# Patient Record
Sex: FEMALE | Race: BLACK OR AFRICAN AMERICAN | NOT HISPANIC OR LATINO | Employment: FULL TIME | URBAN - METROPOLITAN AREA
[De-identification: names, ages, dates, MRNs, and addresses within clinical notes are randomized per-mention and may not be internally consistent; named-entity substitution may affect disease eponyms.]

---

## 2017-04-15 ENCOUNTER — HOSPITAL ENCOUNTER (EMERGENCY)
Facility: HOSPITAL | Age: 57
Discharge: HOME | End: 2017-04-15
Attending: EMERGENCY MEDICINE
Payer: MEDICAID

## 2017-04-15 VITALS
SYSTOLIC BLOOD PRESSURE: 156 MMHG | HEART RATE: 87 BPM | DIASTOLIC BLOOD PRESSURE: 78 MMHG | OXYGEN SATURATION: 99 % | HEIGHT: 64 IN | RESPIRATION RATE: 18 BRPM | TEMPERATURE: 98 F | WEIGHT: 140 LBS | BODY MASS INDEX: 23.9 KG/M2

## 2017-04-15 DIAGNOSIS — G44.209 ACUTE NON INTRACTABLE TENSION-TYPE HEADACHE: Primary | ICD-10-CM

## 2017-04-15 PROCEDURE — 99283 EMERGENCY DEPT VISIT LOW MDM: CPT | Mod: 25

## 2017-04-15 PROCEDURE — 63600175 PHARM REV CODE 636 W HCPCS: Performed by: PHYSICIAN ASSISTANT

## 2017-04-15 PROCEDURE — 25000003 PHARM REV CODE 250: Performed by: PHYSICIAN ASSISTANT

## 2017-04-15 PROCEDURE — 96372 THER/PROPH/DIAG INJ SC/IM: CPT

## 2017-04-15 RX ORDER — LISINOPRIL AND HYDROCHLOROTHIAZIDE 10; 12.5 MG/1; MG/1
1 TABLET ORAL DAILY
COMMUNITY

## 2017-04-15 RX ORDER — METHOCARBAMOL 500 MG/1
500 TABLET, FILM COATED ORAL 2 TIMES DAILY PRN
COMMUNITY
End: 2017-04-15 | Stop reason: ALTCHOICE

## 2017-04-15 RX ORDER — PROPRANOLOL HYDROCHLORIDE 10 MG/1
10 TABLET ORAL 2 TIMES DAILY
COMMUNITY

## 2017-04-15 RX ORDER — SUMATRIPTAN SUCCINATE 25 MG/1
50 TABLET ORAL 3 TIMES DAILY PRN
COMMUNITY

## 2017-04-15 RX ORDER — KETOROLAC TROMETHAMINE 10 MG/1
10 TABLET, FILM COATED ORAL
Status: COMPLETED | OUTPATIENT
Start: 2017-04-15 | End: 2017-04-15

## 2017-04-15 RX ORDER — LEVOTHYROXINE SODIUM 88 UG/1
88 TABLET ORAL DAILY
COMMUNITY

## 2017-04-15 RX ORDER — DIAZEPAM 10 MG/2ML
5 INJECTION INTRAMUSCULAR
Status: COMPLETED | OUTPATIENT
Start: 2017-04-15 | End: 2017-04-15

## 2017-04-15 RX ORDER — CYCLOBENZAPRINE HCL 5 MG
5 TABLET ORAL 3 TIMES DAILY PRN
Qty: 15 TABLET | Refills: 0 | Status: SHIPPED | OUTPATIENT
Start: 2017-04-15 | End: 2017-04-25

## 2017-04-15 RX ORDER — BUTALBITAL, ACETAMINOPHEN AND CAFFEINE 50; 325; 40 MG/1; MG/1; MG/1
1 TABLET ORAL 2 TIMES DAILY PRN
COMMUNITY

## 2017-04-15 RX ADMIN — KETOROLAC TROMETHAMINE 10 MG: 10 TABLET, FILM COATED ORAL at 02:04

## 2017-04-15 RX ADMIN — DIAZEPAM 5 MG: 5 INJECTION, SOLUTION INTRAMUSCULAR; INTRAVENOUS at 02:04

## 2017-04-15 NOTE — DISCHARGE INSTRUCTIONS
Tension Headache    A muscle tension headache is a very common cause of head pain. Its also called a stress headache. When some people are under stress, they tense the muscles of their shoulder, neck, and scalp without knowing it. If this tension lasts long enough, a headache can occur. A tension headache can be quite painful. It can last for hours or even days.  Home care  Follow these tips when caring for yourself at home:  · Dont drive yourself home if you were given pain medicine for your headache. Instead, have someone else drive you home. Try to sleep when you get home. You should feel much better when you wake up.  · Put heat on the back of your neck to help ease neck spasm.  · Drink only clear liquids or eat a light diet until your symptoms get better. This will help you avoid nausea or vomiting.  How to prevent headaches  · Figure out what is causing stress in your life. Learn new ways to handle your stress. Ideas include regular exercise, biofeedback, self-hypnosis, yoga, and meditation. Talk with your healthcare provider to find out more information about managing stress. Many books and digital media are also available on this subject.  · Take time out at the first sign of a tension headache, if possible. Take yourself out of the stressful situation. Find a quiet, comfortable place to sit or lie down and let yourself relax. Heat and deep massage of the tight areas in the neck and shoulders may help ease muscle spasm. You may also get relief from a medicine like ibuprofen or a prescribed muscle relaxant.  Follow-up care  Follow up with your healthcare provider, or as advised. Talk with your provider if you have frequent headaches. He or she can figure out a treatment plan. Ask if you can have medicine to take at home the next time you get a bad headache. This may keep you from having to visit the emergency department in the future. You may need to see a headache specialist (neurologist) if you continue  to have headaches.  When to seek medical advice  Call your healthcare provider right away if any of these occur:  · Your head pain gets worse during sexual intercourse or strenuous activity  · Your head pain doesnt get better within 24 hours  · You arent able to keep liquids down (repeated vomiting)  · Fever of 100.4ºF (38ºC) or higher, or as directed by your healthcare provider  · Stiff neck  · Extreme drowsiness, confusion, or fainting  · Dizziness or dizziness with spinning sensation (vertigo)  · Weakness in an arm or leg or one side of your face  · You have difficulty speaking  · Your vision changes  Date Last Reviewed: 8/1/2016  © 1383-6875 [employer]. [address] [place] [address], [place] [address]. All rights reserved. This information is not intended as a substitute for professional medical care. Always follow your healthcare professional's instructions.

## 2017-04-15 NOTE — ED TRIAGE NOTES
Pt reports posterior headache for the past week, states saw PCP and placed on Robaxin and Fioricet with no relief.

## 2017-04-15 NOTE — ED PROVIDER NOTES
Encounter Date: 4/15/2017       History     Chief Complaint   Patient presents with    Headache     posterior headache that radiates to neck x1 week. Saw pcp for complaint, but denies improvement with prescribed medications. Denies nausea or vision changes     Review of patient's allergies indicates:  No Known Allergies  HPI Comments: Nora Torres 56 y.o. female with PMH of HTN and radiation induced hypothyroidism presented to the ED with C/O headache for the past one week. She describes gradual onset of posterior pain that radiates to bilateral neck. She describes pain exacerbated by certain movements.  She denies any fever, chills, vision changes, sudden onset, weakness, numbness, tingling, nausea, vomiting, or recent sickness. She saw PCP for this pain and was placed on Fioricet, robaxin and Imitrex with no relief. She did not try any medications today for the pain. ROS positive for headache.  Physical exam reveals patient well appearing in no obvious distress wit    The history is provided by the patient.     Past Medical History:   Diagnosis Date    Hypertension     Thyroid disease     hypothyroid     Past Surgical History:   Procedure Laterality Date     SECTION      x2     History reviewed. No pertinent family history.  Social History   Substance Use Topics    Smoking status: Current Every Day Smoker     Types: Cigarettes    Smokeless tobacco: None    Alcohol use No     Review of Systems   Constitutional: Negative for activity change, appetite change, chills and fever.   HENT: Negative for congestion and sore throat.    Eyes: Negative for visual disturbance.   Respiratory: Negative for chest tightness and shortness of breath.    Cardiovascular: Negative for chest pain.   Gastrointestinal: Negative for abdominal pain, nausea and vomiting.   Musculoskeletal: Positive for neck pain. Negative for back pain, gait problem and joint swelling.        Bilateral neck pain   Skin: Negative for rash.    Neurological: Positive for headaches. Negative for dizziness, weakness and light-headedness.   Hematological: Does not bruise/bleed easily.   Psychiatric/Behavioral: Negative for confusion.       Physical Exam   Initial Vitals   BP Pulse Resp Temp SpO2   04/15/17 1318 04/15/17 1318 04/15/17 1318 04/15/17 1318 04/15/17 1318   156/78 87 18 98.3 °F (36.8 °C) 99 %     Physical Exam    Nursing note and vitals reviewed.  Constitutional: Vital signs are normal. She appears well-developed and well-nourished. She is cooperative.  Non-toxic appearance. She does not appear ill. No distress.   HENT:   Head: Normocephalic and atraumatic.   Eyes: Conjunctivae and lids are normal.   Neck: Neck supple. Muscular tenderness present. No spinous process tenderness present. Normal range of motion present.       Cardiovascular: Normal rate and regular rhythm.   Pulmonary/Chest: Breath sounds normal. No respiratory distress. She has no wheezes. She has no rhonchi.   Abdominal: Soft. Normal appearance and bowel sounds are normal. There is no tenderness. There is no rigidity and no guarding.   Neurological: She is alert and oriented to person, place, and time. She has normal strength. No cranial nerve deficit or sensory deficit. Gait normal. GCS eye subscore is 4. GCS verbal subscore is 5. GCS motor subscore is 6.   Skin: Skin is warm, dry and intact. No rash noted.   Psychiatric: She has a normal mood and affect. Her speech is normal and behavior is normal. Thought content normal.         ED Course   Procedures  Labs Reviewed - No data to display     Nora Torres 56 y.o. female with PMH of HTN and radiation induced hypothyroidism presented to the ED with C/O headache for the past one week. She describes gradual onset of posterior pain that radiates to bilateral neck. She describes pain exacerbated by certain movements.  She denies any fever, chills, vision changes, sudden onset, weakness, numbness, tingling, nausea, vomiting, or  recent sickness. She saw PCP for this pain and was placed on Fioricet, robaxin and Imitrex with no relief. She did not try any medications today for the pain. ROS positive for headache.  Physical exam reveals patient well appearing in no obvious distress with smooth steady gait to room. Patient appears to be guarding neck due to some pain however exhibits FROM; TTP of the bilateral capitus and trapezius muscles with spasm noted. No midline tenderness or deformity. PERRL, EOMs intact with no sinus pressure or TM abnormality. Cranial nerves 2-12 with no deficit at this time.    DDX: tension headache, muscle strain, acute intracranial abnormality    ED management:Patient has no neck meningismus, fever, confusion, vision loss, temporal artery tenderness, rash, vomiting, photophobia or thunderclap onset to suggest pseudotumor cerebri, meningitis, tumor, ICH, temporal arteritis, or encephalitis.      Impression/Plan: Patient informed of diagnosis There were no encounter diagnoses.  Discharged with flexeril. Patient will follow up with Primary.  Patient cautioned on when to return to ED.  Pt. Understands and agrees with current treatment plan                         ED Course     Clinical Impression:   The encounter diagnosis was Acute non intractable tension-type headache.          DIANNA Lopez  04/15/17 2902

## 2017-04-15 NOTE — ED AVS SNAPSHOT
[facility] [address]  [place] [address]               Nora Torres   4/15/2017  1:44 PM   ED    Description:  Female : 1960   Department:  [facility]           Your Care was Coordinated By:     Provider Role From To    Dallas Savage Jr., MD Attending Provider 04/15/17 5493 --    DIANNA Lopez Physician Assistant 04/15/17 4190 --      Reason for Visit     Headache           Diagnoses this Visit        Comments    Acute non intractable tension-type headache    -  Primary       ED Disposition     None           To Do List           Follow-up Information     Follow up with Bobbi Booker MD. Go in 1 week.    Specialty:  Family Medicine    Contact information:    [facility] [facility]  [place] [id number] [phone]         These Medications        Disp Refills Start End    cyclobenzaprine (FLEXERIL) 5 MG tablet 15 tablet 0 4/15/2017 2017    Take 1 tablet (5 mg total) by mouth 3 (three) times daily as needed for Muscle spasms. - Oral    Pharmacy: [id number] - [place] - [address] AT [facility] Ph #: [phone]         [facility]     [facility] Nurse Care Line - 24/ Assistance  Unless otherwise directed by your provider, please contact [facility], our nurse care line that is available for 24/ assistance.     Registered nurses in the [facility] provide: appointment scheduling, clinical advisement, health education, and other advisory services.  Call: [phone] (toll free)               Medications           Message regarding Medications     Verify the changes and/or additions to your medication regime listed below are the same as discussed with your clinician today.  If any of these changes or additions are incorrect, please notify your healthcare provider.        START taking these NEW medications        Refills     "cyclobenzaprine (FLEXERIL) 5 MG tablet 0    Sig: Take 1 tablet (5 mg total) by mouth 3 (three) times daily as needed for Muscle spasms.    Class: Print    Route: Oral      These medications were administered today        Dose Freq    diazePAM injection 5 mg 5 mg ED 1 Time    Sig: Inject 1 mL (5 mg total) into the muscle ED 1 Time.    Class: Normal    Route: Intramuscular    Cosign for Ordering: Required by Dallas Savage Jr., MD    ketorolac tablet 10 mg 10 mg ED 1 Time    Sig: Take 1 tablet (10 mg total) by mouth ED 1 Time.    Class: Normal    Route: Oral    Cosign for Ordering: Required by Dallas Savage Jr., MD      STOP taking these medications     methocarbamol (ROBAXIN) 500 MG Tab Take 500 mg by mouth 2 (two) times daily as needed.           Verify that the below list of medications is an accurate representation of the medications you are currently taking.  If none reported, the list may be blank. If incorrect, please contact your healthcare provider. Carry this list with you in case of emergency.           Current Medications     butalbital-acetaminophen-caffeine -40 mg (FIORICET, ESGIC) -40 mg per tablet Take 1 tablet by mouth 2 (two) times daily as needed for Pain.    levothyroxine (SYNTHROID) 88 MCG tablet Take 88 mcg by mouth once daily.    lisinopril-hydrochlorothiazide (PRINZIDE,ZESTORETIC) 10-12.5 mg per tablet Take 1 tablet by mouth once daily.    propranolol (INDERAL) 10 MG tablet Take 10 mg by mouth 2 (two) times daily.    sumatriptan (IMITREX) 25 MG Tab Take 50 mg by mouth 3 (three) times daily as needed.    cyclobenzaprine (FLEXERIL) 5 MG tablet Take 1 tablet (5 mg total) by mouth 3 (three) times daily as needed for Muscle spasms.           Clinical Reference Information           Your Vitals Were     BP Pulse Temp Resp Height Weight    156/78 87 98.3 °F (36.8 °C) (Oral) 18 5' 4" (1.626 m) 63.5 kg (140 lb)    SpO2 BMI             99% 24.03 kg/m2         Allergies as of 4/15/2017     " No Known Allergies      Immunizations Administered on Date of Encounter - 4/15/2017     None      ED Micro, Lab, POCT     None      ED Imaging Orders     None        Discharge Instructions         Tension Headache    A muscle tension headache is a very common cause of head pain. Its also called a stress headache. When some people are under stress, they tense the muscles of their shoulder, neck, and scalp without knowing it. If this tension lasts long enough, a headache can occur. A tension headache can be quite painful. It can last for hours or even days.  Home care  Follow these tips when caring for yourself at home:  · Dont drive yourself home if you were given pain medicine for your headache. Instead, have someone else drive you home. Try to sleep when you get home. You should feel much better when you wake up.  · Put heat on the back of your neck to help ease neck spasm.  · Drink only clear liquids or eat a light diet until your symptoms get better. This will help you avoid nausea or vomiting.  How to prevent headaches  · Figure out what is causing stress in your life. Learn new ways to handle your stress. Ideas include regular exercise, biofeedback, self-hypnosis, yoga, and meditation. Talk with your healthcare provider to find out more information about managing stress. Many books and digital media are also available on this subject.  · Take time out at the first sign of a tension headache, if possible. Take yourself out of the stressful situation. Find a quiet, comfortable place to sit or lie down and let yourself relax. Heat and deep massage of the tight areas in the neck and shoulders may help ease muscle spasm. You may also get relief from a medicine like ibuprofen or a prescribed muscle relaxant.  Follow-up care  Follow up with your healthcare provider, or as advised. Talk with your provider if you have frequent headaches. He or she can figure out a treatment plan. Ask if you can have medicine to take  at home the next time you get a bad headache. This may keep you from having to visit the emergency department in the future. You may need to see a headache specialist (neurologist) if you continue to have headaches.  When to seek medical advice  Call your healthcare provider right away if any of these occur:  · Your head pain gets worse during sexual intercourse or strenuous activity  · Your head pain doesnt get better within 24 hours  · You arent able to keep liquids down (repeated vomiting)  · Fever of 100.4ºF (38ºC) or higher, or as directed by your healthcare provider  · Stiff neck  · Extreme drowsiness, confusion, or fainting  · Dizziness or dizziness with spinning sensation (vertigo)  · Weakness in an arm or leg or one side of your face  · You have difficulty speaking  · Your vision changes  Date Last Reviewed: 8/1/2016  © 5580-2209 [employer]. [address] [place] [address]. All rights reserved. This information is not intended as a substitute for professional medical care. Always follow your healthcare professional's instructions.          [facility] Sign-Up     Activating your [facility] account is as easy as 1-2-3!     1) Visit [email or web address], select Sign Up Now, enter this activation code and your date of birth, then select Next.  [email or web address]  Expires: 5/30/2017  2:45 PM      2) Create a username and password to use when you visit [facility] in the future and select a security question in case you lose your password and select Next.    3) Enter your e-mail address and click Sign Up!    Additional Information  If you have questions, please e-mail [email or web address] or call [phone] to talk to our [facility] staff. Remember, [facility] is NOT to be used for urgent needs. For medical emergencies, dial 911.         Smoking Cessation     If you would like to quit smoking:   You may be eligible for free services if you are a [place] resident and started smoking cigarettes  before September 1, 1988.  Call the Smoking Cessation Trust (SCT) toll free at (325) 593-2653 or (930) 082-5374.   Call 1-800-QUIT-NOW if you do not meet the above criteria.   Contact us via email: [email or web address]   View our website for more information: [email or web address]         [facility] complies with applicable Federal civil rights laws and does not discriminate on the basis of race, color, national origin, age, disability, or sex.        Language Assistance Services     ATTENTION: Language assistance services are available, free of charge. Please call [phone].      ATENCIÓN: Si habla español, tiene a murrell disposición servicios gratuitos de asistencia lingüística. Llame al [phone].     CHÚ Ý: N?u b?n nói Ti?ng Vi?t, có các d?ch v? h? tr? ngôn ng? mi?n phí dành cho b?n. G?i s? [phone].

## 2017-10-10 ENCOUNTER — CLINICAL SUPPORT (OUTPATIENT)
Dept: URGENT CARE | Facility: CLINIC | Age: 57
End: 2017-10-10

## 2017-10-10 DIAGNOSIS — Z23 ENCOUNTER FOR IMMUNIZATION: Primary | ICD-10-CM

## 2017-10-10 PROCEDURE — 86580 TB INTRADERMAL TEST: CPT | Mod: S$GLB,,,

## 2024-09-16 ENCOUNTER — HOSPITAL ENCOUNTER (EMERGENCY)
Facility: HOSPITAL | Age: 64
Discharge: HOME | End: 2024-09-16
Attending: FAMILY MEDICINE
Payer: MEDICAID

## 2024-09-16 VITALS
SYSTOLIC BLOOD PRESSURE: 155 MMHG | OXYGEN SATURATION: 100 % | HEART RATE: 79 BPM | BODY MASS INDEX: 23.9 KG/M2 | HEIGHT: 64 IN | DIASTOLIC BLOOD PRESSURE: 81 MMHG | WEIGHT: 140 LBS | TEMPERATURE: 98 F | RESPIRATION RATE: 20 BRPM

## 2024-09-16 DIAGNOSIS — S39.012A LUMBAR STRAIN, INITIAL ENCOUNTER: Primary | ICD-10-CM

## 2024-09-16 DIAGNOSIS — R61 DIAPHORESIS: ICD-10-CM

## 2024-09-16 DIAGNOSIS — M47.816 SPONDYLOSIS OF LUMBAR SPINE: ICD-10-CM

## 2024-09-16 LAB
ALBUMIN SERPL BCP-MCNC: 4 G/DL (ref 3.5–5.2)
ALP SERPL-CCNC: 106 U/L (ref 38–126)
ALT SERPL W/O P-5'-P-CCNC: 16 U/L (ref 10–44)
AMPHET+METHAMPHET UR QL: NEGATIVE
ANION GAP SERPL CALC-SCNC: 7 MMOL/L (ref 8–16)
AST SERPL-CCNC: 34 U/L (ref 15–46)
BACTERIA #/AREA URNS AUTO: ABNORMAL /HPF
BARBITURATES UR QL SCN>200 NG/ML: NEGATIVE
BASOPHILS # BLD AUTO: 0.06 K/UL (ref 0–0.2)
BASOPHILS NFR BLD: 0.6 % (ref 0–1.9)
BENZODIAZ UR QL SCN>200 NG/ML: NEGATIVE
BILIRUB SERPL-MCNC: 0.4 MG/DL (ref 0.1–1)
BILIRUB UR QL STRIP: NEGATIVE
BZE UR QL SCN: NEGATIVE
CALCIUM SERPL-MCNC: 9.2 MG/DL (ref 8.7–10.5)
CANNABINOIDS UR QL SCN: NEGATIVE
CHLORIDE SERPL-SCNC: 105 MMOL/L (ref 95–110)
CLARITY UR REFRACT.AUTO: CLEAR
CO2 SERPL-SCNC: 26 MMOL/L (ref 23–29)
COLOR UR AUTO: YELLOW
CREAT SERPL-MCNC: 0.88 MG/DL (ref 0.5–1.4)
CREAT UR-MCNC: 81.2 MG/DL (ref 15–325)
DIFFERENTIAL METHOD BLD: ABNORMAL
EOSINOPHIL # BLD AUTO: 0 K/UL (ref 0–0.5)
EOSINOPHIL NFR BLD: 0.4 % (ref 0–8)
ERYTHROCYTE [DISTWIDTH] IN BLOOD BY AUTOMATED COUNT: 15.1 % (ref 11.5–14.5)
EST. GFR  (NO RACE VARIABLE): >60 ML/MIN/1.73 M^2
GLUCOSE SERPL-MCNC: 148 MG/DL (ref 70–110)
GLUCOSE UR QL STRIP: NEGATIVE
HCT VFR BLD AUTO: 39.6 % (ref 37–48.5)
HGB BLD-MCNC: 13.4 G/DL (ref 12–16)
HGB UR QL STRIP: ABNORMAL
IMM GRANULOCYTES # BLD AUTO: 0.06 K/UL (ref 0–0.04)
IMM GRANULOCYTES NFR BLD AUTO: 0.6 % (ref 0–0.5)
KETONES UR QL STRIP: NEGATIVE
LEUKOCYTE ESTERASE UR QL STRIP: NEGATIVE
LYMPHOCYTES # BLD AUTO: 1.7 K/UL (ref 1–4.8)
LYMPHOCYTES NFR BLD: 16.6 % (ref 18–48)
MCH RBC QN AUTO: 29.3 PG (ref 27–31)
MCHC RBC AUTO-ENTMCNC: 33.8 G/DL (ref 32–36)
MCV RBC AUTO: 87 FL (ref 82–98)
METHADONE UR QL SCN>300 NG/ML: NEGATIVE
MICROSCOPIC COMMENT: ABNORMAL
MONOCYTES # BLD AUTO: 0.6 K/UL (ref 0.3–1)
MONOCYTES NFR BLD: 5.5 % (ref 4–15)
NEUTROPHILS # BLD AUTO: 8 K/UL (ref 1.8–7.7)
NEUTROPHILS NFR BLD: 76.3 % (ref 38–73)
NITRITE UR QL STRIP: POSITIVE
NRBC BLD-RTO: 0 /100 WBC
NT-PROBNP SERPL-MCNC: <20 PG/ML (ref 5–900)
OPIATES UR QL SCN: ABNORMAL
PCP UR QL SCN>25 NG/ML: NEGATIVE
PH UR STRIP: 6 [PH] (ref 5–8)
PLATELET # BLD AUTO: 327 K/UL (ref 150–450)
PMV BLD AUTO: 9.2 FL (ref 9.2–12.9)
POCT GLUCOSE: 138 MG/DL (ref 70–110)
POTASSIUM SERPL-SCNC: 3.5 MMOL/L (ref 3.5–5.1)
PROT SERPL-MCNC: 7.4 G/DL (ref 6–8.4)
PROT UR QL STRIP: NEGATIVE
RBC # BLD AUTO: 4.57 M/UL (ref 4–5.4)
RBC #/AREA URNS AUTO: 2 /HPF (ref 0–4)
SODIUM SERPL-SCNC: 138 MMOL/L (ref 136–145)
SP GR UR STRIP: 1.02 (ref 1–1.03)
TOXICOLOGY INFORMATION: ABNORMAL
TROPONIN I SERPL-MCNC: <0.012 NG/ML (ref 0.01–0.03)
URN SPEC COLLECT METH UR: ABNORMAL
UROBILINOGEN UR STRIP-ACNC: NEGATIVE EU/DL
UUN UR-MCNC: 14 MG/DL (ref 7–17)
WBC # BLD AUTO: 10.45 K/UL (ref 3.9–12.7)
WBC #/AREA URNS AUTO: 2 /HPF (ref 0–5)

## 2024-09-16 PROCEDURE — 83880 ASSAY OF NATRIURETIC PEPTIDE: CPT | Mod: ER | Performed by: FAMILY MEDICINE

## 2024-09-16 PROCEDURE — 63600175 PHARM REV CODE 636 W HCPCS: Mod: ER | Performed by: FAMILY MEDICINE

## 2024-09-16 PROCEDURE — 93005 ELECTROCARDIOGRAM TRACING: CPT | Mod: ER

## 2024-09-16 PROCEDURE — 99900035 HC TECH TIME PER 15 MIN (STAT): Mod: ER

## 2024-09-16 PROCEDURE — 80053 COMPREHEN METABOLIC PANEL: CPT | Mod: ER | Performed by: FAMILY MEDICINE

## 2024-09-16 PROCEDURE — 84484 ASSAY OF TROPONIN QUANT: CPT | Mod: ER | Performed by: FAMILY MEDICINE

## 2024-09-16 PROCEDURE — 82962 GLUCOSE BLOOD TEST: CPT | Mod: ER

## 2024-09-16 PROCEDURE — 93010 ELECTROCARDIOGRAM REPORT: CPT | Mod: ,,, | Performed by: STUDENT IN AN ORGANIZED HEALTH CARE EDUCATION/TRAINING PROGRAM

## 2024-09-16 PROCEDURE — 85025 COMPLETE CBC W/AUTO DIFF WBC: CPT | Mod: ER | Performed by: FAMILY MEDICINE

## 2024-09-16 PROCEDURE — 81000 URINALYSIS NONAUTO W/SCOPE: CPT | Mod: ER,59 | Performed by: FAMILY MEDICINE

## 2024-09-16 PROCEDURE — 80307 DRUG TEST PRSMV CHEM ANLYZR: CPT | Mod: ER | Performed by: FAMILY MEDICINE

## 2024-09-16 PROCEDURE — 25000003 PHARM REV CODE 250: Mod: ER | Performed by: FAMILY MEDICINE

## 2024-09-16 PROCEDURE — 99285 EMERGENCY DEPT VISIT HI MDM: CPT | Mod: 25,ER

## 2024-09-16 PROCEDURE — 96372 THER/PROPH/DIAG INJ SC/IM: CPT | Performed by: FAMILY MEDICINE

## 2024-09-16 RX ORDER — DEXAMETHASONE SODIUM PHOSPHATE 4 MG/ML
4 INJECTION, SOLUTION INTRA-ARTICULAR; INTRALESIONAL; INTRAMUSCULAR; INTRAVENOUS; SOFT TISSUE
Status: COMPLETED | OUTPATIENT
Start: 2024-09-16 | End: 2024-09-16

## 2024-09-16 RX ORDER — NAPROXEN 500 MG/1
500 TABLET ORAL 2 TIMES DAILY
Qty: 20 TABLET | Refills: 0 | Status: SHIPPED | OUTPATIENT
Start: 2024-09-16

## 2024-09-16 RX ORDER — ONDANSETRON 4 MG/1
4 TABLET, ORALLY DISINTEGRATING ORAL
Status: COMPLETED | OUTPATIENT
Start: 2024-09-16 | End: 2024-09-16

## 2024-09-16 RX ORDER — KETOROLAC TROMETHAMINE 30 MG/ML
60 INJECTION, SOLUTION INTRAMUSCULAR; INTRAVENOUS
Status: COMPLETED | OUTPATIENT
Start: 2024-09-16 | End: 2024-09-16

## 2024-09-16 RX ORDER — AMLODIPINE BESYLATE 10 MG/1
10 TABLET ORAL DAILY
COMMUNITY

## 2024-09-16 RX ORDER — METHOCARBAMOL 500 MG/1
500 TABLET, FILM COATED ORAL 3 TIMES DAILY
Qty: 30 TABLET | Refills: 0 | Status: SHIPPED | OUTPATIENT
Start: 2024-09-16 | End: 2024-09-26

## 2024-09-16 RX ADMIN — KETOROLAC TROMETHAMINE 60 MG: 30 INJECTION, SOLUTION INTRAMUSCULAR at 01:09

## 2024-09-16 RX ADMIN — DEXAMETHASONE SODIUM PHOSPHATE 4 MG: 4 INJECTION, SOLUTION INTRA-ARTICULAR; INTRALESIONAL; INTRAMUSCULAR; INTRAVENOUS; SOFT TISSUE at 01:09

## 2024-09-16 RX ADMIN — ONDANSETRON 4 MG: 4 TABLET, ORALLY DISINTEGRATING ORAL at 02:09

## 2024-09-16 NOTE — ED PROVIDER NOTES
"Encounter Date: 2024       History     Chief Complaint   Patient presents with    Back Pain     Lower back x 2 months. Pt reports she "tried to  some water in [employer]"     63-year-old female complains of low back pain for last 2 months after lifting case a water bottle.  Bilateral lumbar pain sometimes radiates to right lower.  No tingling numbness and weakness.  Patient ambulatory.  No saddle anesthesia.  No bowel or bladder disturbances.  Patient seen by her PCP and was prescribed pain medication.  She is now out of her pain medication.    The history is provided by the patient.     Review of patient's allergies indicates:  No Known Allergies  Past Medical History:   Diagnosis Date    Hypertension     Thyroid disease     hypothyroid     Past Surgical History:   Procedure Laterality Date     SECTION      x2     No family history on file.  Social History     Tobacco Use    Smoking status: Every Day     Types: Cigarettes   Substance Use Topics    Alcohol use: No     Review of Systems   Musculoskeletal:  Positive for back pain. Negative for gait problem.   All other systems reviewed and are negative.      Physical Exam     Initial Vitals [24 1319]   BP Pulse Resp Temp SpO2   (!) 159/94 94 20 98.1 °F (36.7 °C) 96 %      MAP       --         Physical Exam    Nursing note and vitals reviewed.  Constitutional: Vital signs are normal. She appears well-developed and well-nourished. She is active. No distress.   HENT:   Head: Normocephalic.   Nose: Nose normal.   Mouth/Throat: Oropharynx is clear and moist and mucous membranes are normal.   Eyes: Conjunctivae, EOM and lids are normal.   Neck: Neck supple.   Normal range of motion.  Cardiovascular:  Normal rate, regular rhythm, S1 normal, S2 normal and normal heart sounds.           Pulmonary/Chest: Breath sounds normal. No respiratory distress. She has no wheezes. She has no rhonchi. She has no rales.   Abdominal: Abdomen is soft. Bowel sounds are " normal. She exhibits no distension. There is no abdominal tenderness. There is no rebound.   Musculoskeletal:         General: Normal range of motion.      Right upper arm: Normal.      Left upper arm: Normal.      Cervical back: Normal range of motion and neck supple.        Back:       Right lower leg: Normal.      Left lower leg: Normal.     Neurological: She is alert and oriented to person, place, and time. She has normal strength. She displays normal reflexes. No cranial nerve deficit or sensory deficit. GCS score is 15. GCS eye subscore is 4. GCS verbal subscore is 5. GCS motor subscore is 6.   Skin: Skin is warm. Capillary refill takes less than 2 seconds.   Psychiatric: She has a normal mood and affect. Her speech is normal and behavior is normal. Thought content normal. Cognition and memory are normal.         ED Course   Procedures  Labs Reviewed   URINALYSIS, REFLEX TO URINE CULTURE - Abnormal       Result Value    Specimen UA Urine, Clean Catch      Color, UA Yellow      Appearance, UA Clear      pH, UA 6.0      Specific Gravity, UA 1.020      Protein, UA Negative      Glucose, UA Negative      Ketones, UA Negative      Bilirubin (UA) Negative      Occult Blood UA Trace (*)     Nitrite, UA Positive (*)     Urobilinogen, UA Negative      Leukocytes, UA Negative      Narrative:     Preferred Collection Type->Urine, Clean Catch  Specimen Source->Urine   URINALYSIS MICROSCOPIC - Abnormal    RBC, UA 2      WBC, UA 2      Bacteria Many (*)     Microscopic Comment SEE COMMENT      Narrative:     Preferred Collection Type->Urine, Clean Catch  Specimen Source->Urine   CBC W/ AUTO DIFFERENTIAL - Abnormal    WBC 10.45      RBC 4.57      Hemoglobin 13.4      Hematocrit 39.6      MCV 87      MCH 29.3      MCHC 33.8      RDW 15.1 (*)     Platelets 327      MPV 9.2      Immature Granulocytes 0.6 (*)     Gran # (ANC) 8.0 (*)     Immature Grans (Abs) 0.06 (*)     Lymph # 1.7      Mono # 0.6      Eos # 0.0      Baso #  0.06      nRBC 0      Gran % 76.3 (*)     Lymph % 16.6 (*)     Mono % 5.5      Eosinophil % 0.4      Basophil % 0.6      Differential Method Automated     COMPREHENSIVE METABOLIC PANEL - Abnormal    Sodium 138      Potassium 3.5      Chloride 105      CO2 26      Glucose 148 (*)     BUN 14      Creatinine 0.88      Calcium 9.2      Total Protein 7.4      Albumin 4.0      Total Bilirubin 0.4      Alkaline Phosphatase 106      AST 34      ALT 16      Anion Gap 7 (*)     eGFR >60.0     DRUG SCREEN PANEL, URINE EMERGENCY - Abnormal    Benzodiazepines Negative      Methadone metabolites Negative      Cocaine (Metab.) Negative      Opiate Scrn, Ur Presumptive Positive (*)     Barbiturate Screen, Ur Negative      Amphetamine Screen, Ur Negative      THC Negative      Phencyclidine Negative      Creatinine, Urine 81.2      Toxicology Information SEE COMMENT      Narrative:     Preferred Collection Type->Urine, Clean Catch  Specimen Source->Urine   POCT GLUCOSE - Abnormal    POCT Glucose 138 (*)    NT-PRO NATRIURETIC PEPTIDE    NT-proBNP <20     TROPONIN I    Troponin I <0.012     DRUG SCREEN PANEL, URINE EMERGENCY     EKG Readings: (Independently Interpreted)   Initial Reading: No STEMI. Rhythm: Normal Sinus Rhythm. Heart Rate: 70. Ectopy: No Ectopy. Conduction: Normal. ST Segments: Normal ST Segments. T Waves: Normal. Clinical Impression: Normal Sinus Rhythm   Qtc 477     ECG Results              EKG 12-lead (Final result)        Collection Time Result Time QRS Duration OHS QTC Calculation    09/16/24 15:36:42 09/17/24 16:49:14 104 477                     Final result by Interface, Lab In [facility] (09/17/24 16:49:20)                   Narrative:    Test Reason : R61,    Vent. Rate : 070 BPM     Atrial Rate : 070 BPM     P-R Int : 146 ms          QRS Dur : 104 ms      QT Int : 442 ms       P-R-T Axes : 072 077 052 degrees     QTc Int : 477 ms    Normal sinus rhythm  Normal ECG  When compared with ECG of 20-MAR-2007  11:15,  QT has lengthened  Confirmed by Margi Maria MD, Nasir ([id number]) on 9/17/2024 4:49:12 PM    Referred By: [email or web address]   SELF           Confirmed By:Nasir Maria,                                  Imaging Results              X-Ray Lumbar Spine Ap And Lateral (Final result)  Result time 09/16/24 14:11:52      Final result by David Robertson MD (09/16/24 14:11:52)                   Impression:      No acute abnormality.      Electronically signed by: David Robertson MD  Date:    09/16/2024  Time:    14:11               Narrative:    EXAMINATION:  XR LUMBAR SPINE AP AND LATERAL    CLINICAL HISTORY:  ,low back pain;    TECHNIQUE:  Standard lumbar spine x-ray.    COMPARISON:  None    FINDINGS:  Minor rotatory levoscoliosis is present    Mild multilevel spurring or spondylosis is noted.    Mild-to-moderate multilevel facet arthrosis is noted.    No lytic or sclerotic bone lesions.                                       Medications   ketorolac injection 60 mg (60 mg Intramuscular Given 9/16/24 1348)   dexAMETHasone injection 4 mg (4 mg Intramuscular Given 9/16/24 1348)   ondansetron disintegrating tablet 4 mg (4 mg Oral Given 9/16/24 1432)     Medical Decision Making  Differential diagnosis include not limited to lumbar strain, spondylosis/arthritis, lumbar radiculopathy, disc herniation, spinal stenosis  Negative neuro deficits.    Will get x-rays of lumbar spine and urinalysis.  X-ray mild DJD.  Patient is given Decadron and Toradol in injection in ED.  Prescription for naproxen and Robaxin.   PRIOR TO DISCHARGE PATIENT BECAME NAUSEATED AND DIZZY.  DISCHARGE HELD.  EKG AND LABS ORDERED.  EKG , labs normal, UDS positive for OPiate-   Pt observed in ED, on cardiac monitor - normal , Vitals normal , She made to walk and Re-examine with no Nuero deficits.   Unknown if opiate caused symptoms -   Pt discharge home in stable condition     Follow up PCP/ED with any worsening symptoms.    Amount and/or Complexity  of Data Reviewed  Labs: ordered. Decision-making details documented in ED Course.     Details: Normal urinalysis  Radiology: ordered and independent interpretation performed.     Details: Mild DJD of lumbar spine    Risk  Prescription drug management.                                      Clinical Impression:  Final diagnoses:  [S39.012A] Lumbar strain, initial encounter (Primary)  [M47.816] Spondylosis of lumbar spine  [R61] Diaphoresis          ED Disposition Condition    Discharge Stable            ED Prescriptions       Medication Sig Dispense Start Date End Date Auth. Provider    naproxen (NAPROSYN) 500 MG tablet Take 1 tablet (500 mg total) by mouth 2 (two) times daily. 20 tablet 9/16/2024 -- Yosvany Vazquez MD    methocarbamoL (ROBAXIN) 500 MG Tab Take 1 tablet (500 mg total) by mouth 3 (three) times daily. for 10 days 30 tablet 9/16/2024 9/26/2024 Yosvany Vazquez MD          Follow-up Information       Follow up With Specialties Details Why Contact Info    Bobbi Booker MD ([facility]   [address] [facility] [id number] [phone]                 Yosvany Vazquez MD  09/16/24 142       Yosvany Vazquez MD  09/18/24 0879       Yosvany Vazquez MD  09/18/24 0889

## 2024-09-17 LAB
OHS QRS DURATION: 104 MS
OHS QTC CALCULATION: 477 MS

## 2025-01-01 ENCOUNTER — TELEPHONE (OUTPATIENT)
Dept: INTERVENTIONAL RADIOLOGY/VASCULAR | Facility: HOSPITAL | Age: 65
End: 2025-01-01
Payer: MEDICAID

## 2025-01-01 ENCOUNTER — TELEPHONE (OUTPATIENT)
Dept: HEMATOLOGY/ONCOLOGY | Facility: CLINIC | Age: 65
End: 2025-01-01
Payer: MEDICAID

## 2025-01-01 ENCOUNTER — HOSPITAL ENCOUNTER (OUTPATIENT)
Dept: RADIOLOGY | Facility: HOSPITAL | Age: 65
Discharge: HOME | End: 2025-02-25
Attending: FAMILY MEDICINE
Payer: MEDICAID

## 2025-01-01 ENCOUNTER — HOSPITAL ENCOUNTER (INPATIENT)
Facility: HOSPITAL | Age: 65
LOS: 7 days | Discharge: NURSING, REHAB OR LONG-TERM CARE | DRG: 843 | End: 2025-02-12
Attending: EMERGENCY MEDICINE | Admitting: INTERNAL MEDICINE
Payer: MEDICAID

## 2025-01-01 ENCOUNTER — CLINICAL SUPPORT (OUTPATIENT)
Dept: SMOKING CESSATION | Facility: CLINIC | Age: 65
End: 2025-01-01
Payer: COMMERCIAL

## 2025-01-01 ENCOUNTER — CLINICAL SUPPORT (OUTPATIENT)
Dept: SMOKING CESSATION | Facility: CLINIC | Age: 65
End: 2025-01-01

## 2025-01-01 ENCOUNTER — HOSPITAL ENCOUNTER (INPATIENT)
Facility: HOSPITAL | Age: 65
LOS: 1 days | DRG: 871 | End: 2025-02-27
Attending: EMERGENCY MEDICINE | Admitting: HOSPITALIST
Payer: MEDICAID

## 2025-01-01 VITALS
WEIGHT: 99.19 LBS | RESPIRATION RATE: 17 BRPM | HEIGHT: 64 IN | OXYGEN SATURATION: 85 % | BODY MASS INDEX: 16.93 KG/M2 | DIASTOLIC BLOOD PRESSURE: 36 MMHG | HEART RATE: 137 BPM | SYSTOLIC BLOOD PRESSURE: 91 MMHG | TEMPERATURE: 98 F

## 2025-01-01 VITALS
HEART RATE: 88 BPM | RESPIRATION RATE: 18 BRPM | DIASTOLIC BLOOD PRESSURE: 91 MMHG | WEIGHT: 113.75 LBS | SYSTOLIC BLOOD PRESSURE: 156 MMHG | OXYGEN SATURATION: 95 % | TEMPERATURE: 98 F | BODY MASS INDEX: 19.42 KG/M2 | HEIGHT: 64 IN

## 2025-01-01 VITALS
SYSTOLIC BLOOD PRESSURE: 126 MMHG | RESPIRATION RATE: 17 BRPM | WEIGHT: 113 LBS | TEMPERATURE: 98 F | BODY MASS INDEX: 19.29 KG/M2 | HEIGHT: 64 IN | OXYGEN SATURATION: 96 % | DIASTOLIC BLOOD PRESSURE: 74 MMHG | HEART RATE: 100 BPM

## 2025-01-01 DIAGNOSIS — J69.0 ASPIRATION PNEUMONIA OF RIGHT LOWER LOBE, UNSPECIFIED ASPIRATION PNEUMONIA TYPE: ICD-10-CM

## 2025-01-01 DIAGNOSIS — E83.52 HYPERCALCEMIA OF MALIGNANCY: ICD-10-CM

## 2025-01-01 DIAGNOSIS — C50.919 PRIMARY MALIGNANT NEOPLASM OF BREAST, UNSPECIFIED LATERALITY: ICD-10-CM

## 2025-01-01 DIAGNOSIS — R07.9 CHEST PAIN: ICD-10-CM

## 2025-01-01 DIAGNOSIS — C80.1 METASTASIS TO BONE OF UNKNOWN PRIMARY: ICD-10-CM

## 2025-01-01 DIAGNOSIS — M89.9 BONE LESION: ICD-10-CM

## 2025-01-01 DIAGNOSIS — R07.9 CHEST PAIN, UNSPECIFIED TYPE: ICD-10-CM

## 2025-01-01 DIAGNOSIS — M54.41 ACUTE MIDLINE LOW BACK PAIN WITH RIGHT-SIDED SCIATICA: Primary | ICD-10-CM

## 2025-01-01 DIAGNOSIS — C79.9 METASTATIC CANCER: ICD-10-CM

## 2025-01-01 DIAGNOSIS — J96.01 ACUTE HYPOXEMIC RESPIRATORY FAILURE: ICD-10-CM

## 2025-01-01 DIAGNOSIS — Z13.6 SCREENING FOR CARDIOVASCULAR CONDITION: ICD-10-CM

## 2025-01-01 DIAGNOSIS — A41.9 SEPTIC SHOCK: Primary | ICD-10-CM

## 2025-01-01 DIAGNOSIS — C79.51 METASTASIS TO BONE OF UNKNOWN PRIMARY: ICD-10-CM

## 2025-01-01 DIAGNOSIS — R65.21 SEPTIC SHOCK: Primary | ICD-10-CM

## 2025-01-01 DIAGNOSIS — J90 PLEURAL EFFUSION, RIGHT: ICD-10-CM

## 2025-01-01 DIAGNOSIS — S32.058A OTHER CLOSED FRACTURE OF FIFTH LUMBAR VERTEBRA, INITIAL ENCOUNTER: ICD-10-CM

## 2025-01-01 DIAGNOSIS — F17.210 CIGARETTE SMOKER: Primary | ICD-10-CM

## 2025-01-01 DIAGNOSIS — S32.048A OTHER CLOSED FRACTURE OF FOURTH LUMBAR VERTEBRA, INITIAL ENCOUNTER: ICD-10-CM

## 2025-01-01 DIAGNOSIS — C79.9 METASTATIC MALIGNANT NEOPLASM, UNSPECIFIED SITE: Primary | ICD-10-CM

## 2025-01-01 DIAGNOSIS — C79.9 METASTATIC MALIGNANT NEOPLASM, UNSPECIFIED SITE: ICD-10-CM

## 2025-01-01 LAB
ALBUMIN SERPL BCP-MCNC: 2.5 G/DL (ref 3.5–5.2)
ALBUMIN SERPL BCP-MCNC: 2.9 G/DL (ref 3.5–5.2)
ALBUMIN SERPL BCP-MCNC: 3 G/DL (ref 3.5–5.2)
ALBUMIN SERPL BCP-MCNC: 3.1 G/DL (ref 3.5–5.2)
ALBUMIN SERPL BCP-MCNC: 3.2 G/DL (ref 3.5–5.2)
ALBUMIN SERPL BCP-MCNC: 3.3 G/DL (ref 3.5–5.2)
ALBUMIN SERPL ELPH-MCNC: 3.17 G/DL (ref 3.35–5.55)
ALLENS TEST: YES
ALP SERPL-CCNC: 121 U/L (ref 40–150)
ALP SERPL-CCNC: 130 U/L (ref 40–150)
ALP SERPL-CCNC: 130 U/L (ref 40–150)
ALP SERPL-CCNC: 144 U/L (ref 40–150)
ALP SERPL-CCNC: 147 U/L (ref 40–150)
ALP SERPL-CCNC: 225 U/L (ref 40–150)
ALPHA1 GLOB SERPL ELPH-MCNC: 0.4 G/DL (ref 0.17–0.41)
ALPHA2 GLOB SERPL ELPH-MCNC: 0.91 G/DL (ref 0.43–0.99)
ALT SERPL W/O P-5'-P-CCNC: 48 U/L (ref 10–44)
ALT SERPL W/O P-5'-P-CCNC: 6 U/L (ref 10–44)
ALT SERPL W/O P-5'-P-CCNC: 6 U/L (ref 10–44)
ALT SERPL W/O P-5'-P-CCNC: 8 U/L (ref 10–44)
ALT SERPL W/O P-5'-P-CCNC: 8 U/L (ref 10–44)
ALT SERPL W/O P-5'-P-CCNC: 9 U/L (ref 10–44)
ANION GAP SERPL CALC-SCNC: 13 MMOL/L (ref 8–16)
ANION GAP SERPL CALC-SCNC: 14 MMOL/L (ref 8–16)
ANION GAP SERPL CALC-SCNC: 18 MMOL/L (ref 8–16)
ANION GAP SERPL CALC-SCNC: 9 MMOL/L (ref 8–16)
ANION GAP SERPL CALC-SCNC: 9 MMOL/L (ref 8–16)
AST SERPL-CCNC: 40 U/L (ref 10–40)
AST SERPL-CCNC: 40 U/L (ref 10–40)
AST SERPL-CCNC: 51 U/L (ref 10–40)
AST SERPL-CCNC: 52 U/L (ref 10–40)
AST SERPL-CCNC: 55 U/L (ref 10–40)
AST SERPL-CCNC: 71 U/L (ref 10–40)
B-GLOBULIN SERPL ELPH-MCNC: 0.95 G/DL (ref 0.5–1.1)
B2 MICROGLOB SERPL-MCNC: 2.2 UG/ML (ref 0–2.5)
BACTERIA #/AREA URNS HPF: ABNORMAL /HPF
BACTERIA #/AREA URNS HPF: ABNORMAL /HPF
BACTERIA BLD CULT: NORMAL
BACTERIA BLD CULT: NORMAL
BASOPHILS # BLD AUTO: 0.03 K/UL (ref 0–0.2)
BASOPHILS # BLD AUTO: 0.04 K/UL (ref 0–0.2)
BASOPHILS # BLD AUTO: ABNORMAL K/UL (ref 0–0.2)
BASOPHILS NFR BLD: 0 % (ref 0–1.9)
BASOPHILS NFR BLD: 0.2 % (ref 0–1.9)
BASOPHILS NFR BLD: 0.4 % (ref 0–1.9)
BILIRUB SERPL-MCNC: 0.3 MG/DL (ref 0.1–1)
BILIRUB SERPL-MCNC: 0.3 MG/DL (ref 0.1–1)
BILIRUB SERPL-MCNC: 0.6 MG/DL (ref 0.1–1)
BILIRUB SERPL-MCNC: 1.8 MG/DL (ref 0.1–1)
BILIRUB UR QL STRIP: NEGATIVE
BILIRUB UR QL STRIP: NEGATIVE
BNP SERPL-MCNC: 136 PG/ML (ref 0–99)
BUN SERPL-MCNC: 10 MG/DL (ref 8–23)
BUN SERPL-MCNC: 11 MG/DL (ref 8–23)
BUN SERPL-MCNC: 14 MG/DL (ref 8–23)
BUN SERPL-MCNC: 16 MG/DL (ref 8–23)
BUN SERPL-MCNC: 50 MG/DL (ref 8–23)
BUN SERPL-MCNC: 61 MG/DL (ref 8–23)
CA-I BLDV-SCNC: 1.78 MMOL/L (ref 1.06–1.42)
CALCIUM SERPL-MCNC: 10.4 MG/DL (ref 8.7–10.5)
CALCIUM SERPL-MCNC: 10.9 MG/DL (ref 8.7–10.5)
CALCIUM SERPL-MCNC: 10.9 MG/DL (ref 8.7–10.5)
CALCIUM SERPL-MCNC: 11.3 MG/DL (ref 8.7–10.5)
CALCIUM SERPL-MCNC: 11.7 MG/DL (ref 8.7–10.5)
CALCIUM SERPL-MCNC: 12.9 MG/DL (ref 8.7–10.5)
CALCIUM SERPL-MCNC: 13.2 MG/DL (ref 8.7–10.5)
CALCIUM SERPL-MCNC: 15.5 MG/DL (ref 8.7–10.5)
CANCER AG15-3 SERPL IA-ACNC: 1252 U/ML
CANCER AG27-29 SERPL-ACNC: 2121 U/ML
CHLORIDE SERPL-SCNC: 100 MMOL/L (ref 95–110)
CHLORIDE SERPL-SCNC: 104 MMOL/L (ref 95–110)
CHLORIDE SERPL-SCNC: 104 MMOL/L (ref 95–110)
CHLORIDE SERPL-SCNC: 107 MMOL/L (ref 95–110)
CHLORIDE SERPL-SCNC: 110 MMOL/L (ref 95–110)
CHLORIDE SERPL-SCNC: 113 MMOL/L (ref 95–110)
CHLORIDE SERPL-SCNC: 114 MMOL/L (ref 95–110)
CHLORIDE SERPL-SCNC: 115 MMOL/L (ref 95–110)
CLARITY UR: ABNORMAL
CLARITY UR: ABNORMAL
CO2 SERPL-SCNC: 16 MMOL/L (ref 23–29)
CO2 SERPL-SCNC: 17 MMOL/L (ref 23–29)
CO2 SERPL-SCNC: 18 MMOL/L (ref 23–29)
CO2 SERPL-SCNC: 18 MMOL/L (ref 23–29)
CO2 SERPL-SCNC: 19 MMOL/L (ref 23–29)
CO2 SERPL-SCNC: 21 MMOL/L (ref 23–29)
CO2 SERPL-SCNC: 22 MMOL/L (ref 23–29)
CO2 SERPL-SCNC: 27 MMOL/L (ref 23–29)
COLOR UR: YELLOW
COLOR UR: YELLOW
CREAT SERPL-MCNC: 0.6 MG/DL (ref 0.5–1.4)
CREAT SERPL-MCNC: 0.7 MG/DL (ref 0.5–1.4)
CREAT SERPL-MCNC: 0.7 MG/DL (ref 0.5–1.4)
CREAT SERPL-MCNC: 0.8 MG/DL (ref 0.5–1.4)
CREAT SERPL-MCNC: 1.2 MG/DL (ref 0.5–1.4)
CREAT SERPL-MCNC: 1.5 MG/DL (ref 0.5–1.4)
CTP QC/QA: YES
CTP QC/QA: YES
DIFFERENTIAL METHOD BLD: ABNORMAL
EOSINOPHIL # BLD AUTO: 0 K/UL (ref 0–0.5)
EOSINOPHIL # BLD AUTO: 0 K/UL (ref 0–0.5)
EOSINOPHIL # BLD AUTO: ABNORMAL K/UL (ref 0–0.5)
EOSINOPHIL NFR BLD: 0 % (ref 0–8)
ERYTHROCYTE [DISTWIDTH] IN BLOOD BY AUTOMATED COUNT: 17.3 % (ref 11.5–14.5)
ERYTHROCYTE [DISTWIDTH] IN BLOOD BY AUTOMATED COUNT: 17.5 % (ref 11.5–14.5)
ERYTHROCYTE [DISTWIDTH] IN BLOOD BY AUTOMATED COUNT: 17.9 % (ref 11.5–14.5)
ERYTHROCYTE [DISTWIDTH] IN BLOOD BY AUTOMATED COUNT: 17.9 % (ref 11.5–14.5)
ERYTHROCYTE [DISTWIDTH] IN BLOOD BY AUTOMATED COUNT: 18.3 % (ref 11.5–14.5)
ERYTHROCYTE [DISTWIDTH] IN BLOOD BY AUTOMATED COUNT: 18.5 % (ref 11.5–14.5)
EST. GFR  (NO RACE VARIABLE): 39 ML/MIN/1.73 M^2
EST. GFR  (NO RACE VARIABLE): 51 ML/MIN/1.73 M^2
EST. GFR  (NO RACE VARIABLE): >60 ML/MIN/1.73 M^2
FIO2: 21 %
FOLATE SERPL-MCNC: >40 NG/ML (ref 4–24)
GAMMA GLOB SERPL ELPH-MCNC: 0.97 G/DL (ref 0.67–1.58)
GLUCOSE SERPL-MCNC: 106 MG/DL (ref 70–110)
GLUCOSE SERPL-MCNC: 115 MG/DL (ref 70–110)
GLUCOSE SERPL-MCNC: 117 MG/DL (ref 70–110)
GLUCOSE SERPL-MCNC: 118 MG/DL (ref 70–110)
GLUCOSE SERPL-MCNC: 128 MG/DL (ref 70–110)
GLUCOSE SERPL-MCNC: 142 MG/DL (ref 70–110)
GLUCOSE SERPL-MCNC: 175 MG/DL (ref 70–110)
GLUCOSE SERPL-MCNC: 89 MG/DL (ref 70–110)
GLUCOSE UR QL STRIP: NEGATIVE
GLUCOSE UR QL STRIP: NEGATIVE
HCT VFR BLD AUTO: 38.6 % (ref 37–48.5)
HCT VFR BLD AUTO: 39.1 % (ref 37–48.5)
HCT VFR BLD AUTO: 39.2 % (ref 37–48.5)
HCT VFR BLD AUTO: 39.4 % (ref 37–48.5)
HCT VFR BLD AUTO: 40.4 % (ref 37–48.5)
HCT VFR BLD AUTO: 42.5 % (ref 37–48.5)
HCT VFR BLD CALC: 40.4 % (ref 36–54)
HGB BLD-MCNC: 12.5 G/DL (ref 12–16)
HGB BLD-MCNC: 12.6 G/DL (ref 12–16)
HGB BLD-MCNC: 12.8 G/DL (ref 12–16)
HGB BLD-MCNC: 12.9 G/DL (ref 12–16)
HGB BLD-MCNC: 13.2 G/DL (ref 9–18)
HGB BLD-MCNC: 13.3 G/DL (ref 12–16)
HGB BLD-MCNC: 14.3 G/DL (ref 12–16)
HGB UR QL STRIP: ABNORMAL
HGB UR QL STRIP: ABNORMAL
HYALINE CASTS #/AREA URNS LPF: 0 /LPF
IGA SERPL-MCNC: 461 MG/DL (ref 40–350)
IGG SERPL-MCNC: 1004 MG/DL (ref 650–1600)
IGM SERPL-MCNC: 25 MG/DL (ref 50–300)
IMM GRANULOCYTES # BLD AUTO: 0.06 K/UL (ref 0–0.04)
IMM GRANULOCYTES # BLD AUTO: 0.14 K/UL (ref 0–0.04)
IMM GRANULOCYTES # BLD AUTO: ABNORMAL K/UL (ref 0–0.04)
IMM GRANULOCYTES NFR BLD AUTO: 0.6 % (ref 0–0.5)
IMM GRANULOCYTES NFR BLD AUTO: 0.9 % (ref 0–0.5)
IMM GRANULOCYTES NFR BLD AUTO: ABNORMAL % (ref 0–0.5)
INTERPRETATION SERPL IFE-IMP: NORMAL
INTERPRETATION SERPL IFE-IMP: NORMAL
INTERPRETATION UR IFE-IMP: NORMAL
KAPPA LC SER QL IA: 3.79 MG/DL (ref 0.33–1.94)
KAPPA LC/LAMBDA SER IA: 1.39 (ref 0.26–1.65)
KETONES UR QL STRIP: ABNORMAL
KETONES UR QL STRIP: ABNORMAL
LACTATE SERPL-SCNC: 2.5 MMOL/L (ref 0.5–2.2)
LACTATE SERPL-SCNC: 2.6 MMOL/L (ref 0.5–2.2)
LACTATE SERPL-SCNC: 3.1 MMOL/L (ref 0.5–2.2)
LACTATE SERPL-SCNC: 3.2 MMOL/L (ref 0.5–2.2)
LAMBDA LC SER QL IA: 2.73 MG/DL (ref 0.57–2.63)
LDH SERPL L TO P-CCNC: 3.7 MMOL/L (ref 0.4–1.3)
LDH SERPL L TO P-CCNC: 431 U/L (ref 110–260)
LEUKOCYTE ESTERASE UR QL STRIP: ABNORMAL
LEUKOCYTE ESTERASE UR QL STRIP: ABNORMAL
LIPASE SERPL-CCNC: 10 U/L (ref 4–60)
LYMPHOCYTES # BLD AUTO: 1.2 K/UL (ref 1–4.8)
LYMPHOCYTES # BLD AUTO: 3.7 K/UL (ref 1–4.8)
LYMPHOCYTES # BLD AUTO: ABNORMAL K/UL (ref 1–4.8)
LYMPHOCYTES NFR BLD: 10 % (ref 18–48)
LYMPHOCYTES NFR BLD: 12 % (ref 18–48)
LYMPHOCYTES NFR BLD: 22.2 % (ref 18–48)
MAGNESIUM SERPL-MCNC: 2 MG/DL (ref 1.6–2.6)
MAGNESIUM SERPL-MCNC: 2.1 MG/DL (ref 1.6–2.6)
MAGNESIUM SERPL-MCNC: 2.2 MG/DL (ref 1.6–2.6)
MAGNESIUM SERPL-MCNC: 2.2 MG/DL (ref 1.6–2.6)
MCH RBC QN AUTO: 27.8 PG (ref 27–31)
MCH RBC QN AUTO: 27.9 PG (ref 27–31)
MCH RBC QN AUTO: 28 PG (ref 27–31)
MCH RBC QN AUTO: 28.1 PG (ref 27–31)
MCH RBC QN AUTO: 28.2 PG (ref 27–31)
MCH RBC QN AUTO: 28.3 PG (ref 27–31)
MCHC RBC AUTO-ENTMCNC: 32 G/DL (ref 32–36)
MCHC RBC AUTO-ENTMCNC: 32.4 G/DL (ref 32–36)
MCHC RBC AUTO-ENTMCNC: 32.7 G/DL (ref 32–36)
MCHC RBC AUTO-ENTMCNC: 32.9 G/DL (ref 32–36)
MCHC RBC AUTO-ENTMCNC: 32.9 G/DL (ref 32–36)
MCHC RBC AUTO-ENTMCNC: 33.6 G/DL (ref 32–36)
MCV RBC AUTO: 84 FL (ref 82–98)
MCV RBC AUTO: 85 FL (ref 82–98)
MCV RBC AUTO: 86 FL (ref 82–98)
MCV RBC AUTO: 87 FL (ref 82–98)
MICROSCOPIC COMMENT: ABNORMAL
MICROSCOPIC COMMENT: ABNORMAL
MONOCYTES # BLD AUTO: 0.3 K/UL (ref 0.3–1)
MONOCYTES # BLD AUTO: 1.3 K/UL (ref 0.3–1)
MONOCYTES # BLD AUTO: ABNORMAL K/UL (ref 0.3–1)
MONOCYTES NFR BLD: 10 % (ref 4–15)
MONOCYTES NFR BLD: 3.2 % (ref 4–15)
MONOCYTES NFR BLD: 7.9 % (ref 4–15)
NEUTROPHILS # BLD AUTO: 11.4 K/UL (ref 1.8–7.7)
NEUTROPHILS # BLD AUTO: 8.3 K/UL (ref 1.8–7.7)
NEUTROPHILS NFR BLD: 68.8 % (ref 38–73)
NEUTROPHILS NFR BLD: 80 % (ref 38–73)
NEUTROPHILS NFR BLD: 83.8 % (ref 38–73)
NITRITE UR QL STRIP: POSITIVE
NITRITE UR QL STRIP: POSITIVE
NRBC BLD-RTO: 0 /100 WBC
PATHOLOGIST INTERPRETATION IFE: NORMAL
PATHOLOGIST INTERPRETATION IFE: NORMAL
PATHOLOGIST INTERPRETATION SPE: NORMAL
PATHOLOGIST INTERPRETATION UIFE: NORMAL
PCO2 BLDA: 32.8 MMHG (ref 35–45)
PH SMN: 7.39 [PH] (ref 7.35–7.45)
PH UR STRIP: 5 [PH] (ref 5–8)
PH UR STRIP: 6 [PH] (ref 5–8)
PHOSPHATE SERPL-MCNC: 2.9 MG/DL (ref 2.7–4.5)
PHOSPHATE SERPL-MCNC: 3 MG/DL (ref 2.7–4.5)
PHOSPHATE SERPL-MCNC: 4.1 MG/DL (ref 2.7–4.5)
PLATELET # BLD AUTO: 323 K/UL (ref 150–450)
PLATELET # BLD AUTO: 342 K/UL (ref 150–450)
PLATELET # BLD AUTO: 409 K/UL (ref 150–450)
PLATELET # BLD AUTO: 424 K/UL (ref 150–450)
PLATELET # BLD AUTO: 425 K/UL (ref 150–450)
PLATELET # BLD AUTO: 429 K/UL (ref 150–450)
PLATELET BLD QL SMEAR: ABNORMAL
PMV BLD AUTO: 8.7 FL (ref 9.2–12.9)
PMV BLD AUTO: 9.5 FL (ref 9.2–12.9)
PMV BLD AUTO: 9.6 FL (ref 9.2–12.9)
PMV BLD AUTO: 9.7 FL (ref 9.2–12.9)
PMV BLD AUTO: 9.8 FL (ref 9.2–12.9)
PMV BLD AUTO: 9.8 FL (ref 9.2–12.9)
PO2 BLDA: 40.5 MMHG (ref 80–100)
POC BASE DEFICIT: -4.3 MMOL/L (ref -2–2)
POC HCO3: 19.8 MMOL/L (ref 24–28)
POC IONIZED CALCIUM: 2.13 MMOL/L (ref 1.06–1.42)
POC MOLECULAR INFLUENZA A AGN: NEGATIVE
POC MOLECULAR INFLUENZA B AGN: NEGATIVE
POC PERFORMED BY: ABNORMAL
POC SATURATED O2: 70.1 % (ref 95–100)
POCT GLUCOSE: 172 MG/DL (ref 70–110)
POTASSIUM BLD-SCNC: 3 MMOL/L (ref 3.5–5.1)
POTASSIUM SERPL-SCNC: 3.1 MMOL/L (ref 3.5–5.1)
POTASSIUM SERPL-SCNC: 3.3 MMOL/L (ref 3.5–5.1)
POTASSIUM SERPL-SCNC: 3.5 MMOL/L (ref 3.5–5.1)
POTASSIUM SERPL-SCNC: 3.7 MMOL/L (ref 3.5–5.1)
POTASSIUM SERPL-SCNC: 3.8 MMOL/L (ref 3.5–5.1)
POTASSIUM SERPL-SCNC: 4 MMOL/L (ref 3.5–5.1)
POTASSIUM SERPL-SCNC: 4.2 MMOL/L (ref 3.5–5.1)
POTASSIUM SERPL-SCNC: 4.5 MMOL/L (ref 3.5–5.1)
PROT SERPL-MCNC: 6.2 G/DL (ref 6–8.4)
PROT SERPL-MCNC: 6.4 G/DL (ref 6–8.4)
PROT SERPL-MCNC: 6.9 G/DL (ref 6–8.4)
PROT SERPL-MCNC: 7.2 G/DL (ref 6–8.4)
PROT SERPL-MCNC: 7.4 G/DL (ref 6–8.4)
PROT UR QL STRIP: ABNORMAL
PROT UR QL STRIP: ABNORMAL
PROT UR-MCNC: 17 MG/DL (ref 0–15)
PTH RELATED PROT SERPL-SCNC: 9.8 PMOL/L
PTH-INTACT SERPL-MCNC: 35.4 PG/ML (ref 9–77)
RBC # BLD AUTO: 4.47 M/UL (ref 4–5.4)
RBC # BLD AUTO: 4.54 M/UL (ref 4–5.4)
RBC # BLD AUTO: 4.55 M/UL (ref 4–5.4)
RBC # BLD AUTO: 4.57 M/UL (ref 4–5.4)
RBC # BLD AUTO: 4.77 M/UL (ref 4–5.4)
RBC # BLD AUTO: 5.06 M/UL (ref 4–5.4)
RBC #/AREA URNS HPF: 0 /HPF (ref 0–4)
RBC #/AREA URNS HPF: 71 /HPF (ref 0–4)
SARS-COV-2 RDRP RESP QL NAA+PROBE: NEGATIVE
SODIUM BLD-SCNC: 148 MMOL/L (ref 136–145)
SODIUM SERPL-SCNC: 138 MMOL/L (ref 136–145)
SODIUM SERPL-SCNC: 139 MMOL/L (ref 136–145)
SODIUM SERPL-SCNC: 139 MMOL/L (ref 136–145)
SODIUM SERPL-SCNC: 140 MMOL/L (ref 136–145)
SODIUM SERPL-SCNC: 140 MMOL/L (ref 136–145)
SODIUM SERPL-SCNC: 141 MMOL/L (ref 136–145)
SODIUM SERPL-SCNC: 144 MMOL/L (ref 136–145)
SODIUM SERPL-SCNC: 146 MMOL/L (ref 136–145)
SP GR UR STRIP: 1.02 (ref 1–1.03)
SP GR UR STRIP: 1.02 (ref 1–1.03)
SPECIMEN SOURCE: ABNORMAL
SQUAMOUS #/AREA URNS HPF: 3 /HPF
T4 FREE SERPL-MCNC: 0.49 NG/DL (ref 0.71–1.51)
TROPONIN I SERPL DL<=0.01 NG/ML-MCNC: 0.18 NG/ML (ref 0–0.03)
TROPONIN I SERPL DL<=0.01 NG/ML-MCNC: 0.21 NG/ML (ref 0–0.03)
TROPONIN I SERPL DL<=0.01 NG/ML-MCNC: 0.21 NG/ML (ref 0–0.03)
TROPONIN I SERPL DL<=0.01 NG/ML-MCNC: 0.22 NG/ML (ref 0–0.03)
TSH SERPL DL<=0.005 MIU/L-ACNC: 0.73 UIU/ML (ref 0.4–4)
TSH SERPL DL<=0.005 MIU/L-ACNC: 40.63 UIU/ML (ref 0.4–4)
URN SPEC COLLECT METH UR: ABNORMAL
URN SPEC COLLECT METH UR: ABNORMAL
UROBILINOGEN UR STRIP-ACNC: ABNORMAL EU/DL
UROBILINOGEN UR STRIP-ACNC: NEGATIVE EU/DL
VIT B1 BLD-MCNC: 46 UG/L (ref 38–122)
WBC # BLD AUTO: 10.18 K/UL (ref 3.9–12.7)
WBC # BLD AUTO: 11.3 K/UL (ref 3.9–12.7)
WBC # BLD AUTO: 13.96 K/UL (ref 3.9–12.7)
WBC # BLD AUTO: 16.47 K/UL (ref 3.9–12.7)
WBC # BLD AUTO: 9.95 K/UL (ref 3.9–12.7)
WBC # BLD AUTO: 9.98 K/UL (ref 3.9–12.7)
WBC #/AREA URNS HPF: 0 /HPF (ref 0–5)
WBC #/AREA URNS HPF: 43 /HPF (ref 0–5)

## 2025-01-01 PROCEDURE — 25000003 PHARM REV CODE 250: Performed by: STUDENT IN AN ORGANIZED HEALTH CARE EDUCATION/TRAINING PROGRAM

## 2025-01-01 PROCEDURE — 25000003 PHARM REV CODE 250: Performed by: INTERNAL MEDICINE

## 2025-01-01 PROCEDURE — 97110 THERAPEUTIC EXERCISES: CPT | Mod: CQ

## 2025-01-01 PROCEDURE — 25000003 PHARM REV CODE 250

## 2025-01-01 PROCEDURE — 94761 N-INVAS EAR/PLS OXIMETRY MLT: CPT

## 2025-01-01 PROCEDURE — 86300 IMMUNOASSAY TUMOR CA 15-3: CPT | Mod: 91 | Performed by: INTERNAL MEDICINE

## 2025-01-01 PROCEDURE — 83735 ASSAY OF MAGNESIUM: CPT | Performed by: STUDENT IN AN ORGANIZED HEALTH CARE EDUCATION/TRAINING PROGRAM

## 2025-01-01 PROCEDURE — 63600175 PHARM REV CODE 636 W HCPCS: Performed by: INTERNAL MEDICINE

## 2025-01-01 PROCEDURE — 99406 BEHAV CHNG SMOKING 3-10 MIN: CPT | Mod: S$GLB,,,

## 2025-01-01 PROCEDURE — 99153 MOD SED SAME PHYS/QHP EA: CPT

## 2025-01-01 PROCEDURE — 80048 BASIC METABOLIC PNL TOTAL CA: CPT | Performed by: FAMILY MEDICINE

## 2025-01-01 PROCEDURE — 94640 AIRWAY INHALATION TREATMENT: CPT

## 2025-01-01 PROCEDURE — 93010 ELECTROCARDIOGRAM REPORT: CPT | Mod: ,,, | Performed by: INTERNAL MEDICINE

## 2025-01-01 PROCEDURE — 25000242 PHARM REV CODE 250 ALT 637 W/ HCPCS

## 2025-01-01 PROCEDURE — 96372 THER/PROPH/DIAG INJ SC/IM: CPT | Performed by: STUDENT IN AN ORGANIZED HEALTH CARE EDUCATION/TRAINING PROGRAM

## 2025-01-01 PROCEDURE — 63600175 PHARM REV CODE 636 W HCPCS: Performed by: STUDENT IN AN ORGANIZED HEALTH CARE EDUCATION/TRAINING PROGRAM

## 2025-01-01 PROCEDURE — 82330 ASSAY OF CALCIUM: CPT | Performed by: STUDENT IN AN ORGANIZED HEALTH CARE EDUCATION/TRAINING PROGRAM

## 2025-01-01 PROCEDURE — 83970 ASSAY OF PARATHORMONE: CPT | Performed by: STUDENT IN AN ORGANIZED HEALTH CARE EDUCATION/TRAINING PROGRAM

## 2025-01-01 PROCEDURE — 87086 URINE CULTURE/COLONY COUNT: CPT | Performed by: EMERGENCY MEDICINE

## 2025-01-01 PROCEDURE — 83605 ASSAY OF LACTIC ACID: CPT | Performed by: STUDENT IN AN ORGANIZED HEALTH CARE EDUCATION/TRAINING PROGRAM

## 2025-01-01 PROCEDURE — 11000001 HC ACUTE MED/SURG PRIVATE ROOM

## 2025-01-01 PROCEDURE — 99407 BEHAV CHNG SMOKING > 10 MIN: CPT | Mod: S$GLB,,,

## 2025-01-01 PROCEDURE — 20220 BONE BIOPSY TROCAR/NDL SUPFC: CPT | Mod: ,,, | Performed by: RADIOLOGY

## 2025-01-01 PROCEDURE — 80053 COMPREHEN METABOLIC PANEL: CPT | Performed by: STUDENT IN AN ORGANIZED HEALTH CARE EDUCATION/TRAINING PROGRAM

## 2025-01-01 PROCEDURE — 97530 THERAPEUTIC ACTIVITIES: CPT | Mod: CO

## 2025-01-01 PROCEDURE — 87040 BLOOD CULTURE FOR BACTERIA: CPT | Performed by: STUDENT IN AN ORGANIZED HEALTH CARE EDUCATION/TRAINING PROGRAM

## 2025-01-01 PROCEDURE — 84443 ASSAY THYROID STIM HORMONE: CPT

## 2025-01-01 PROCEDURE — 20000000 HC ICU ROOM

## 2025-01-01 PROCEDURE — 63600175 PHARM REV CODE 636 W HCPCS: Mod: TB | Performed by: STUDENT IN AN ORGANIZED HEALTH CARE EDUCATION/TRAINING PROGRAM

## 2025-01-01 PROCEDURE — 86334 IMMUNOFIX E-PHORESIS SERUM: CPT | Performed by: INTERNAL MEDICINE

## 2025-01-01 PROCEDURE — 99900035 HC TECH TIME PER 15 MIN (STAT)

## 2025-01-01 PROCEDURE — 93005 ELECTROCARDIOGRAM TRACING: CPT

## 2025-01-01 PROCEDURE — 25000242 PHARM REV CODE 250 ALT 637 W/ HCPCS: Performed by: FAMILY MEDICINE

## 2025-01-01 PROCEDURE — 27000190 HC CPAP FULL FACE MASK W/VALVE

## 2025-01-01 PROCEDURE — 27000221 HC OXYGEN, UP TO 24 HOURS

## 2025-01-01 PROCEDURE — G0378 HOSPITAL OBSERVATION PER HR: HCPCS

## 2025-01-01 PROCEDURE — 84484 ASSAY OF TROPONIN QUANT: CPT | Mod: 91

## 2025-01-01 PROCEDURE — 82746 ASSAY OF FOLIC ACID SERUM: CPT | Performed by: STUDENT IN AN ORGANIZED HEALTH CARE EDUCATION/TRAINING PROGRAM

## 2025-01-01 PROCEDURE — 97116 GAIT TRAINING THERAPY: CPT | Mod: CQ

## 2025-01-01 PROCEDURE — 84165 PROTEIN E-PHORESIS SERUM: CPT | Mod: 26,,, | Performed by: PATHOLOGY

## 2025-01-01 PROCEDURE — 02HV33Z INSERTION OF INFUSION DEVICE INTO SUPERIOR VENA CAVA, PERCUTANEOUS APPROACH: ICD-10-PCS | Performed by: EMERGENCY MEDICINE

## 2025-01-01 PROCEDURE — 25000003 PHARM REV CODE 250: Performed by: EMERGENCY MEDICINE

## 2025-01-01 PROCEDURE — 82397 CHEMILUMINESCENT ASSAY: CPT | Performed by: INTERNAL MEDICINE

## 2025-01-01 PROCEDURE — 25000242 PHARM REV CODE 250 ALT 637 W/ HCPCS: Performed by: EMERGENCY MEDICINE

## 2025-01-01 PROCEDURE — 94799 UNLISTED PULMONARY SVC/PX: CPT

## 2025-01-01 PROCEDURE — 96365 THER/PROPH/DIAG IV INF INIT: CPT

## 2025-01-01 PROCEDURE — 63600175 PHARM REV CODE 636 W HCPCS: Performed by: FAMILY MEDICINE

## 2025-01-01 PROCEDURE — 77012 CT SCAN FOR NEEDLE BIOPSY: CPT | Mod: 26,,, | Performed by: RADIOLOGY

## 2025-01-01 PROCEDURE — 84100 ASSAY OF PHOSPHORUS: CPT

## 2025-01-01 PROCEDURE — 86300 IMMUNOASSAY TUMOR CA 15-3: CPT | Performed by: INTERNAL MEDICINE

## 2025-01-01 PROCEDURE — 97535 SELF CARE MNGMENT TRAINING: CPT | Mod: CO

## 2025-01-01 PROCEDURE — 87186 SC STD MICRODIL/AGAR DIL: CPT | Performed by: EMERGENCY MEDICINE

## 2025-01-01 PROCEDURE — 99285 EMERGENCY DEPT VISIT HI MDM: CPT | Mod: 25

## 2025-01-01 PROCEDURE — 5A09357 ASSISTANCE WITH RESPIRATORY VENTILATION, LESS THAN 24 CONSECUTIVE HOURS, CONTINUOUS POSITIVE AIRWAY PRESSURE: ICD-10-PCS | Performed by: HOSPITALIST

## 2025-01-01 PROCEDURE — 99223 1ST HOSP IP/OBS HIGH 75: CPT | Mod: 25,NSCH,, | Performed by: PHYSICIAN ASSISTANT

## 2025-01-01 PROCEDURE — 25500020 PHARM REV CODE 255: Performed by: INTERNAL MEDICINE

## 2025-01-01 PROCEDURE — 83605 ASSAY OF LACTIC ACID: CPT | Performed by: EMERGENCY MEDICINE

## 2025-01-01 PROCEDURE — 36415 COLL VENOUS BLD VENIPUNCTURE: CPT | Performed by: STUDENT IN AN ORGANIZED HEALTH CARE EDUCATION/TRAINING PROGRAM

## 2025-01-01 PROCEDURE — 94799 UNLISTED PULMONARY SVC/PX: CPT | Mod: XB

## 2025-01-01 PROCEDURE — 97162 PT EVAL MOD COMPLEX 30 MIN: CPT

## 2025-01-01 PROCEDURE — 80053 COMPREHEN METABOLIC PANEL: CPT | Performed by: EMERGENCY MEDICINE

## 2025-01-01 PROCEDURE — S4991 NICOTINE PATCH NONLEGEND: HCPCS | Performed by: INTERNAL MEDICINE

## 2025-01-01 PROCEDURE — 83690 ASSAY OF LIPASE: CPT | Performed by: EMERGENCY MEDICINE

## 2025-01-01 PROCEDURE — 97116 GAIT TRAINING THERAPY: CPT

## 2025-01-01 PROCEDURE — 83615 LACTATE (LD) (LDH) ENZYME: CPT | Performed by: STUDENT IN AN ORGANIZED HEALTH CARE EDUCATION/TRAINING PROGRAM

## 2025-01-01 PROCEDURE — 0QB33ZX EXCISION OF LEFT PELVIC BONE, PERCUTANEOUS APPROACH, DIAGNOSTIC: ICD-10-PCS | Performed by: RADIOLOGY

## 2025-01-01 PROCEDURE — 25000003 PHARM REV CODE 250: Performed by: HOSPITALIST

## 2025-01-01 PROCEDURE — 84156 ASSAY OF PROTEIN URINE: CPT | Performed by: STUDENT IN AN ORGANIZED HEALTH CARE EDUCATION/TRAINING PROGRAM

## 2025-01-01 PROCEDURE — 97112 NEUROMUSCULAR REEDUCATION: CPT | Mod: CQ

## 2025-01-01 PROCEDURE — A9585 GADOBUTROL INJECTION: HCPCS | Performed by: STUDENT IN AN ORGANIZED HEALTH CARE EDUCATION/TRAINING PROGRAM

## 2025-01-01 PROCEDURE — 25500020 PHARM REV CODE 255: Performed by: STUDENT IN AN ORGANIZED HEALTH CARE EDUCATION/TRAINING PROGRAM

## 2025-01-01 PROCEDURE — 63600175 PHARM REV CODE 636 W HCPCS

## 2025-01-01 PROCEDURE — 99223 1ST HOSP IP/OBS HIGH 75: CPT | Mod: ,,, | Performed by: STUDENT IN AN ORGANIZED HEALTH CARE EDUCATION/TRAINING PROGRAM

## 2025-01-01 PROCEDURE — 86335 IMMUNFIX E-PHORSIS/URINE/CSF: CPT | Performed by: STUDENT IN AN ORGANIZED HEALTH CARE EDUCATION/TRAINING PROGRAM

## 2025-01-01 PROCEDURE — 99223 1ST HOSP IP/OBS HIGH 75: CPT | Mod: ,,, | Performed by: INTERNAL MEDICINE

## 2025-01-01 PROCEDURE — 85027 COMPLETE CBC AUTOMATED: CPT

## 2025-01-01 PROCEDURE — 87502 INFLUENZA DNA AMP PROBE: CPT

## 2025-01-01 PROCEDURE — 86334 IMMUNOFIX E-PHORESIS SERUM: CPT | Performed by: STUDENT IN AN ORGANIZED HEALTH CARE EDUCATION/TRAINING PROGRAM

## 2025-01-01 PROCEDURE — 99498 ADVNCD CARE PLAN ADDL 30 MIN: CPT | Mod: ,,, | Performed by: STUDENT IN AN ORGANIZED HEALTH CARE EDUCATION/TRAINING PROGRAM

## 2025-01-01 PROCEDURE — 63600175 PHARM REV CODE 636 W HCPCS: Performed by: HOSPITALIST

## 2025-01-01 PROCEDURE — 87635 SARS-COV-2 COVID-19 AMP PRB: CPT | Performed by: EMERGENCY MEDICINE

## 2025-01-01 PROCEDURE — 27100171 HC OXYGEN HIGH FLOW UP TO 24 HOURS

## 2025-01-01 PROCEDURE — 83735 ASSAY OF MAGNESIUM: CPT

## 2025-01-01 PROCEDURE — 86334 IMMUNOFIX E-PHORESIS SERUM: CPT | Mod: 26,,, | Performed by: PATHOLOGY

## 2025-01-01 PROCEDURE — 80048 BASIC METABOLIC PNL TOTAL CA: CPT

## 2025-01-01 PROCEDURE — 1152F DOC ADVNCD DIS COMFORT 1ST: CPT | Mod: CPTII,,, | Performed by: STUDENT IN AN ORGANIZED HEALTH CARE EDUCATION/TRAINING PROGRAM

## 2025-01-01 PROCEDURE — 83521 IG LIGHT CHAINS FREE EACH: CPT | Mod: 59 | Performed by: INTERNAL MEDICINE

## 2025-01-01 PROCEDURE — 36556 INSERT NON-TUNNEL CV CATH: CPT

## 2025-01-01 PROCEDURE — 1153F DOC ADVNCD DIS CMFRT NOT 1ST: CPT | Mod: CPTII,,, | Performed by: STUDENT IN AN ORGANIZED HEALTH CARE EDUCATION/TRAINING PROGRAM

## 2025-01-01 PROCEDURE — 84100 ASSAY OF PHOSPHORUS: CPT | Performed by: STUDENT IN AN ORGANIZED HEALTH CARE EDUCATION/TRAINING PROGRAM

## 2025-01-01 PROCEDURE — 88333 PATH CONSLTJ SURG CYTO XM 1: CPT | Performed by: PATHOLOGY

## 2025-01-01 PROCEDURE — 1158F ADVNC CARE PLAN TLK DOCD: CPT | Mod: CPTII,,, | Performed by: STUDENT IN AN ORGANIZED HEALTH CARE EDUCATION/TRAINING PROGRAM

## 2025-01-01 PROCEDURE — 20220 BONE BIOPSY TROCAR/NDL SUPFC: CPT

## 2025-01-01 PROCEDURE — 96361 HYDRATE IV INFUSION ADD-ON: CPT

## 2025-01-01 PROCEDURE — 97530 THERAPEUTIC ACTIVITIES: CPT

## 2025-01-01 PROCEDURE — 81000 URINALYSIS NONAUTO W/SCOPE: CPT

## 2025-01-01 PROCEDURE — 88360 TUMOR IMMUNOHISTOCHEM/MANUAL: CPT | Performed by: PATHOLOGY

## 2025-01-01 PROCEDURE — B548ZZA ULTRASONOGRAPHY OF SUPERIOR VENA CAVA, GUIDANCE: ICD-10-PCS | Performed by: EMERGENCY MEDICINE

## 2025-01-01 PROCEDURE — 99497 ADVNCD CARE PLAN 30 MIN: CPT | Mod: 25,,, | Performed by: STUDENT IN AN ORGANIZED HEALTH CARE EDUCATION/TRAINING PROGRAM

## 2025-01-01 PROCEDURE — 94660 CPAP INITIATION&MGMT: CPT

## 2025-01-01 PROCEDURE — 83880 ASSAY OF NATRIURETIC PEPTIDE: CPT | Performed by: EMERGENCY MEDICINE

## 2025-01-01 PROCEDURE — 63600175 PHARM REV CODE 636 W HCPCS: Performed by: RADIOLOGY

## 2025-01-01 PROCEDURE — A9585 GADOBUTROL INJECTION: HCPCS | Performed by: INTERNAL MEDICINE

## 2025-01-01 PROCEDURE — 84165 PROTEIN E-PHORESIS SERUM: CPT | Performed by: INTERNAL MEDICINE

## 2025-01-01 PROCEDURE — 85027 COMPLETE CBC AUTOMATED: CPT | Performed by: STUDENT IN AN ORGANIZED HEALTH CARE EDUCATION/TRAINING PROGRAM

## 2025-01-01 PROCEDURE — 84439 ASSAY OF FREE THYROXINE: CPT

## 2025-01-01 PROCEDURE — 85025 COMPLETE CBC W/AUTO DIFF WBC: CPT | Performed by: EMERGENCY MEDICINE

## 2025-01-01 PROCEDURE — 99152 MOD SED SAME PHYS/QHP 5/>YRS: CPT

## 2025-01-01 PROCEDURE — 94761 N-INVAS EAR/PLS OXIMETRY MLT: CPT | Mod: XB

## 2025-01-01 PROCEDURE — 88305 TISSUE EXAM BY PATHOLOGIST: CPT | Performed by: PATHOLOGY

## 2025-01-01 PROCEDURE — 83605 ASSAY OF LACTIC ACID: CPT

## 2025-01-01 PROCEDURE — 36415 COLL VENOUS BLD VENIPUNCTURE: CPT | Performed by: FAMILY MEDICINE

## 2025-01-01 PROCEDURE — 86335 IMMUNFIX E-PHORSIS/URINE/CSF: CPT | Mod: 26,,, | Performed by: PATHOLOGY

## 2025-01-01 PROCEDURE — 85007 BL SMEAR W/DIFF WBC COUNT: CPT

## 2025-01-01 PROCEDURE — 51798 US URINE CAPACITY MEASURE: CPT

## 2025-01-01 PROCEDURE — 82962 GLUCOSE BLOOD TEST: CPT

## 2025-01-01 PROCEDURE — 97165 OT EVAL LOW COMPLEX 30 MIN: CPT

## 2025-01-01 PROCEDURE — 82784 ASSAY IGA/IGD/IGG/IGM EACH: CPT | Mod: 59 | Performed by: INTERNAL MEDICINE

## 2025-01-01 PROCEDURE — 25000003 PHARM REV CODE 250: Performed by: RADIOLOGY

## 2025-01-01 PROCEDURE — 80053 COMPREHEN METABOLIC PANEL: CPT | Mod: 91

## 2025-01-01 PROCEDURE — 63600175 PHARM REV CODE 636 W HCPCS: Performed by: EMERGENCY MEDICINE

## 2025-01-01 PROCEDURE — 5A0935A ASSISTANCE WITH RESPIRATORY VENTILATION, LESS THAN 24 CONSECUTIVE HOURS, HIGH NASAL FLOW/VELOCITY: ICD-10-PCS | Performed by: HOSPITALIST

## 2025-01-01 PROCEDURE — 84443 ASSAY THYROID STIM HORMONE: CPT | Performed by: STUDENT IN AN ORGANIZED HEALTH CARE EDUCATION/TRAINING PROGRAM

## 2025-01-01 PROCEDURE — 83605 ASSAY OF LACTIC ACID: CPT | Mod: 91

## 2025-01-01 PROCEDURE — 82397 CHEMILUMINESCENT ASSAY: CPT | Performed by: HOSPITALIST

## 2025-01-01 PROCEDURE — 87040 BLOOD CULTURE FOR BACTERIA: CPT | Mod: 59 | Performed by: EMERGENCY MEDICINE

## 2025-01-01 PROCEDURE — 88342 IMHCHEM/IMCYTCHM 1ST ANTB: CPT | Performed by: PATHOLOGY

## 2025-01-01 PROCEDURE — 81000 URINALYSIS NONAUTO W/SCOPE: CPT | Performed by: EMERGENCY MEDICINE

## 2025-01-01 PROCEDURE — 85025 COMPLETE CBC W/AUTO DIFF WBC: CPT

## 2025-01-01 PROCEDURE — 84484 ASSAY OF TROPONIN QUANT: CPT | Performed by: EMERGENCY MEDICINE

## 2025-01-01 PROCEDURE — 84425 ASSAY OF VITAMIN B-1: CPT | Performed by: HOSPITALIST

## 2025-01-01 PROCEDURE — 82803 BLOOD GASES ANY COMBINATION: CPT

## 2025-01-01 PROCEDURE — 97530 THERAPEUTIC ACTIVITIES: CPT | Mod: CQ

## 2025-01-01 PROCEDURE — 82232 ASSAY OF BETA-2 PROTEIN: CPT | Performed by: STUDENT IN AN ORGANIZED HEALTH CARE EDUCATION/TRAINING PROGRAM

## 2025-01-01 PROCEDURE — 87088 URINE BACTERIA CULTURE: CPT | Performed by: EMERGENCY MEDICINE

## 2025-01-01 PROCEDURE — 96372 THER/PROPH/DIAG INJ SC/IM: CPT

## 2025-01-01 PROCEDURE — 36415 COLL VENOUS BLD VENIPUNCTURE: CPT | Performed by: INTERNAL MEDICINE

## 2025-01-01 PROCEDURE — 84484 ASSAY OF TROPONIN QUANT: CPT

## 2025-01-01 PROCEDURE — 97110 THERAPEUTIC EXERCISES: CPT

## 2025-01-01 RX ORDER — THIAMINE HYDROCHLORIDE 100 MG/ML
200 INJECTION, SOLUTION INTRAMUSCULAR; INTRAVENOUS DAILY
Status: DISCONTINUED | OUTPATIENT
Start: 2025-01-01 | End: 2025-01-01

## 2025-01-01 RX ORDER — GADOBUTROL 604.72 MG/ML
6 INJECTION INTRAVENOUS
Status: COMPLETED | OUTPATIENT
Start: 2025-01-01 | End: 2025-01-01

## 2025-01-01 RX ORDER — FUROSEMIDE 10 MG/ML
40 INJECTION INTRAMUSCULAR; INTRAVENOUS EVERY 12 HOURS
Status: DISCONTINUED | OUTPATIENT
Start: 2025-01-01 | End: 2025-01-01 | Stop reason: HOSPADM

## 2025-01-01 RX ORDER — IPRATROPIUM BROMIDE AND ALBUTEROL SULFATE 2.5; .5 MG/3ML; MG/3ML
3 SOLUTION RESPIRATORY (INHALATION)
Status: DISCONTINUED | OUTPATIENT
Start: 2025-01-01 | End: 2025-01-01

## 2025-01-01 RX ORDER — ENOXAPARIN SODIUM 100 MG/ML
30 INJECTION SUBCUTANEOUS EVERY 24 HOURS
Status: DISCONTINUED | OUTPATIENT
Start: 2025-01-01 | End: 2025-02-28 | Stop reason: HOSPADM

## 2025-01-01 RX ORDER — MORPHINE SULFATE 4 MG/ML
4 INJECTION, SOLUTION INTRAMUSCULAR; INTRAVENOUS EVERY 6 HOURS
Status: DISCONTINUED | OUTPATIENT
Start: 2025-01-01 | End: 2025-01-01

## 2025-01-01 RX ORDER — IPRATROPIUM BROMIDE AND ALBUTEROL SULFATE 2.5; .5 MG/3ML; MG/3ML
3 SOLUTION RESPIRATORY (INHALATION)
Status: DISCONTINUED | OUTPATIENT
Start: 2025-01-01 | End: 2025-01-01 | Stop reason: HOSPADM

## 2025-01-01 RX ORDER — PROCHLORPERAZINE EDISYLATE 5 MG/ML
5 INJECTION INTRAMUSCULAR; INTRAVENOUS EVERY 6 HOURS PRN
Status: DISCONTINUED | OUTPATIENT
Start: 2025-01-01 | End: 2025-01-01

## 2025-01-01 RX ORDER — LEVOTHYROXINE SODIUM 150 UG/1
150 TABLET ORAL DAILY
Status: DISCONTINUED | OUTPATIENT
Start: 2025-01-01 | End: 2025-01-01

## 2025-01-01 RX ORDER — POLYETHYLENE GLYCOL 3350 17 G/17G
17 POWDER, FOR SOLUTION ORAL DAILY PRN
Status: DISCONTINUED | OUTPATIENT
Start: 2025-01-01 | End: 2025-01-01

## 2025-01-01 RX ORDER — MORPHINE SULFATE 4 MG/ML
4 INJECTION, SOLUTION INTRAMUSCULAR; INTRAVENOUS EVERY 4 HOURS PRN
Status: DISCONTINUED | OUTPATIENT
Start: 2025-01-01 | End: 2025-01-01

## 2025-01-01 RX ORDER — DEXTROSE MONOHYDRATE AND SODIUM CHLORIDE 5; .9 G/100ML; G/100ML
INJECTION, SOLUTION INTRAVENOUS CONTINUOUS
Status: DISCONTINUED | OUTPATIENT
Start: 2025-01-01 | End: 2025-01-01

## 2025-01-01 RX ORDER — DEXAMETHASONE 1 MG/1
2 TABLET ORAL EVERY 12 HOURS
Status: DISCONTINUED | OUTPATIENT
Start: 2025-01-01 | End: 2025-01-01 | Stop reason: HOSPADM

## 2025-01-01 RX ORDER — GLYCOPYRROLATE 0.2 MG/ML
0.1 INJECTION INTRAMUSCULAR; INTRAVENOUS 3 TIMES DAILY PRN
Status: DISCONTINUED | OUTPATIENT
Start: 2025-01-01 | End: 2025-02-28 | Stop reason: HOSPADM

## 2025-01-01 RX ORDER — ENOXAPARIN SODIUM 100 MG/ML
40 INJECTION SUBCUTANEOUS EVERY 24 HOURS
Status: DISCONTINUED | OUTPATIENT
Start: 2025-01-01 | End: 2025-01-01 | Stop reason: HOSPADM

## 2025-01-01 RX ORDER — FENTANYL CITRATE 50 UG/ML
INJECTION, SOLUTION INTRAMUSCULAR; INTRAVENOUS
Status: COMPLETED | OUTPATIENT
Start: 2025-01-01 | End: 2025-01-01

## 2025-01-01 RX ORDER — MORPHINE SULFATE 4 MG/ML
4 INJECTION, SOLUTION INTRAMUSCULAR; INTRAVENOUS
Status: DISCONTINUED | OUTPATIENT
Start: 2025-01-01 | End: 2025-02-28 | Stop reason: HOSPADM

## 2025-01-01 RX ORDER — GLUCAGON 1 MG
1 KIT INJECTION
Status: DISCONTINUED | OUTPATIENT
Start: 2025-01-01 | End: 2025-01-01 | Stop reason: HOSPADM

## 2025-01-01 RX ORDER — OXYCODONE AND ACETAMINOPHEN 10; 325 MG/1; MG/1
1 TABLET ORAL EVERY 8 HOURS PRN
Qty: 25 TABLET | Refills: 0 | Status: SHIPPED | OUTPATIENT
Start: 2025-01-01 | End: 2025-01-01

## 2025-01-01 RX ORDER — LEVOTHYROXINE SODIUM 100 UG/1
100 TABLET ORAL
Status: DISCONTINUED | OUTPATIENT
Start: 2025-01-01 | End: 2025-01-01

## 2025-01-01 RX ORDER — ONDANSETRON HYDROCHLORIDE 2 MG/ML
4 INJECTION, SOLUTION INTRAVENOUS EVERY 8 HOURS PRN
Status: DISCONTINUED | OUTPATIENT
Start: 2025-01-01 | End: 2025-01-01

## 2025-01-01 RX ORDER — SODIUM CHLORIDE 0.9 % (FLUSH) 0.9 %
10 SYRINGE (ML) INJECTION EVERY 12 HOURS PRN
Status: DISCONTINUED | OUTPATIENT
Start: 2025-01-01 | End: 2025-02-28 | Stop reason: HOSPADM

## 2025-01-01 RX ORDER — IPRATROPIUM BROMIDE AND ALBUTEROL SULFATE 2.5; .5 MG/3ML; MG/3ML
3 SOLUTION RESPIRATORY (INHALATION)
Status: DISCONTINUED | OUTPATIENT
Start: 2025-01-01 | End: 2025-02-28 | Stop reason: HOSPADM

## 2025-01-01 RX ORDER — IPRATROPIUM BROMIDE 0.5 MG/2.5ML
0.5 SOLUTION RESPIRATORY (INHALATION) EVERY 6 HOURS PRN
Status: DISCONTINUED | OUTPATIENT
Start: 2025-01-01 | End: 2025-02-28 | Stop reason: HOSPADM

## 2025-01-01 RX ORDER — CALCITONIN SALMON 200 [USP'U]/ML
4 INJECTION, SOLUTION INTRAMUSCULAR; SUBCUTANEOUS EVERY 12 HOURS
Status: DISCONTINUED | OUTPATIENT
Start: 2025-01-01 | End: 2025-02-28 | Stop reason: HOSPADM

## 2025-01-01 RX ORDER — LANOLIN ALCOHOL/MO/W.PET/CERES
400 CREAM (GRAM) TOPICAL 2 TIMES DAILY
Status: DISCONTINUED | OUTPATIENT
Start: 2025-01-01 | End: 2025-01-01 | Stop reason: HOSPADM

## 2025-01-01 RX ORDER — DEXAMETHASONE SODIUM PHOSPHATE 4 MG/ML
6 INJECTION, SOLUTION INTRA-ARTICULAR; INTRALESIONAL; INTRAMUSCULAR; INTRAVENOUS; SOFT TISSUE EVERY 6 HOURS
Status: COMPLETED | OUTPATIENT
Start: 2025-01-01 | End: 2025-01-01

## 2025-01-01 RX ORDER — LEVOTHYROXINE SODIUM 75 UG/1
150 TABLET ORAL
Status: DISCONTINUED | OUTPATIENT
Start: 2025-01-01 | End: 2025-01-01 | Stop reason: HOSPADM

## 2025-01-01 RX ORDER — SODIUM CHLORIDE 9 MG/ML
INJECTION, SOLUTION INTRAVENOUS CONTINUOUS
Status: DISCONTINUED | OUTPATIENT
Start: 2025-01-01 | End: 2025-01-01

## 2025-01-01 RX ORDER — LISINOPRIL AND HYDROCHLOROTHIAZIDE 20; 25 MG/1; MG/1
1 TABLET ORAL DAILY
COMMUNITY
Start: 2025-01-01

## 2025-01-01 RX ORDER — LIDOCAINE HYDROCHLORIDE 10 MG/ML
1 INJECTION, SOLUTION EPIDURAL; INFILTRATION; INTRACAUDAL; PERINEURAL ONCE
Status: DISCONTINUED | OUTPATIENT
Start: 2025-01-01 | End: 2025-01-01 | Stop reason: HOSPADM

## 2025-01-01 RX ORDER — IBUPROFEN 200 MG
1 TABLET ORAL DAILY
Qty: 30 PATCH | Refills: 3 | Status: SHIPPED | OUTPATIENT
Start: 2025-01-01

## 2025-01-01 RX ORDER — FUROSEMIDE 10 MG/ML
60 INJECTION INTRAMUSCULAR; INTRAVENOUS EVERY 12 HOURS
Status: DISCONTINUED | OUTPATIENT
Start: 2025-01-01 | End: 2025-02-28 | Stop reason: HOSPADM

## 2025-01-01 RX ORDER — OLANZAPINE 10 MG/2ML
5 INJECTION, POWDER, FOR SOLUTION INTRAMUSCULAR ONCE AS NEEDED
Status: DISCONTINUED | OUTPATIENT
Start: 2025-01-01 | End: 2025-02-28 | Stop reason: HOSPADM

## 2025-01-01 RX ORDER — LEVOTHYROXINE SODIUM 75 UG/1
150 TABLET ORAL
Status: DISCONTINUED | OUTPATIENT
Start: 2025-01-01 | End: 2025-01-01

## 2025-01-01 RX ORDER — ASCORBIC ACID 500 MG
500 TABLET ORAL DAILY
Status: DISCONTINUED | OUTPATIENT
Start: 2025-01-01 | End: 2025-01-01 | Stop reason: HOSPADM

## 2025-01-01 RX ORDER — GLUCAGON 1 MG
1 KIT INJECTION
Status: DISCONTINUED | OUTPATIENT
Start: 2025-01-01 | End: 2025-02-28 | Stop reason: HOSPADM

## 2025-01-01 RX ORDER — IBUPROFEN 200 MG
24 TABLET ORAL
Status: DISCONTINUED | OUTPATIENT
Start: 2025-01-01 | End: 2025-01-01 | Stop reason: HOSPADM

## 2025-01-01 RX ORDER — BACLOFEN 5 MG/1
5 TABLET ORAL 3 TIMES DAILY
Status: DISCONTINUED | OUTPATIENT
Start: 2025-01-01 | End: 2025-01-01

## 2025-01-01 RX ORDER — ASCORBIC ACID 500 MG
500 TABLET ORAL DAILY
COMMUNITY

## 2025-01-01 RX ORDER — GABAPENTIN 300 MG/1
300 CAPSULE ORAL 2 TIMES DAILY
Status: DISCONTINUED | OUTPATIENT
Start: 2025-01-01 | End: 2025-01-01

## 2025-01-01 RX ORDER — METOCLOPRAMIDE HYDROCHLORIDE 5 MG/ML
5 INJECTION INTRAMUSCULAR; INTRAVENOUS EVERY 6 HOURS PRN
Status: DISCONTINUED | OUTPATIENT
Start: 2025-01-01 | End: 2025-01-01 | Stop reason: HOSPADM

## 2025-01-01 RX ORDER — GABAPENTIN 300 MG/1
300 CAPSULE ORAL 3 TIMES DAILY
Qty: 90 CAPSULE | Refills: 1 | Status: SHIPPED | OUTPATIENT
Start: 2025-01-01

## 2025-01-01 RX ORDER — LEVOTHYROXINE SODIUM 20 UG/ML
100 INJECTION, SOLUTION INTRAVENOUS DAILY
Status: DISCONTINUED | OUTPATIENT
Start: 2025-01-01 | End: 2025-01-01

## 2025-01-01 RX ORDER — IPRATROPIUM BROMIDE AND ALBUTEROL SULFATE 2.5; .5 MG/3ML; MG/3ML
3 SOLUTION RESPIRATORY (INHALATION)
Status: COMPLETED | OUTPATIENT
Start: 2025-01-01 | End: 2025-01-01

## 2025-01-01 RX ORDER — OXYCODONE AND ACETAMINOPHEN 5; 325 MG/1; MG/1
1 TABLET ORAL EVERY 6 HOURS PRN
Status: DISCONTINUED | OUTPATIENT
Start: 2025-01-01 | End: 2025-01-01

## 2025-01-01 RX ORDER — NALOXONE HCL 0.4 MG/ML
1 VIAL (ML) INJECTION
Status: ACTIVE | OUTPATIENT
Start: 2025-01-01 | End: 2025-01-01

## 2025-01-01 RX ORDER — NALOXONE HYDROCHLORIDE 1 MG/ML
INJECTION INTRAMUSCULAR; INTRAVENOUS; SUBCUTANEOUS
Status: COMPLETED
Start: 2025-01-01 | End: 2025-01-01

## 2025-01-01 RX ORDER — KETOROLAC TROMETHAMINE 30 MG/ML
30 INJECTION, SOLUTION INTRAMUSCULAR; INTRAVENOUS
Status: COMPLETED | OUTPATIENT
Start: 2025-01-01 | End: 2025-01-01

## 2025-01-01 RX ORDER — METHOCARBAMOL 500 MG/1
500 TABLET, FILM COATED ORAL
Status: COMPLETED | OUTPATIENT
Start: 2025-01-01 | End: 2025-01-01

## 2025-01-01 RX ORDER — IBUPROFEN 200 MG
1 TABLET ORAL DAILY
Status: DISCONTINUED | OUTPATIENT
Start: 2025-01-01 | End: 2025-01-01 | Stop reason: HOSPADM

## 2025-01-01 RX ORDER — METOCLOPRAMIDE HYDROCHLORIDE 5 MG/ML
5 INJECTION INTRAMUSCULAR; INTRAVENOUS EVERY 6 HOURS PRN
Status: DISCONTINUED | OUTPATIENT
Start: 2025-01-01 | End: 2025-02-28 | Stop reason: HOSPADM

## 2025-01-01 RX ORDER — PROPRANOLOL HYDROCHLORIDE 10 MG/1
10 TABLET ORAL 2 TIMES DAILY
Status: DISCONTINUED | OUTPATIENT
Start: 2025-01-01 | End: 2025-01-01 | Stop reason: HOSPADM

## 2025-01-01 RX ORDER — SODIUM CHLORIDE 9 MG/ML
INJECTION, SOLUTION INTRAVENOUS CONTINUOUS
Status: ACTIVE | OUTPATIENT
Start: 2025-01-01 | End: 2025-01-01

## 2025-01-01 RX ORDER — POLYETHYLENE GLYCOL 3350 17 G/17G
17 POWDER, FOR SOLUTION ORAL DAILY
Status: DISCONTINUED | OUTPATIENT
Start: 2025-01-01 | End: 2025-01-01

## 2025-01-01 RX ORDER — IBUPROFEN 200 MG
16 TABLET ORAL
Status: DISCONTINUED | OUTPATIENT
Start: 2025-01-01 | End: 2025-02-28 | Stop reason: HOSPADM

## 2025-01-01 RX ORDER — POTASSIUM CHLORIDE 7.45 MG/ML
40 INJECTION INTRAVENOUS
Status: COMPLETED | OUTPATIENT
Start: 2025-01-01 | End: 2025-01-01

## 2025-01-01 RX ORDER — LIDOCAINE HYDROCHLORIDE 10 MG/ML
1 INJECTION, SOLUTION EPIDURAL; INFILTRATION; INTRACAUDAL; PERINEURAL ONCE
OUTPATIENT
Start: 2025-01-01 | End: 2025-01-01

## 2025-01-01 RX ORDER — ONDANSETRON HYDROCHLORIDE 2 MG/ML
4 INJECTION, SOLUTION INTRAVENOUS EVERY 6 HOURS PRN
Status: DISCONTINUED | OUTPATIENT
Start: 2025-01-01 | End: 2025-01-01 | Stop reason: HOSPADM

## 2025-01-01 RX ORDER — LIDOCAINE HYDROCHLORIDE 10 MG/ML
INJECTION, SOLUTION INFILTRATION; PERINEURAL
Status: COMPLETED | OUTPATIENT
Start: 2025-01-01 | End: 2025-01-01

## 2025-01-01 RX ORDER — POTASSIUM CHLORIDE 20 MEQ/1
40 TABLET, EXTENDED RELEASE ORAL
Status: COMPLETED | OUTPATIENT
Start: 2025-01-01 | End: 2025-01-01

## 2025-01-01 RX ORDER — DEXAMETHASONE 1 MG/1
2 TABLET ORAL DAILY
Status: DISCONTINUED | OUTPATIENT
Start: 2025-01-01 | End: 2025-01-01

## 2025-01-01 RX ORDER — OXYCODONE AND ACETAMINOPHEN 5; 325 MG/1; MG/1
1 TABLET ORAL EVERY 4 HOURS PRN
Status: DISCONTINUED | OUTPATIENT
Start: 2025-01-01 | End: 2025-01-01 | Stop reason: HOSPADM

## 2025-01-01 RX ORDER — DEXAMETHASONE SODIUM PHOSPHATE 4 MG/ML
8 INJECTION, SOLUTION INTRA-ARTICULAR; INTRALESIONAL; INTRAMUSCULAR; INTRAVENOUS; SOFT TISSUE
Status: COMPLETED | OUTPATIENT
Start: 2025-01-01 | End: 2025-01-01

## 2025-01-01 RX ORDER — ACETAMINOPHEN 325 MG/1
650 TABLET ORAL EVERY 6 HOURS
Status: DISCONTINUED | OUTPATIENT
Start: 2025-01-01 | End: 2025-01-01 | Stop reason: HOSPADM

## 2025-01-01 RX ORDER — ACETAMINOPHEN 650 MG/1
650 SUPPOSITORY RECTAL EVERY 4 HOURS PRN
Status: DISCONTINUED | OUTPATIENT
Start: 2025-01-01 | End: 2025-02-28 | Stop reason: HOSPADM

## 2025-01-01 RX ORDER — DEXAMETHASONE 2 MG/1
2 TABLET ORAL DAILY
Qty: 20 TABLET | Refills: 1 | Status: SHIPPED | OUTPATIENT
Start: 2025-01-01

## 2025-01-01 RX ORDER — LEVOTHYROXINE SODIUM 150 UG/1
150 TABLET ORAL
Status: DISCONTINUED | OUTPATIENT
Start: 2025-02-28 | End: 2025-02-28 | Stop reason: HOSPADM

## 2025-01-01 RX ORDER — LORAZEPAM 2 MG/ML
2 INJECTION INTRAMUSCULAR EVERY 4 HOURS
Status: DISCONTINUED | OUTPATIENT
Start: 2025-01-01 | End: 2025-01-01

## 2025-01-01 RX ORDER — LEVOTHYROXINE SODIUM ANHYDROUS 100 UG/5ML
150 INJECTION, POWDER, LYOPHILIZED, FOR SOLUTION INTRAVENOUS DAILY
Status: DISCONTINUED | OUTPATIENT
Start: 2025-01-01 | End: 2025-01-01

## 2025-01-01 RX ORDER — SODIUM,POTASSIUM PHOSPHATES 280-250MG
1 POWDER IN PACKET (EA) ORAL ONCE
Status: DISCONTINUED | OUTPATIENT
Start: 2025-01-01 | End: 2025-01-01

## 2025-01-01 RX ORDER — SIMETHICONE 80 MG
1 TABLET,CHEWABLE ORAL 4 TIMES DAILY PRN
Status: DISCONTINUED | OUTPATIENT
Start: 2025-01-01 | End: 2025-01-01 | Stop reason: HOSPADM

## 2025-01-01 RX ORDER — ENOXAPARIN SODIUM 100 MG/ML
30 INJECTION SUBCUTANEOUS EVERY 24 HOURS
Status: DISCONTINUED | OUTPATIENT
Start: 2025-01-01 | End: 2025-01-01

## 2025-01-01 RX ORDER — THIAMINE HYDROCHLORIDE 100 MG/ML
400 INJECTION, SOLUTION INTRAMUSCULAR; INTRAVENOUS DAILY
Status: DISCONTINUED | OUTPATIENT
Start: 2025-01-01 | End: 2025-02-28 | Stop reason: HOSPADM

## 2025-01-01 RX ORDER — GABAPENTIN 300 MG/1
300 CAPSULE ORAL 2 TIMES DAILY
Status: ON HOLD | COMMUNITY
Start: 2025-01-01 | End: 2025-01-01

## 2025-01-01 RX ORDER — MIDAZOLAM HYDROCHLORIDE 1 MG/ML
INJECTION, SOLUTION INTRAMUSCULAR; INTRAVENOUS
Status: COMPLETED | OUTPATIENT
Start: 2025-01-01 | End: 2025-01-01

## 2025-01-01 RX ORDER — SODIUM CHLORIDE 0.9 % (FLUSH) 0.9 %
10 SYRINGE (ML) INJECTION EVERY 12 HOURS PRN
Status: DISCONTINUED | OUTPATIENT
Start: 2025-01-01 | End: 2025-01-01 | Stop reason: HOSPADM

## 2025-01-01 RX ORDER — DEXAMETHASONE 2 MG/1
2 TABLET ORAL DAILY
Qty: 20 TABLET | Refills: 1 | Status: SHIPPED | OUTPATIENT
Start: 2025-01-01 | End: 2025-01-01

## 2025-01-01 RX ORDER — FOLIC ACID 5 MG/ML
1 INJECTION, SOLUTION INTRAMUSCULAR; INTRAVENOUS; SUBCUTANEOUS DAILY
Status: DISCONTINUED | OUTPATIENT
Start: 2025-01-01 | End: 2025-01-01

## 2025-01-01 RX ORDER — LANOLIN ALCOHOL/MO/W.PET/CERES
1000 CREAM (GRAM) TOPICAL DAILY
Status: DISCONTINUED | OUTPATIENT
Start: 2025-01-01 | End: 2025-01-01 | Stop reason: HOSPADM

## 2025-01-01 RX ORDER — DEXAMETHASONE SODIUM PHOSPHATE 4 MG/ML
10 INJECTION, SOLUTION INTRA-ARTICULAR; INTRALESIONAL; INTRAMUSCULAR; INTRAVENOUS; SOFT TISSUE ONCE
Status: COMPLETED | OUTPATIENT
Start: 2025-01-01 | End: 2025-01-01

## 2025-01-01 RX ORDER — THIAMINE HCL 100 MG
200 TABLET ORAL DAILY
Status: COMPLETED | OUTPATIENT
Start: 2025-01-01 | End: 2025-01-01

## 2025-01-01 RX ORDER — MORPHINE SULFATE 4 MG/ML
4 INJECTION, SOLUTION INTRAMUSCULAR; INTRAVENOUS EVERY 4 HOURS
Status: DISCONTINUED | OUTPATIENT
Start: 2025-01-01 | End: 2025-02-28 | Stop reason: HOSPADM

## 2025-01-01 RX ORDER — SODIUM CHLORIDE 9 MG/ML
INJECTION, SOLUTION INTRAVENOUS
Status: COMPLETED | OUTPATIENT
Start: 2025-01-01 | End: 2025-01-01

## 2025-01-01 RX ORDER — NALOXONE HCL 0.4 MG/ML
0.02 VIAL (ML) INJECTION
Status: DISCONTINUED | OUTPATIENT
Start: 2025-01-01 | End: 2025-01-01 | Stop reason: HOSPADM

## 2025-01-01 RX ORDER — NALOXONE HCL 0.4 MG/ML
VIAL (ML) INJECTION
Status: DISCONTINUED
Start: 2025-01-01 | End: 2025-01-01 | Stop reason: WASHOUT

## 2025-01-01 RX ORDER — FUROSEMIDE 10 MG/ML
60 INJECTION INTRAMUSCULAR; INTRAVENOUS ONCE
Status: DISCONTINUED | OUTPATIENT
Start: 2025-01-01 | End: 2025-01-01

## 2025-01-01 RX ORDER — GABAPENTIN 300 MG/1
300 CAPSULE ORAL 3 TIMES DAILY
Status: DISCONTINUED | OUTPATIENT
Start: 2025-01-01 | End: 2025-01-01

## 2025-01-01 RX ORDER — IBUPROFEN 200 MG
24 TABLET ORAL
Status: DISCONTINUED | OUTPATIENT
Start: 2025-01-01 | End: 2025-02-28 | Stop reason: HOSPADM

## 2025-01-01 RX ORDER — FOLIC ACID 1 MG/1
1 TABLET ORAL DAILY
Status: DISCONTINUED | OUTPATIENT
Start: 2025-01-01 | End: 2025-01-01 | Stop reason: HOSPADM

## 2025-01-01 RX ORDER — DEXTROSE MONOHYDRATE, SODIUM CHLORIDE, AND POTASSIUM CHLORIDE 50; 1.49; 9 G/1000ML; G/1000ML; G/1000ML
INJECTION, SOLUTION INTRAVENOUS CONTINUOUS
Status: DISCONTINUED | OUTPATIENT
Start: 2025-01-01 | End: 2025-01-01

## 2025-01-01 RX ORDER — OXYCODONE AND ACETAMINOPHEN 10; 325 MG/1; MG/1
1 TABLET ORAL EVERY 4 HOURS PRN
Qty: 25 TABLET | Refills: 0 | Status: SHIPPED | OUTPATIENT
Start: 2025-01-01 | End: 2025-01-01

## 2025-01-01 RX ORDER — BISACODYL 10 MG/1
10 SUPPOSITORY RECTAL DAILY PRN
Status: DISCONTINUED | OUTPATIENT
Start: 2025-01-01 | End: 2025-02-28 | Stop reason: HOSPADM

## 2025-01-01 RX ORDER — SODIUM CHLORIDE, SODIUM LACTATE, POTASSIUM CHLORIDE, CALCIUM CHLORIDE 600; 310; 30; 20 MG/100ML; MG/100ML; MG/100ML; MG/100ML
INJECTION, SOLUTION INTRAVENOUS CONTINUOUS
Status: DISCONTINUED | OUTPATIENT
Start: 2025-01-01 | End: 2025-01-01

## 2025-01-01 RX ORDER — MORPHINE SULFATE 4 MG/ML
4 INJECTION, SOLUTION INTRAMUSCULAR; INTRAVENOUS
Status: COMPLETED | OUTPATIENT
Start: 2025-01-01 | End: 2025-01-01

## 2025-01-01 RX ORDER — CEFTRIAXONE 1 G/1
1 INJECTION, POWDER, FOR SOLUTION INTRAMUSCULAR; INTRAVENOUS
Status: DISCONTINUED | OUTPATIENT
Start: 2025-01-01 | End: 2025-01-01

## 2025-01-01 RX ORDER — IBUPROFEN 200 MG
16 TABLET ORAL
Status: DISCONTINUED | OUTPATIENT
Start: 2025-01-01 | End: 2025-01-01 | Stop reason: HOSPADM

## 2025-01-01 RX ORDER — LIDOCAINE 50 MG/G
1 PATCH TOPICAL
Status: DISCONTINUED | OUTPATIENT
Start: 2025-01-01 | End: 2025-01-01 | Stop reason: HOSPADM

## 2025-01-01 RX ORDER — OXYCODONE AND ACETAMINOPHEN 5; 325 MG/1; MG/1
1 TABLET ORAL EVERY 4 HOURS PRN
Status: DISCONTINUED | OUTPATIENT
Start: 2025-01-01 | End: 2025-01-01

## 2025-01-01 RX ORDER — LORAZEPAM 2 MG/ML
2 INJECTION INTRAMUSCULAR EVERY 4 HOURS PRN
Status: DISCONTINUED | OUTPATIENT
Start: 2025-01-01 | End: 2025-02-28 | Stop reason: HOSPADM

## 2025-01-01 RX ORDER — LEVOTHYROXINE SODIUM 150 UG/1
150 TABLET ORAL DAILY
COMMUNITY
Start: 2025-01-01

## 2025-01-01 RX ORDER — GABAPENTIN 300 MG/1
300 CAPSULE ORAL 3 TIMES DAILY
Status: DISCONTINUED | OUTPATIENT
Start: 2025-01-01 | End: 2025-01-01 | Stop reason: HOSPADM

## 2025-01-01 RX ORDER — SENNOSIDES 8.6 MG/1
8.6 TABLET ORAL 2 TIMES DAILY
Status: DISCONTINUED | OUTPATIENT
Start: 2025-01-01 | End: 2025-01-01 | Stop reason: HOSPADM

## 2025-01-01 RX ORDER — NOREPINEPHRINE BITARTRATE/D5W 4MG/250ML
PLASTIC BAG, INJECTION (ML) INTRAVENOUS
Status: COMPLETED
Start: 2025-01-01 | End: 2025-01-01

## 2025-01-01 RX ORDER — ENOXAPARIN SODIUM 100 MG/ML
40 INJECTION SUBCUTANEOUS EVERY 24 HOURS
Status: DISCONTINUED | OUTPATIENT
Start: 2025-01-01 | End: 2025-01-01

## 2025-01-01 RX ORDER — ONDANSETRON HYDROCHLORIDE 2 MG/ML
8 INJECTION, SOLUTION INTRAVENOUS EVERY 8 HOURS PRN
Status: DISCONTINUED | OUTPATIENT
Start: 2025-01-01 | End: 2025-02-28 | Stop reason: HOSPADM

## 2025-01-01 RX ORDER — MUPIROCIN 20 MG/G
OINTMENT TOPICAL 2 TIMES DAILY
Status: DISCONTINUED | OUTPATIENT
Start: 2025-01-01 | End: 2025-02-28 | Stop reason: HOSPADM

## 2025-01-01 RX ORDER — NALOXONE HCL 0.4 MG/ML
0.02 VIAL (ML) INJECTION
Status: DISCONTINUED | OUTPATIENT
Start: 2025-01-01 | End: 2025-02-28 | Stop reason: HOSPADM

## 2025-01-01 RX ORDER — CYCLOBENZAPRINE HCL 5 MG
5 TABLET ORAL 3 TIMES DAILY PRN
Status: DISCONTINUED | OUTPATIENT
Start: 2025-01-01 | End: 2025-01-01 | Stop reason: HOSPADM

## 2025-01-01 RX ORDER — CALCITONIN SALMON 200 [USP'U]/ML
4 INJECTION, SOLUTION INTRAMUSCULAR; SUBCUTANEOUS ONCE
Status: COMPLETED | OUTPATIENT
Start: 2025-01-01 | End: 2025-01-01

## 2025-01-01 RX ORDER — HEPARIN SODIUM 5000 [USP'U]/ML
5000 INJECTION, SOLUTION INTRAVENOUS; SUBCUTANEOUS EVERY 8 HOURS
Status: DISCONTINUED | OUTPATIENT
Start: 2025-01-01 | End: 2025-01-01

## 2025-01-01 RX ORDER — MORPHINE SULFATE 2 MG/ML
2 INJECTION, SOLUTION INTRAMUSCULAR; INTRAVENOUS EVERY 4 HOURS PRN
Refills: 0 | Status: DISCONTINUED | OUTPATIENT
Start: 2025-01-01 | End: 2025-01-01

## 2025-01-01 RX ORDER — GADOBUTROL 604.72 MG/ML
5 INJECTION INTRAVENOUS
Status: COMPLETED | OUTPATIENT
Start: 2025-01-01 | End: 2025-01-01

## 2025-01-01 RX ORDER — POLYETHYLENE GLYCOL 3350 17 G/17G
17 POWDER, FOR SOLUTION ORAL DAILY
Status: DISCONTINUED | OUTPATIENT
Start: 2025-01-01 | End: 2025-01-01 | Stop reason: HOSPADM

## 2025-01-01 RX ORDER — NOREPINEPHRINE BITARTRATE/D5W 4MG/250ML
0-3 PLASTIC BAG, INJECTION (ML) INTRAVENOUS CONTINUOUS
Status: DISCONTINUED | OUTPATIENT
Start: 2025-01-01 | End: 2025-01-01

## 2025-01-01 RX ORDER — OXYCODONE AND ACETAMINOPHEN 10; 325 MG/1; MG/1
1 TABLET ORAL EVERY 4 HOURS PRN
COMMUNITY
Start: 2025-01-01

## 2025-01-01 RX ADMIN — MORPHINE SULFATE 4 MG: 4 INJECTION INTRAVENOUS at 12:02

## 2025-01-01 RX ADMIN — MAGNESIUM OXIDE TAB 400 MG (241.3 MG ELEMENTAL MG) 400 MG: 400 (241.3 MG) TAB at 08:02

## 2025-01-01 RX ADMIN — FOLIC ACID 1 MG: 1 TABLET ORAL at 09:02

## 2025-01-01 RX ADMIN — OXYCODONE HYDROCHLORIDE AND ACETAMINOPHEN 1 TABLET: 5; 325 TABLET ORAL at 09:02

## 2025-01-01 RX ADMIN — LIDOCAINE 1 PATCH: 50 PATCH CUTANEOUS at 02:02

## 2025-01-01 RX ADMIN — LEVOTHYROXINE SODIUM 150 MCG: 100 TABLET ORAL at 06:02

## 2025-01-01 RX ADMIN — DEXAMETHASONE 2 MG: 1 TABLET ORAL at 09:02

## 2025-01-01 RX ADMIN — IPRATROPIUM BROMIDE AND ALBUTEROL SULFATE 3 ML: 2.5; .5 SOLUTION RESPIRATORY (INHALATION) at 08:02

## 2025-01-01 RX ADMIN — CYANOCOBALAMIN TAB 1000 MCG 1000 MCG: 1000 TAB at 08:02

## 2025-01-01 RX ADMIN — THIAMINE HYDROCHLORIDE 200 MG: 100 INJECTION, SOLUTION INTRAMUSCULAR; INTRAVENOUS at 09:02

## 2025-01-01 RX ADMIN — LIDOCAINE HYDROCHLORIDE 5 ML: 10 INJECTION, SOLUTION INFILTRATION; PERINEURAL at 10:02

## 2025-01-01 RX ADMIN — ACETAMINOPHEN 650 MG: 325 TABLET ORAL at 11:02

## 2025-01-01 RX ADMIN — SODIUM CHLORIDE 500 ML: 9 INJECTION, SOLUTION INTRAVENOUS at 08:02

## 2025-01-01 RX ADMIN — SODIUM PHOSPHATE, MONOBASIC, MONOHYDRATE AND SODIUM PHOSPHATE, DIBASIC, ANHYDROUS 15 MMOL: 142; 276 INJECTION, SOLUTION INTRAVENOUS at 11:02

## 2025-01-01 RX ADMIN — OXYCODONE HYDROCHLORIDE AND ACETAMINOPHEN 1 TABLET: 5; 325 TABLET ORAL at 12:02

## 2025-01-01 RX ADMIN — PROPRANOLOL HYDROCHLORIDE 10 MG: 10 TABLET ORAL at 09:02

## 2025-01-01 RX ADMIN — DEXAMETHASONE 2 MG: 1 TABLET ORAL at 10:02

## 2025-01-01 RX ADMIN — ACETAMINOPHEN 650 MG: 325 TABLET ORAL at 12:02

## 2025-01-01 RX ADMIN — FOLIC ACID 1 MG: 1 TABLET ORAL at 08:02

## 2025-01-01 RX ADMIN — GABAPENTIN 300 MG: 300 CAPSULE ORAL at 02:02

## 2025-01-01 RX ADMIN — DEXAMETHASONE SODIUM PHOSPHATE 6 MG: 4 INJECTION, SOLUTION INTRA-ARTICULAR; INTRALESIONAL; INTRAMUSCULAR; INTRAVENOUS; SOFT TISSUE at 12:02

## 2025-01-01 RX ADMIN — MUPIROCIN: 20 OINTMENT TOPICAL at 08:02

## 2025-01-01 RX ADMIN — FUROSEMIDE 60 MG: 10 INJECTION, SOLUTION INTRAMUSCULAR; INTRAVENOUS at 09:02

## 2025-01-01 RX ADMIN — SODIUM CHLORIDE: 9 INJECTION, SOLUTION INTRAVENOUS at 08:02

## 2025-01-01 RX ADMIN — CYANOCOBALAMIN TAB 1000 MCG 1000 MCG: 1000 TAB at 09:02

## 2025-01-01 RX ADMIN — AZITHROMYCIN MONOHYDRATE 500 MG: 500 INJECTION, POWDER, LYOPHILIZED, FOR SOLUTION INTRAVENOUS at 05:02

## 2025-01-01 RX ADMIN — CEFTRIAXONE SODIUM 1 G: 1 INJECTION, POWDER, FOR SOLUTION INTRAMUSCULAR; INTRAVENOUS at 05:02

## 2025-01-01 RX ADMIN — DEXAMETHASONE SODIUM PHOSPHATE 6 MG: 4 INJECTION, SOLUTION INTRA-ARTICULAR; INTRALESIONAL; INTRAMUSCULAR; INTRAVENOUS; SOFT TISSUE at 05:02

## 2025-01-01 RX ADMIN — GABAPENTIN 300 MG: 300 CAPSULE ORAL at 08:02

## 2025-01-01 RX ADMIN — LEVOTHYROXINE SODIUM 150 MCG: 100 TABLET ORAL at 10:02

## 2025-01-01 RX ADMIN — ACETAMINOPHEN 650 MG: 325 TABLET ORAL at 06:02

## 2025-01-01 RX ADMIN — LORAZEPAM 2 MG: 2 INJECTION INTRAMUSCULAR; INTRAVENOUS at 11:02

## 2025-01-01 RX ADMIN — Medication 200 MG: at 09:02

## 2025-01-01 RX ADMIN — SODIUM CHLORIDE: 9 INJECTION, SOLUTION INTRAVENOUS at 10:02

## 2025-01-01 RX ADMIN — SENNOSIDES 8.6 MG: 8.6 TABLET, FILM COATED ORAL at 08:02

## 2025-01-01 RX ADMIN — GABAPENTIN 300 MG: 300 CAPSULE ORAL at 09:02

## 2025-01-01 RX ADMIN — MIDAZOLAM 0.5 MG: 1 INJECTION INTRAMUSCULAR; INTRAVENOUS at 09:02

## 2025-01-01 RX ADMIN — IPRATROPIUM BROMIDE AND ALBUTEROL SULFATE 3 ML: 2.5; .5 SOLUTION RESPIRATORY (INHALATION) at 03:02

## 2025-01-01 RX ADMIN — GADOBUTROL 5 ML: 604.72 INJECTION INTRAVENOUS at 01:02

## 2025-01-01 RX ADMIN — LEVOTHYROXINE SODIUM 150 MCG: 100 TABLET ORAL at 05:02

## 2025-01-01 RX ADMIN — CALCITONIN SALMON 254 UNITS: 200 INJECTION, SOLUTION INTRAMUSCULAR; SUBCUTANEOUS at 05:02

## 2025-01-01 RX ADMIN — GABAPENTIN 300 MG: 300 CAPSULE ORAL at 03:02

## 2025-01-01 RX ADMIN — IPRATROPIUM BROMIDE AND ALBUTEROL SULFATE 3 ML: 2.5; .5 SOLUTION RESPIRATORY (INHALATION) at 11:02

## 2025-01-01 RX ADMIN — CYCLOBENZAPRINE HYDROCHLORIDE 5 MG: 5 TABLET, FILM COATED ORAL at 12:02

## 2025-01-01 RX ADMIN — MORPHINE SULFATE 4 MG: 4 INJECTION INTRAVENOUS at 01:02

## 2025-01-01 RX ADMIN — POLYETHYLENE GLYCOL 3350 17 G: 17 POWDER, FOR SOLUTION ORAL at 09:02

## 2025-01-01 RX ADMIN — PROPRANOLOL HYDROCHLORIDE 10 MG: 10 TABLET ORAL at 08:02

## 2025-01-01 RX ADMIN — OXYCODONE HYDROCHLORIDE AND ACETAMINOPHEN 1 TABLET: 5; 325 TABLET ORAL at 08:02

## 2025-01-01 RX ADMIN — IPRATROPIUM BROMIDE AND ALBUTEROL SULFATE 3 ML: 2.5; .5 SOLUTION RESPIRATORY (INHALATION) at 07:02

## 2025-01-01 RX ADMIN — ENOXAPARIN SODIUM 30 MG: 30 INJECTION SUBCUTANEOUS at 05:02

## 2025-01-01 RX ADMIN — SODIUM CHLORIDE, POTASSIUM CHLORIDE, SODIUM LACTATE AND CALCIUM CHLORIDE 1539 ML: 600; 310; 30; 20 INJECTION, SOLUTION INTRAVENOUS at 01:02

## 2025-01-01 RX ADMIN — SENNOSIDES 8.6 MG: 8.6 TABLET, FILM COATED ORAL at 09:02

## 2025-01-01 RX ADMIN — POTASSIUM CHLORIDE 40 MEQ: 1500 TABLET, EXTENDED RELEASE ORAL at 10:02

## 2025-01-01 RX ADMIN — IOHEXOL 75 ML: 350 INJECTION, SOLUTION INTRAVENOUS at 12:02

## 2025-01-01 RX ADMIN — NICOTINE 1 PATCH: 21 PATCH, EXTENDED RELEASE TRANSDERMAL at 09:02

## 2025-01-01 RX ADMIN — NOREPINEPHRINE BITARTRATE 1.3 MCG/KG/MIN: 1 INJECTION, SOLUTION, CONCENTRATE INTRAVENOUS at 07:02

## 2025-01-01 RX ADMIN — GADOBUTROL 6 ML: 604.72 INJECTION INTRAVENOUS at 08:02

## 2025-01-01 RX ADMIN — FUROSEMIDE 40 MG: 10 INJECTION, SOLUTION INTRAVENOUS at 08:02

## 2025-01-01 RX ADMIN — VASOPRESSIN 0.04 UNITS/MIN: 20 INJECTION INTRAVENOUS at 05:02

## 2025-01-01 RX ADMIN — PROPRANOLOL HYDROCHLORIDE 10 MG: 10 TABLET ORAL at 10:02

## 2025-01-01 RX ADMIN — ENOXAPARIN SODIUM 30 MG: 30 INJECTION SUBCUTANEOUS at 04:02

## 2025-01-01 RX ADMIN — DEXAMETHASONE 2 MG: 1 TABLET ORAL at 08:02

## 2025-01-01 RX ADMIN — FOLIC ACID 1 MG: 5 INJECTION, SOLUTION INTRAMUSCULAR; INTRAVENOUS; SUBCUTANEOUS at 11:02

## 2025-01-01 RX ADMIN — ACETAMINOPHEN 650 MG: 325 TABLET ORAL at 05:02

## 2025-01-01 RX ADMIN — VANCOMYCIN HYDROCHLORIDE 1250 MG: 1.25 INJECTION, POWDER, LYOPHILIZED, FOR SOLUTION INTRAVENOUS at 04:02

## 2025-01-01 RX ADMIN — POLYETHYLENE GLYCOL 3350 17 G: 17 POWDER, FOR SOLUTION ORAL at 08:02

## 2025-01-01 RX ADMIN — PHENYLEPHRINE HYDROCHLORIDE 0.5 MCG/KG/MIN: 10 INJECTION INTRAVENOUS at 09:02

## 2025-01-01 RX ADMIN — OXYCODONE HYDROCHLORIDE AND ACETAMINOPHEN 500 MG: 500 TABLET ORAL at 09:02

## 2025-01-01 RX ADMIN — OXYCODONE HYDROCHLORIDE AND ACETAMINOPHEN 1 TABLET: 5; 325 TABLET ORAL at 10:02

## 2025-01-01 RX ADMIN — SODIUM CHLORIDE 1000 ML: 9 INJECTION, SOLUTION INTRAVENOUS at 05:02

## 2025-01-01 RX ADMIN — OXYCODONE HYDROCHLORIDE AND ACETAMINOPHEN 500 MG: 500 TABLET ORAL at 08:02

## 2025-01-01 RX ADMIN — MAGNESIUM OXIDE TAB 400 MG (241.3 MG ELEMENTAL MG) 400 MG: 400 (241.3 MG) TAB at 09:02

## 2025-01-01 RX ADMIN — MORPHINE SULFATE 4 MG: 4 INJECTION INTRAVENOUS at 09:02

## 2025-01-01 RX ADMIN — FUROSEMIDE 40 MG: 10 INJECTION, SOLUTION INTRAVENOUS at 09:02

## 2025-01-01 RX ADMIN — NOREPINEPHRINE BITARTRATE 0.2 MCG/KG/MIN: 4 INJECTION, SOLUTION INTRAVENOUS at 03:02

## 2025-01-01 RX ADMIN — KETOROLAC TROMETHAMINE 30 MG: 30 INJECTION, SOLUTION INTRAMUSCULAR; INTRAVENOUS at 02:02

## 2025-01-01 RX ADMIN — CALCITONIN SALMON 180 UNITS: 200 INJECTION, SOLUTION INTRAMUSCULAR; SUBCUTANEOUS at 06:02

## 2025-01-01 RX ADMIN — ACETAMINOPHEN 325 MG: 325 TABLET ORAL at 11:02

## 2025-01-01 RX ADMIN — OXYCODONE HYDROCHLORIDE AND ACETAMINOPHEN 1 TABLET: 5; 325 TABLET ORAL at 02:02

## 2025-01-01 RX ADMIN — DEXAMETHASONE SODIUM PHOSPHATE 8 MG: 4 INJECTION, SOLUTION INTRA-ARTICULAR; INTRALESIONAL; INTRAMUSCULAR; INTRAVENOUS; SOFT TISSUE at 02:02

## 2025-01-01 RX ADMIN — NALOXONE HYDROCHLORIDE 1 MG: 1 INJECTION PARENTERAL at 01:02

## 2025-01-01 RX ADMIN — CYANOCOBALAMIN TAB 1000 MCG 1000 MCG: 1000 TAB at 10:02

## 2025-01-01 RX ADMIN — ENOXAPARIN SODIUM 40 MG: 40 INJECTION SUBCUTANEOUS at 05:02

## 2025-01-01 RX ADMIN — THIAMINE HYDROCHLORIDE 200 MG: 100 INJECTION, SOLUTION INTRAMUSCULAR; INTRAVENOUS at 10:02

## 2025-01-01 RX ADMIN — NOREPINEPHRINE BITARTRATE 1.5 MCG/KG/MIN: 4 INJECTION, SOLUTION INTRAVENOUS at 01:02

## 2025-01-01 RX ADMIN — FOLIC ACID 1 MG: 5 INJECTION, SOLUTION INTRAMUSCULAR; INTRAVENOUS; SUBCUTANEOUS at 09:02

## 2025-01-01 RX ADMIN — ACETAMINOPHEN 650 MG: 325 TABLET ORAL at 10:02

## 2025-01-01 RX ADMIN — NOREPINEPHRINE BITARTRATE 1.32 MCG/KG/MIN: 4 INJECTION, SOLUTION INTRAVENOUS at 04:02

## 2025-01-01 RX ADMIN — IPRATROPIUM BROMIDE AND ALBUTEROL SULFATE 3 ML: 2.5; .5 SOLUTION RESPIRATORY (INHALATION) at 01:02

## 2025-01-01 RX ADMIN — SODIUM CHLORIDE 250 ML/HR: 0.9 INJECTION, SOLUTION INTRAVENOUS at 09:02

## 2025-01-01 RX ADMIN — ENOXAPARIN SODIUM 40 MG: 40 INJECTION SUBCUTANEOUS at 08:02

## 2025-01-01 RX ADMIN — CYCLOBENZAPRINE HYDROCHLORIDE 5 MG: 5 TABLET, FILM COATED ORAL at 09:02

## 2025-01-01 RX ADMIN — CYCLOBENZAPRINE HYDROCHLORIDE 5 MG: 5 TABLET, FILM COATED ORAL at 02:02

## 2025-01-01 RX ADMIN — NICOTINE 1 PATCH: 21 PATCH, EXTENDED RELEASE TRANSDERMAL at 08:02

## 2025-01-01 RX ADMIN — FENTANYL CITRATE 75 MCG: 50 INJECTION, SOLUTION INTRAMUSCULAR; INTRAVENOUS at 09:02

## 2025-01-01 RX ADMIN — SODIUM CHLORIDE: 9 INJECTION, SOLUTION INTRAVENOUS at 05:02

## 2025-01-01 RX ADMIN — DEXAMETHASONE SODIUM PHOSPHATE 10 MG: 4 INJECTION, SOLUTION INTRA-ARTICULAR; INTRALESIONAL; INTRAMUSCULAR; INTRAVENOUS; SOFT TISSUE at 11:02

## 2025-01-01 RX ADMIN — AZITHROMYCIN MONOHYDRATE 500 MG: 500 INJECTION, POWDER, LYOPHILIZED, FOR SOLUTION INTRAVENOUS at 10:02

## 2025-01-01 RX ADMIN — ONDANSETRON 4 MG: 2 INJECTION INTRAMUSCULAR; INTRAVENOUS at 09:02

## 2025-01-01 RX ADMIN — DEXTROSE AND SODIUM CHLORIDE: 5; 900 INJECTION, SOLUTION INTRAVENOUS at 08:02

## 2025-01-01 RX ADMIN — OXYCODONE HYDROCHLORIDE AND ACETAMINOPHEN 1 TABLET: 5; 325 TABLET ORAL at 04:02

## 2025-01-01 RX ADMIN — PIPERACILLIN SODIUM AND TAZOBACTAM SODIUM 4.5 G: 4; .5 INJECTION, POWDER, LYOPHILIZED, FOR SOLUTION INTRAVENOUS at 01:02

## 2025-01-01 RX ADMIN — CYCLOBENZAPRINE HYDROCHLORIDE 5 MG: 5 TABLET, FILM COATED ORAL at 08:02

## 2025-01-01 RX ADMIN — SENNOSIDES 8.6 MG: 8.6 TABLET, FILM COATED ORAL at 11:02

## 2025-01-01 RX ADMIN — LIDOCAINE 1 PATCH: 50 PATCH CUTANEOUS at 05:02

## 2025-01-01 RX ADMIN — NICOTINE 1 PATCH: 21 PATCH, EXTENDED RELEASE TRANSDERMAL at 10:02

## 2025-01-01 RX ADMIN — NOREPINEPHRINE BITARTRATE 1.5 MCG/KG/MIN: 4 INJECTION, SOLUTION INTRAVENOUS at 05:02

## 2025-01-01 RX ADMIN — POTASSIUM CHLORIDE: 2 INJECTION, SOLUTION, CONCENTRATE INTRAVENOUS at 11:02

## 2025-01-01 RX ADMIN — OXYCODONE HYDROCHLORIDE AND ACETAMINOPHEN 500 MG: 500 TABLET ORAL at 10:02

## 2025-01-01 RX ADMIN — MORPHINE SULFATE 4 MG: 4 INJECTION INTRAVENOUS at 04:02

## 2025-01-01 RX ADMIN — IOHEXOL 30 ML: 350 INJECTION, SOLUTION INTRAVENOUS at 10:02

## 2025-01-01 RX ADMIN — POTASSIUM CHLORIDE 10 MEQ: 7.46 INJECTION, SOLUTION INTRAVENOUS at 07:02

## 2025-01-01 RX ADMIN — ACETAMINOPHEN 650 MG: 325 TABLET ORAL at 01:02

## 2025-01-01 RX ADMIN — MAGNESIUM OXIDE TAB 400 MG (241.3 MG ELEMENTAL MG) 400 MG: 400 (241.3 MG) TAB at 10:02

## 2025-01-01 RX ADMIN — LIDOCAINE 1 PATCH: 50 PATCH CUTANEOUS at 03:02

## 2025-01-01 RX ADMIN — ACETAMINOPHEN 650 MG: 325 TABLET ORAL at 04:02

## 2025-01-01 RX ADMIN — MORPHINE SULFATE 2 MG: 2 INJECTION, SOLUTION INTRAMUSCULAR; INTRAVENOUS at 04:02

## 2025-01-01 RX ADMIN — PIPERACILLIN SODIUM AND TAZOBACTAM SODIUM 4.5 G: 4; .5 INJECTION, POWDER, LYOPHILIZED, FOR SOLUTION INTRAVENOUS at 05:02

## 2025-01-01 RX ADMIN — IPRATROPIUM BROMIDE AND ALBUTEROL SULFATE 3 ML: 2.5; .5 SOLUTION RESPIRATORY (INHALATION) at 12:02

## 2025-01-01 RX ADMIN — THIAMINE HYDROCHLORIDE 400 MG: 100 INJECTION, SOLUTION INTRAMUSCULAR; INTRAVENOUS at 09:02

## 2025-01-01 RX ADMIN — CALCITONIN SALMON 180 UNITS: 200 INJECTION, SOLUTION INTRAMUSCULAR; SUBCUTANEOUS at 08:02

## 2025-01-01 RX ADMIN — LEVOTHYROXINE SODIUM 100 MCG: 20 INJECTION, SOLUTION INTRAVENOUS at 09:02

## 2025-01-01 RX ADMIN — NOREPINEPHRINE BITARTRATE 1.4 MCG/KG/MIN: 4 INJECTION, SOLUTION INTRAVENOUS at 06:02

## 2025-01-01 RX ADMIN — POTASSIUM CHLORIDE 10 MEQ: 7.46 INJECTION, SOLUTION INTRAVENOUS at 06:02

## 2025-01-01 RX ADMIN — ZOLEDRONIC ACID 3 MG: 4 INJECTION, SOLUTION, CONCENTRATE INTRAVENOUS at 06:02

## 2025-01-01 RX ADMIN — IPRATROPIUM BROMIDE AND ALBUTEROL SULFATE 3 ML: 2.5; .5 SOLUTION RESPIRATORY (INHALATION) at 04:02

## 2025-01-01 RX ADMIN — FUROSEMIDE 40 MG: 10 INJECTION, SOLUTION INTRAVENOUS at 10:02

## 2025-01-01 RX ADMIN — MUPIROCIN: 20 OINTMENT TOPICAL at 09:02

## 2025-01-01 RX ADMIN — METHOCARBAMOL 500 MG: 500 TABLET ORAL at 02:02

## 2025-01-01 RX ADMIN — CYCLOBENZAPRINE HYDROCHLORIDE 5 MG: 5 TABLET, FILM COATED ORAL at 10:02

## 2025-01-01 RX ADMIN — POTASSIUM CHLORIDE 10 MEQ: 7.46 INJECTION, SOLUTION INTRAVENOUS at 05:02

## 2025-01-01 RX ADMIN — PIPERACILLIN SODIUM AND TAZOBACTAM SODIUM 4.5 G: 4; .5 INJECTION, POWDER, LYOPHILIZED, FOR SOLUTION INTRAVENOUS at 09:02

## 2025-01-01 RX ADMIN — LEVOTHYROXINE SODIUM 100 MCG: 20 INJECTION, SOLUTION INTRAVENOUS at 12:02

## 2025-01-01 RX ADMIN — POLYETHYLENE GLYCOL 3350 17 G: 17 POWDER, FOR SOLUTION ORAL at 01:02

## 2025-01-01 RX ADMIN — POTASSIUM CHLORIDE 10 MEQ: 7.46 INJECTION, SOLUTION INTRAVENOUS at 08:02

## 2025-01-01 RX ADMIN — GABAPENTIN 300 MG: 300 CAPSULE ORAL at 10:02

## 2025-01-01 RX ADMIN — POTASSIUM CHLORIDE 40 MEQ: 1500 TABLET, EXTENDED RELEASE ORAL at 08:02

## 2025-01-01 RX ADMIN — NOREPINEPHRINE BITARTRATE 1 MCG/KG/MIN: 4 INJECTION, SOLUTION INTRAVENOUS at 01:02

## 2025-01-01 RX ADMIN — MORPHINE SULFATE 4 MG: 4 INJECTION INTRAVENOUS at 02:02

## 2025-01-01 RX ADMIN — PIPERACILLIN SODIUM AND TAZOBACTAM SODIUM 4.5 G: 4; .5 INJECTION, POWDER, LYOPHILIZED, FOR SOLUTION INTRAVENOUS at 02:02

## 2025-01-01 RX ADMIN — FOLIC ACID 1 MG: 1 TABLET ORAL at 10:02

## 2025-01-01 RX ADMIN — MORPHINE SULFATE 2 MG: 2 INJECTION, SOLUTION INTRAMUSCULAR; INTRAVENOUS at 10:02

## 2025-02-04 NOTE — PHARMACY MED REC
"    [facility]           Pharmacy  Admission Medication History     The home medication history was taken by Lilliam Sutton.      Medication history obtained from Medications listed below were obtained from: Patient/family and Medications brought from home.    Based on information gathered for medication list, you may go to "Admission" then "Reconcile Home Medications" tabs to review and/or act upon those items.     The home medication list has been updated by the Pharmacy department.   Please read ALL comments highlighted in yellow.   Please address this information as you see fit.    Feel free to contact us if you have any questions or require assistance.    The medications listed below were removed from the home medication list.  Please reorder if appropriate:    Patient reports NOT TAKING the following medication(s):  Fioricet -40 mg tab  Naproxen 500 mg tab  Sumatriptan 25 mg tab    No current facility-administered medications on file prior to encounter.     Current Outpatient Medications on File Prior to Encounter   Medication Sig Dispense Refill    amLODIPine (NORVASC) 10 MG tablet Take 10 mg by mouth once daily.      ascorbic acid, vitamin C, (VITAMIN C) 500 MG tablet Take 500 mg by mouth once daily.      gabapentin (NEURONTIN) 300 MG capsule Take 300 mg by mouth 2 (two) times daily.      levothyroxine (SYNTHROID) 150 MCG tablet Take 150 mcg by mouth once daily.      lisinopriL-hydrochlorothiazide (PRINZIDE,ZESTORETIC) 20-25 mg Tab Take 1 tablet by mouth once daily.      propranolol (INDERAL) 10 MG tablet Take 10 mg by mouth 2 (two) times daily.         Please address this information as you see fit.  Feel free to contact us if you have any questions or require assistance.    Lilliam Sutton  [phone]              .          "

## 2025-02-04 NOTE — FIRST PROVIDER EVALUATION
Emergency Department TeleTriage Encounter Note      CHIEF COMPLAINT    Chief Complaint   Patient presents with    Back Pain     Chronic lower back pain radiating to right leg x 6 months. No h/o injury. Taking Gabapentin without relief. Presents awake, alert. No distress.        VITAL SIGNS   Initial Vitals [02/04/25 1155]   BP Pulse Resp Temp SpO2   (!) 165/100 109 18 98 °F (36.7 °C) (!) 94 %      MAP       --            ALLERGIES    Review of patient's allergies indicates:  No Known Allergies    PROVIDER TRIAGE NOTE  Back and leg pain for greater than six months. States pain is getting worse with taking ibuprofen and gabapentin. Denies saddle anesthesia, bowel/bladder incontinence. Unsure if she took her gabapentin today.    Limited physical exam via telehealth: The patient is awake, alert, answering questions appropriately and is not in respiratory distress.  As the Teletriage provider, I performed an initial assessment and ordered appropriate labs and imaging studies, if any, to facilitate the patient's care once placed in the ED. Once a room is available, care and a full evaluation will be completed by an alternate ED provider.  Any additional orders and the final disposition will be determined by that provider.  All imaging and labs will not be followed-up by the Teletriage Team, including myself.          ORDERS  Labs Reviewed - No data to display    ED Orders (720h ago, onward)      Start Ordered     Status Ordering Provider    02/04/25 1222 02/04/25 1221  X-Ray Lumbar Spine Ap And Lateral  1 time imaging         Ordered SUGAR MARTINS              Virtual Visit Note: The provider triage portion of this emergency department evaluation and documentation was performed via [employer], a HIPAA-compliant telemedicine application, in concert with a tele-presenter in the room. A face to face patient evaluation with one of my colleagues will occur once the patient is placed in an emergency department  room.      DISCLAIMER: This note was prepared with [employer] voice recognition transcription software. Garbled syntax, mangled pronouns, and other bizarre constructions may be attributed to that software system.

## 2025-02-04 NOTE — H&P
History and Physical  Hospital Medicine       Patient Name: Nora Torres  MRN:  [id number]  Hospital Medicine Team: Networked reference to record PCT  Yulia Wolf MD  Date of Admission:  2/4/2025     Principal Problem:  <principal problem not specified>   Primary Care Physician: Bobbi Booker MD ([facility])      History of Present Illness:     Ms. Nora Torres is a 64 y.o. 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  The patient was previously admitted to the ED on October 20, 2024, for back pain, at which time an MRI revealed innumerable sclerotic bone lesions, a mild pathologic compression fracture of L5, and mild neural foraminal stenosis at L4/L5 and L5-S1. She was advised to follow up with her PCP, which she did; however, she did not disclose the findings of metastatic cancer.  Since then, the patient reports progressively worsening pain radiating to the right leg. She also notes increasing leg weakness and is unable to walk more than a few steps with a cane. Additionally, she reports a decreased appetite, a 30-pound unintentional weight loss, and reduced oral intake. However, she denies any loss of bowel or bladder control.    In the ED, patient was hypertensive 165/100, tachycardic to 100 , EKGs showed sinus tachycardia.  CT lumbar spine was done which showed innumerable lytic lesions throughout the lumbar spine and pelvis with pathological fracture of L4-L5 .  Labs were significant for hypokalemia 3.3, mildly elevated lactate 2.5, bicarb 22, elevated TSH 40 with low free T4 0.49, hypercalcemia 12.9.  Patient was given dexamethasone, morphine    Review of Systems   Negative except mentioned in the ED       Past Medical History: Patient has a past medical history of Hypertension and Thyroid disease.    Past Surgical History: Patient  has a past surgical history that includes  section.    Social History: Patient reports that she has been smoking cigarettes. She does not have any smokeless tobacco history on file. She reports that she does not currently use drugs. She reports that she does not drink alcohol.    Family History: family history is not on file.    Medications: Scheduled Meds:   LIDOcaine  1 patch Transdermal Q24H     Continuous Infusions:   0.9% NaCl   Intravenous Continuous 200 mL/hr at 25 1720 New Bag at 25 1720     PRN Meds:.    Allergies: Patient has No Known Allergies.    Physical Exam:     Vital Signs (Most Recent):  Temp: 98 °F (36.7 °C) (25 1155)  Pulse: 109 (25 1413)  Resp: 18 (25 1642)  BP: (!) 146/87 (25 1413)  SpO2: 95 % (25 1413) Vital Signs Range (Last 24H):  Temp:  [98 °F (36.7 °C)]   Pulse:  [109]   Resp:  [18-20]   BP: (146-165)/()   SpO2:  [94 %-95 %]    Body mass index is 24.03 kg/m².     Physical Exam:  Constitutional: malnourished.   Head: Normocephalic and atraumatic.   Mouth/Throat: Oropharynx is clear and moist.   Eyes: EOM are normal. Pupils are equal, round, and reactive to light. No scleral icterus.   Neck: Normal range of motion. Neck supple.   Cardiovascular: Normal rate and regular rhythm.  No murmur heard.  Pulmonary/Chest: Effort normal and breath sounds normal. No respiratory distress. No wheezes, rales, or rhonchi  Abdominal: Soft. Bowel sounds are normal.  No distension or tenderness  Musculoskeletal: Normal range of motion. No edema.   Neurological: Alert and oriented to person, place, and time. weakness in both legs R>L and decrease sensation R>L  Skin: Skin is warm and dry.   Psychiatric: Normal mood and affect. Behavior is normal.   Vitals reviewed.    Recent Labs   Lab 25  1632   WBC 9.95   HGB 14.3   HCT 42.5          Recent Labs   Lab 25  1632 25  1641     --    K 3.3*  --      --    CO2 22*  --    BUN  "14  --    CREATININE 0.8  --    *  --    CALCIUM 12.9*  --    MG  --  2.2     Recent Labs   Lab 02/04/25  1632   ALKPHOS 147   ALT 8*   AST 51*   ALBUMIN 3.2*   PROT 7.2   BILITOT 0.6      No results for input(s): "POCTGLUCOSE" in the last 168 hours.      Assessment and Plan:     Ms. Nora Torres is a 64 y.o. female with history of hypertension, hypothyroidism, metastatic disease to the spine with unknown primary, L5 compression fracture who presented to the ED with chief complaint of back pain.  Patient has multiple admissions to the ED for back pain most recently October 20, 2024 where she had an MRI and she was found to have innumerable anesthetic bone lesions to bone with mild pathologic compression fracture of L5 and mild neural foramina stenosis L4/L5 and L5-S1 with likely breast cancer is the primary given left breast mass.      Metastatic cancer to the bone with likely breast cancer primary given large L breast mass  Compression fracture of L5  Cancer pain  -MRI spine October 20, 2024 with innumerable anesthetic bone lesions to bone with mild pathologic compression fracture of L5 and mild neural foramina stenosis L4/L5 and L5-S1.  -CT lumbar spine was done which showed innumerable lytic lesions throughout the lumbar spine and pelvis with pathological fracture of L4-L5  -received dexamethasone in the ED  -we will order MRI spine and CT chest abdomen and pelvis to look for primary.  -multimodality pain with scheduled Tylenol 650 q.6 hours, magnesium 400 mg b.i.d., gabapentin 300 mg b.i.d, Flexeril p.r.n.  Morphine p.r.n. for severe pain  -consult to Oncology for further evaluation  -Spoke to [facility]surgery at [facility] given numerous lesions with concerning physical exam including weakness in both legs R>L and decrease sensation R>L, recommended decadron 10 mg followed by 6 mg q6 for the first 24 hours. Also recommend evaluation by radiation oncology for palliative radiation treatment. Consult placed "   -will also order CTH      Hypercalcemia likely secondary to the cancer  Lactic acid  S/p 1 dose of calcitonin  Normal saline rate to 100 mL/hour to be adjusted with goal to many urine output 100-150 mL per hour    Hypertension:  Hold home antihypertensive medication including amlodipine and lisinopril/hydrochlorothiazide to avoid hypotension.  Continue propranolol 10 mg b.i.d.    Hypothyroidism:  Patient on levothyroxine 150 but has not been taking it recently.  TSH elevated. Will put IV levothyroxine 150 for now.     Malnutrition:   Hypokalemia   Nausea   thiamine, B12, folic acid, nutrition consult   Antiemetic medications PRN    Diet:  Regular diet  DVT PPx:  Lovenox 40 mg daily    Disposition:  LAVON Wolf MD  [facility]

## 2025-02-04 NOTE — ED NOTES
Patient reports chronic lower back pain x 6 months that radiates to right leg. She denies any injury to back and reports increased pain over the past few days. Patient reports pain 9/10 and describes as aching, throbbing, and sharp. Patient denies any nausea, vomiting, chest pain, or shortness of breath.

## 2025-02-04 NOTE — ED PROVIDER NOTES
"Encounter Date: 2025       History     Chief Complaint   Patient presents with    Back Pain     Chronic lower back pain radiating to right leg x 6 months. No h/o injury. Taking Gabapentin without relief. Presents awake, alert. No distress.      Patient is a 64-year-old female with a past medical history of hypertension and thyroid disease who presents to emergency room for right lower back pain radiates down her right leg.  Patient states that this is been ongoing for months.  She was seen past for this.  However, pain has been worsening in severity and "she can not take it anymore.  She does have intermittent numbness to that leg.  None at this time.  Difficulty with moving a leg and ambulating due to pain.  Per chart review, patient was seen in the past for this.  Had MRI obtained and at that time which showed that she had metastatic cancer to her back.  Was discharged and told to follow up.  Patient states "I did not follow up because I was scared." She has been taking gabapentin for her pain without relief.  No urinary incontinence, saddle anesthesia, urinary retention, bowel incontinence, weakness, or others at this time.    The history is provided by the patient. No  was used.     Review of patient's allergies indicates:  No Known Allergies  Past Medical History:   Diagnosis Date    Hypertension     Thyroid disease     hypothyroid     Past Surgical History:   Procedure Laterality Date     SECTION      x2     No family history on file.  Social History     Tobacco Use    Smoking status: Every Day     Types: Cigarettes   Substance Use Topics    Alcohol use: No    Drug use: Not Currently     Review of Systems   Constitutional:  Negative for chills, diaphoresis, fatigue and fever.   HENT:  Negative for congestion, sore throat and trouble swallowing.    Respiratory:  Negative for cough and shortness of breath.    Cardiovascular:  Negative for chest pain and palpitations. "   Gastrointestinal:  Negative for abdominal pain, blood in stool, constipation, diarrhea, nausea and vomiting.   Genitourinary:  Negative for difficulty urinating, dysuria, frequency and urgency.   Musculoskeletal:  Positive for back pain (right lower). Negative for myalgias.   Skin:  Negative for rash and wound.   Neurological:  Positive for numbness (intermittent to right lower extremity). Negative for weakness, light-headedness and headaches.       Physical Exam     Initial Vitals [02/04/25 1155]   BP Pulse Resp Temp SpO2   (!) 165/100 109 18 98 °F (36.7 °C) (!) 94 %      MAP       --         Physical Exam    Nursing note and vitals reviewed.  Constitutional: She is not diaphoretic. She appears distressed.   Patient is frail appearing.  Sitting in wheelchair.  Appears uncomfortable due to pain.  Awake and alert.  Speaking in complete sentences.  Maintaining airway appropriately.   HENT:   Head: Normocephalic and atraumatic.   Right Ear: External ear normal.   Left Ear: External ear normal.   Eyes: Conjunctivae and EOM are normal.   Neck: Neck supple.   Normal range of motion.  Pulmonary/Chest: No respiratory distress.   Musculoskeletal:      Cervical back: Normal, normal range of motion and neck supple.      Thoracic back: Normal.      Lumbar back: Tenderness present. No bony tenderness. Decreased range of motion. Positive right straight leg raise test. Negative left straight leg raise test.        Back:         Legs:       Comments: Patient with decreased range of motion of right lower extremity due to pain.     Neurological: She is alert and oriented to person, place, and time. No sensory deficit.   Sensation intact throughout bilateral lower extremities.   Skin: Skin is warm.   Psychiatric: She has a normal mood and affect. Her behavior is normal. Thought content normal.         ED Course   Procedures  Labs Reviewed   CBC W/ AUTO DIFFERENTIAL - Abnormal       Result Value    WBC 9.95      RBC 5.06       Hemoglobin 14.3      Hematocrit 42.5      MCV 84      MCH 28.3      MCHC 33.6      RDW 17.5 (*)     Platelets 409      MPV 8.7 (*)     Immature Granulocytes 0.6 (*)     Gran # (ANC) 8.3 (*)     Immature Grans (Abs) 0.06 (*)     Lymph # 1.2      Mono # 0.3      Eos # 0.0      Baso # 0.04      nRBC 0      Gran % 83.8 (*)     Lymph % 12.0 (*)     Mono % 3.2 (*)     Eosinophil % 0.0      Basophil % 0.4      Differential Method Automated     COMPREHENSIVE METABOLIC PANEL - Abnormal    Sodium 139      Potassium 3.3 (*)     Chloride 104      CO2 22 (*)     Glucose 117 (*)     BUN 14      Creatinine 0.8      Calcium 12.9 (*)     Total Protein 7.2      Albumin 3.2 (*)     Total Bilirubin 0.6      Alkaline Phosphatase 147      AST 51 (*)     ALT 8 (*)     eGFR >60      Anion Gap 13      Narrative:       Calcium critical result(s) called and verbal readback obtained from   Stephanie Rees RN. by [facility] 02/04/2025 17:03   TSH - Abnormal    TSH 40.635 (*)    URINALYSIS, REFLEX TO URINE CULTURE - Abnormal    Specimen UA Urine, Clean Catch      Color, UA Yellow      Appearance, UA Hazy (*)     pH, UA 6.0      Specific Gravity, UA 1.020      Protein, UA Trace (*)     Glucose, UA Negative      Ketones, UA 1+ (*)     Bilirubin (UA) Negative      Occult Blood UA Trace (*)     Nitrite, UA Positive (*)     Urobilinogen, UA 2.0-3.0 (*)     Leukocytes, UA 1+ (*)     Narrative:     Specimen Source->Urine   LACTIC ACID, PLASMA - Abnormal    Lactate (Lactic Acid) 2.5 (*)    URINALYSIS MICROSCOPIC - Abnormal    RBC, UA 0      WBC, UA 0      Bacteria Moderate (*)     Microscopic Comment SEE COMMENT      Narrative:     Specimen Source->Urine   T4, FREE - Abnormal    Free T4 0.49 (*)    MAGNESIUM    Magnesium 2.2     LACTIC ACID, PLASMA   IRON AND TIBC   IMMUNOFIXATION ELECTROPHORESIS, SERUM   IMMUNOFIXATION, URINE  RANDOM   LACTATE DEHYDROGENASE   BETA 2 MICROGLOBULIN, SERUM   COMPREHENSIVE METABOLIC PANEL   LACTIC ACID, PLASMA          Imaging  Results              MRI Spine Cervical-Thoracic-Lumbar W W/O Contrast (XPD) (In process)                       CT Lumbar Spine Without Contrast (Final result)  Result time 02/04/25 15:23:49      Final result by Baron Jackson MD (02/04/25 15:23:49)                   Impression:      Innumerable lytic lesions throughout the lumbar spine and pelvis, in keeping with metastatic disease or multiple myeloma.  Associated pathologic fractures at L4 and L5.  Multifocal extra osseous extension of disease with spinal canal and neural foraminal encroachment, which would be better delineated with MRI.    Partially visualized 5.6 cm partially calcified mass lesion in the right adnexal region.    Bilateral pleural effusions.    This report was flagged in Epic as abnormal.      Electronically signed by: Baron Jackson MD  Date:    02/04/2025  Time:    15:23               Narrative:    EXAMINATION:  CT LUMBAR SPINE WITHOUT CONTRAST    CLINICAL HISTORY:  Low back pain, progressive neurologic deficit;    TECHNIQUE:  Low-dose CT images obtained throughout the region of the lumbar spine.  Axial, sagittal and coronal reformations were performed.  Contrast was not administered.    COMPARISON:  Radiograph 02/04/2025.    FINDINGS:  There are innumerable lytic lesions, which affect virtually every vertebral level.  There is replacement of the majority of the L3, L4 and L5 vertebral bodies as well as the posterior elements.  Large lytic masses involving also involve pelvis, particularly the left iliac bone, both sacral ala, in the right acetabulum.  There is evidence of extra osseous extension of disease at multiple sites.  Associated pathologic fractures with mild vertebral height loss at L4 and L5.    Normal sagittal alignment.  No significant spondylolisthesis.    The intervertebral disc heights are fairly well maintained.    No bony spinal canal stenosis but there is spinal canal encroachment from extra osseous extension of disease  at a few levels.  There is also multilevel neural foraminal encroachment.    No intraspinal hematoma.    Bilateral pleural effusions.  5.6 cm heterogeneous partially calcified lesion in the right adnexal region.    Punctate nonobstructing left renal calculus.                                       X-Ray Lumbar Spine Ap And Lateral (Final result)  Result time 02/04/25 13:36:07      Final result by Nawaf Del Angel MD (02/04/25 13:36:07)                   Impression:      Relative to prior radiograph performed 09/16/2024, there is a new moderate compression fracture at L4.  Recommend clinical correlation.  MRI would better characterize.      Electronically signed by: Nawaf Del Angel  Date:    02/04/2025  Time:    13:36               Narrative:    EXAMINATION:  XR LUMBAR SPINE AP AND LATERAL    CLINICAL HISTORY:  back pain;    TECHNIQUE:  AP, lateral and spot images were performed of the lumbar spine.    COMPARISON:  Lumbar spine radiograph performed 09/16/2024.    FINDINGS:  Five non-rib-bearing lumbar-type vertebra.    Relative to prior radiograph performed 09/16/2024, there is a new moderate compression fracture at L4.    Relatively similar mild height loss of the L5 vertebra.    Multilevel degenerative endplate and facet sclerosis.  No acute findings in the visualized portions of the abdomen or pelvis.  No acute findings in the visualized lower chest.                                       Medications   LIDOcaine 5 % patch 1 patch (1 patch Transdermal Patch Applied 2/4/25 1405)   0.9% NaCl infusion ( Intravenous New Bag 2/4/25 2223)   ascorbic acid (vitamin C) tablet 500 mg (has no administration in time range)   gabapentin capsule 300 mg (300 mg Oral Given 2/4/25 2051)   propranoloL tablet 10 mg (10 mg Oral Given 2/4/25 2100)   sodium chloride 0.9% flush 10 mL (has no administration in time range)   naloxone 0.4 mg/mL injection 0.02 mg (has no administration in time range)   glucose chewable tablet 16 g (has no  administration in time range)   glucose chewable tablet 24 g (has no administration in time range)   dextrose 50% injection 12.5 g (has no administration in time range)   dextrose 50% injection 25 g (has no administration in time range)   glucagon (human recombinant) injection 1 mg (has no administration in time range)   enoxaparin injection 40 mg (40 mg Subcutaneous Given 2/4/25 2051)   simethicone chewable tablet 80 mg (has no administration in time range)   morphine injection 4 mg (has no administration in time range)   acetaminophen tablet 650 mg (650 mg Oral Given 2/4/25 2232)   magnesium oxide tablet 400 mg (400 mg Oral Given 2/4/25 2051)   cyclobenzaprine tablet 5 mg (has no administration in time range)   iohexoL (OMNIPAQUE 350) injection 75 mL (has no administration in time range)   thiamine injection 200 mg (has no administration in time range)   ondansetron injection 4 mg (has no administration in time range)   metoclopramide injection 5 mg (has no administration in time range)   sodium phosphate 15 mmol in D5W 250 mL IVPB (has no administration in time range)   levothyroxine injection 150 mcg (has no administration in time range)   cyanocobalamin tablet 1,000 mcg (has no administration in time range)   folic acid injection 1 mg (has no administration in time range)   ketorolac injection 30 mg (30 mg Intramuscular Given 2/4/25 1405)   methocarbamoL tablet 500 mg (500 mg Oral Given 2/4/25 1405)   dexAMETHasone injection 8 mg (8 mg Intramuscular Given 2/4/25 1405)   morphine injection 4 mg (4 mg Intravenous Given 2/4/25 1642)   calcitonin injection 254 Units (254 Units Subcutaneous Given 2/4/25 1733)   potassium chloride SA CR tablet 40 mEq (40 mEq Oral Given 2/4/25 2232)   sodium chloride 0.9% bolus 500 mL 500 mL (0 mLs Intravenous Stopped 2/4/25 2151)   iohexoL (OMNIPAQUE 350) injection 30 mL (30 mLs Oral Given 2/4/25 2231)   gadobutroL (GADAVIST) injection 6 mL (6 mLs Intravenous Given 2/4/25 2013)      Medical Decision Making  Patient presents to emergency room for right lower back pain.  Vital signs stable.  Physical exam as stated above.    Differential diagnosis includes but is not limited to muscle strain/sprain, slipped disc w/ radicular pain including sciatica, slipped disc w/ cauda equina syndrome, vertebral fracture, vertebral tumor, epidural abscess / discitis, or pyelonephritis.  CT lumbar spine with multiple lytic lesion, concerning for metastatic cancer.  Consistent with previous.  Patient has 2 pathological fractures, likely due to metastatic lesions.  She was given multiple pain medications without relief.  She will need admission for further pain control in addition to further metastatic workup, as patient does not have appropriate follow-up.    All available findings were reviewed with the patient/family in detail along with the indications for hospitalization in order to receive pain management and Heme-Onc consult.  All remaining questions and concerns were addressed at this time and the patient/family communicates understanding and agrees to proceed accordingly.  Similarly, all pertinent details of the encounter were discussed with [facility] who agrees to receive the patient at [facility] for further care as outlined above.     Problems Addressed:  Acute midline low back pain with right-sided sciatica: acute illness or injury  Metastatic cancer: acute illness or injury  Other closed fracture of fifth lumbar vertebra, initial encounter: acute illness or injury  Other closed fracture of fourth lumbar vertebra, initial encounter: acute illness or injury    Amount and/or Complexity of Data Reviewed  External Data Reviewed: notes.     Details: Patient is seen in 10/2024.  Had MRI done at that time which showed metastatic cancer to back.  Was prescribed medications and told to follow up for metastatic workup.  Labs: ordered. Decision-making details documented in ED  Course.  Radiology: ordered. Decision-making details documented in ED Course.    Risk  Prescription drug management.  Decision regarding hospitalization.  Diagnosis or treatment significantly limited by social determinants of health.  Risk Details: Comorbidities taken into consideration during the patient's evaluation and treatment include hypertension and thyroid disease.    Social determinants of health taken into consideration during development of our treatment plan include difficulty in obtaining follow-up, obtaining medications, health literacy, access to healthy options for preventative/conservative management, and/or support systems due to, but not limited to, transportation limitations, socioeconomic status, and environmental factors.                ED Course as of 02/04/25 2240   Tue Feb 04, 2025   1400 X-Ray Lumbar Spine Ap And Lateral  Impression:     Relative to prior radiograph performed 09/16/2024, there is a new moderate compression fracture at L4.  Recommend clinical correlation.  MRI would better characterize. [BJ]   1546 CT Lumbar Spine Without Contrast(!)  Impression:     Innumerable lytic lesions throughout the lumbar spine and pelvis, in keeping with metastatic disease or multiple myeloma.  Associated pathologic fractures at L4 and L5.  Multifocal extra osseous extension of disease with spinal canal and neural foraminal encroachment, which would be better delineated with MRI.     Partially visualized 5.6 cm partially calcified mass lesion in the right adnexal region.     Bilateral pleural effusions. [BJ]   1600 Discussed patient with the Dr. Garzon, [facility], who will accept patient for further pain management and Heme-Onc consultation due to metastatic cancer. [BJ]      ED Course User Index  [BJ] Kanchan Zamorano PA-C                           Clinical Impression:  Final diagnoses:  [C79.9] Metastatic cancer  [M54.41] Acute midline low back pain with right-sided sciatica  (Primary)  [S32.048A] Other closed fracture of fourth lumbar vertebra, initial encounter  [S32.058A] Other closed fracture of fifth lumbar vertebra, initial encounter          ED Disposition Condition    Observation                This note was partially created using [employer] Voice Recognition software. Typographical and content errors may occur with this process. While efforts are made to detect and correct such errors, in some cases errors will persist. For this reason, wording in this document should be considered in the proper context and not strictly verbatim.        Kanchan Zamorano PA-C  02/04/25 1380

## 2025-02-05 PROBLEM — E83.52 HYPERCALCEMIA OF MALIGNANCY: Status: ACTIVE | Noted: 2025-01-01

## 2025-02-05 PROBLEM — E44.1 MILD MALNUTRITION: Status: ACTIVE | Noted: 2025-01-01

## 2025-02-05 PROBLEM — C79.9 METASTATIC CANCER: Status: ACTIVE | Noted: 2025-01-01

## 2025-02-05 NOTE — PLAN OF CARE
Pt arrived to unit [facility], in personal clothing, with IVF infusing. Initial assessment found area above, below and around left breast to be hard, edematous and caked with tissue paper. Normal saline used to remove caked on tissue from area to assess site. Photos entered in chart. Several layers of putrid smelling tissue removed from open wound around areola and below breast. Wound cleansed with Vashe. Xeroform and ABD pads applied to area and secured with Medipore tape. Wound care consult placed. Pt taken to CT around 2330 after completing oral contrast prep. Pt tolerated prep and procedure well. Plan of care and safety protocols were reviewed with pt. Pt assisted to BSC to void. Very weak but able to pivot from bed to BSC and back with 1x assistance. Pt's dirty clothes placed in plastic bag and secured with other belongings. Bed in low/locked position with call light within reach , upper rails raised and alarms set. Medications administered per MAR. Safety maintained. Pt encouraged to call with any needs.     Problem: Adult Inpatient Plan of Care  Goal: Optimal Comfort and Wellbeing  Outcome: Progressing     Problem: Pain Acute  Goal: Optimal Pain Control and Function  Outcome: Progressing     Problem: Wound  Goal: Optimal Coping  Outcome: Progressing  Goal: Skin Health and Integrity  Outcome: Not Progressing  Goal: Optimal Wound Healing  Outcome: Not Progressing

## 2025-02-05 NOTE — PLAN OF CARE
Recommendation:  1. Add Boost Plus BID.   2 Monitor weight/labs   3. RD to follow up to monitor PO intake    Goals:  Pt to tolerate diet and meet 25-50% of needs by RD follow up.  Nutrition Goal Status: new

## 2025-02-05 NOTE — PROGRESS NOTES
Individual Follow-Up Form    2/5/2025    Quit Date: To be determined    Clinical Status of Patient: Inpatient    Length of Service: 30 minutes    Comments: Smoking cessation education note: Pt is a (just under) 1 pk/day cigarette smoker x 48 yrs. Order requested for 21 mg nicotine patch Q day. Handout provided and pt encouraged to contact Ambulatory Smoking Cessation clinic if additional resources are needed.     Diagnosis: F17.210

## 2025-02-05 NOTE — CONSULTS
Hematology / Oncology   Consult Note    Consult Requested By: Yulia Wolf MD  Reason for Consult: concern for metastatic cancer    SUBJECTIVE:   Admit date: 2025  History of Present Illness: Ms. Torres is a 64 y.o. female w/ prior history of hypertension and hypothyroidism. She also notes family medical history notable for cousins with breast cancer, a grandmother with cervical cancer, and an uncle with pancreas cancer. She notes that she has been aware of a mass in the left breast for the past approximately 6 months; she has also noted increasing generalized pain/malaise/fatigue over that timespan. Yesterday the pain reached the point that she felt like she needed medical attention and presented to the ED. Workup in ED notable for CT scans showing innumerable lytic lesions throughout the skeleton as well as a left breast mass, suspicious left adrenal nodule, 1.5cm liver lesion, 1.0 cm RUL lung nodule. Heme/onc consulted for further eval/recs. She was seen in room 510 this afternoon accompanied by a sibling. She reports a lot of ongoing pain as well as anxiety and being frightened about possible cancer diagnosis.         PTA Medications   Medication Sig    amLODIPine (NORVASC) 10 MG tablet Take 10 mg by mouth once daily.    ascorbic acid, vitamin C, (VITAMIN C) 500 MG tablet Take 500 mg by mouth once daily.    gabapentin (NEURONTIN) 300 MG capsule Take 300 mg by mouth 2 (two) times daily.    levothyroxine (SYNTHROID) 150 MCG tablet Take 150 mcg by mouth once daily.    lisinopriL-hydrochlorothiazide (PRINZIDE,ZESTORETIC) 20-25 mg Tab Take 1 tablet by mouth once daily.    propranolol (INDERAL) 10 MG tablet Take 10 mg by mouth 2 (two) times daily.      Review of patient's allergies indicates:  No Known Allergies    Past Medical History:   Diagnosis Date    Hypertension     Thyroid disease     hypothyroid       Past Surgical History:   Procedure Laterality Date     SECTION      x2      No family history on  "file.    Social History     Tobacco Use    Smoking status: Every Day     Current packs/day: 0.90     Average packs/day: 0.9 packs/day for 48.1 years (43.3 ttl pk-yrs)     Types: Cigarettes     Start date: 1977    Tobacco comments:     Pt is a (just under) 1 pk/day cigarette smoker x 48 yrs. Order requested for 21 mg nicotine patch Q day. Handout provided.   Substance Use Topics    Alcohol use: No    Drug use: Not Currently        OBJECTIVE:     Vital Signs (Most Recent)   Temp: 97.6 °F (36.4 °C) (02/05/25 1109)  Pulse: 87 (02/05/25 1109)  Resp: 20 (02/05/25 1222)  BP: 133/82 (02/05/25 1109)  SpO2: (!) 93 % (02/05/25 1109)  Body mass index is 18.43 kg/m².      Physical Exam:  Physical Exam  Vitals and nursing note reviewed.   Constitutional:       General: She is not in acute distress.     Appearance: Normal appearance. She is not toxic-appearing.   HENT:      Head: Normocephalic and atraumatic.   Eyes:      General: No scleral icterus.     Conjunctiva/sclera: Conjunctivae normal.   Cardiovascular:      Rate and Rhythm: Normal rate.   Pulmonary:      Effort: Pulmonary effort is normal. No respiratory distress.   Abdominal:      General: There is no distension.   Musculoskeletal:         General: No swelling or deformity.      Cervical back: Neck supple.   Skin:     Coloration: Skin is not jaundiced.      Findings: No erythema.   Neurological:      General: No focal deficit present.      Mental Status: She is alert and oriented to person, place, and time.      Motor: No weakness.   Psychiatric:         Mood and Affect: Mood normal.         Behavior: Behavior normal.         Cardiac Enzymes: Ejection Fractions:    No results for input(s): "CPK", "CPKMB", "MB", "TROPONINI" in the last 72 hours. No results found for: "EF"     Chemistries:   Recent Labs   Lab 02/04/25  1632 02/04/25  1641 02/04/25  2316 02/05/25  0248     --  138 139   K 3.3*  --  4.0 4.5     --  104 107   CO2 22*  --  21* 19*   BUN 14  --  11 " "10   CREATININE 0.8  --  0.8 0.7   CALCIUM 12.9*  --  11.3* 10.9*   PROT 7.2  --  7.4 6.9   BILITOT 0.6  --  0.6 0.6   ALKPHOS 147  --  144 130   ALT 8*  --  9* 6*   AST 51*  --  55* 52*   MG  --  2.2  --  2.2   PHOS  --   --   --  3.0        CBC/Anemia Labs: Coags:    Recent Labs   Lab 02/04/25  1632 02/05/25  0248   WBC 9.95 11.30   HGB 14.3 13.3   HCT 42.5 40.4    429   MCV 84 85   RDW 17.5* 17.3*    No results for input(s): "PT", "INR", "APTT" in the last 168 hours.     POCT Glucose: HbA1c:    No results for input(s): "POCTGLUCOSE" in the last 168 hours. No results found for: "HGBA1C"     Lines/Drains:       Peripheral IV - Single Lumen 02/04/25 1632 20 G 1 in Left Antecubital (Active)   Site Assessment Clean;Dry;Intact;No redness;No swelling 02/05/25 0846   Line Securement Device Antimicrobial Adhesive 02/04/25 2205   Extremity Assessment Distal to IV No abnormal discoloration 02/05/25 0846   Line Status Infusing 02/05/25 0846   Dressing Status Clean;Dry;Intact 02/05/25 0846   Dressing Intervention Integrity maintained 02/05/25 0846   Dressing Change Due 02/08/25 02/05/25 0846   Site Change Due 02/08/25 02/05/25 0846   Reason Not Rotated Not due 02/05/25 0846   Number of days: 0            Peripheral IV - Single Lumen 02/05/25 0702 20 G No Anterior;Left Forearm (Active)   Site Assessment Clean;Dry;Intact;No redness;No swelling 02/05/25 0846   Extremity Assessment Distal to IV No abnormal discoloration 02/05/25 0846   Line Status Flushed;Saline locked 02/05/25 0846   Dressing Status Clean;Dry;Intact 02/05/25 0846   Dressing Intervention Integrity maintained 02/05/25 0846   Dressing Change Due 02/09/25 02/05/25 0846   Site Change Due 02/09/25 02/05/25 0846   Reason Not Rotated Not due 02/05/25 0846   Number of days: 0     CT Chest Abdomen Pelvis W W/O Contrast (XPD)  Order: [id number]  Status: Final result       Visible to patient: No (inaccessible in MyChart)       Next appt: None    0 Result " Notes  Details    Reading Physician Reading Date Result Priority   Nawaf Duffy MD  [phone] 2/5/2025 STAT     Narrative & Impression  EXAMINATION:  CT CHEST ABDOMEN PELVIS W W/O CONTRAST (XPD)     CLINICAL HISTORY:  Metastatic disease evaluation;     TECHNIQUE:  Axial images of the chest, abdomen, and pelvis were acquired before and after the use of 75 cc [id number] IV contrast. 30 cc of Omnipaque 350 oral contrast was administered.  Coronal and sagittal reconstructions were also obtained     COMPARISON:  None     FINDINGS:  Thoracic soft tissues: Normal thyroid. Soft tissue mass in the left breast measures 4.6 x 2.7 cm (series 2, image 75).  There is significant wall thickening of the dermis.     Aorta: Thoracic aorta is normal in caliber and contour with no significant calcific atherosclerosis.     Heart: Normal in size. No pericardial effusion. No significant calcific coronary atherosclerosis.     Bailey/Mediastinum: No significant mediastinal, hilar, or axillary lymphadenopathy     Lungs: Trachea and bronchi are patent.  Moderate bilateral pleural effusions.  Mild paraseptal and centrilobular emphysema.  Atelectasis of the lower lobes bilaterally.  1.0 x 0.6 cm nodule in the right upper lobe (series 8, image 113).  A few scattered smaller pulmonary micro nodules.  Interlobular septal thickening with a lower lobe predominance with linear opacities which may represent atelectasis or pneumonia.     Liver: Normal in size and contour.  1.5 x 1.2 cm enhancing lesion in the right hepatic lobe (series 4, image 85).  This becomes isodense on delayed phase imaging.  No other focal hepatic lesions.     Gallbladder: No calcified gallstones.     Bile Ducts: No evidence of dilated ducts.     Pancreas: No mass or peripancreatic fat stranding.     Spleen: Unremarkable.     Stomach and duodenum: Unremarkable.     Adrenals: 2.5 x 2.2 cm indeterminate left adrenal nodule.     Kidneys/ Ureters: Normal in size and location.  Normal enhancement. No hydronephrosis or nephrolithiasis. No ureteral dilatation.     Bladder: No evidence of wall thickening.     Reproductive organs: Partially calcified right adnexal mass measuring 5.9 x 4.3 cm.     Bowel/Mesentery: Multiple fluid-filled loops of small bowel which are minimally dilated measuring up to 3.1 cm.  No discrete transition point is visualized.  Air and stool within the colon.  Normal appendix.  Findings are suggestive of partial small bowel obstruction or ileus.  Colon demonstrates no focal wall thickening.     Lymph nodes: No lymphadenapathy.     Abdominal wall:  Unremarkable.     Vasculature: No aneurysm. Moderate calcific atherosclerosis.     Bones: Diffuse osseous destructive lesions consistent with diffuse metastatic disease involving the spine and pelvis as well as multiple ribs.     Impression:     Mass in the left breast concerning for primary malignancy.     Diffuse osseous metastatic disease.     2.5 cm indeterminate left adrenal nodule, possibly metastatic disease.     Indeterminate right adnexal calcified lesion.  Differential considerations include an ovarian mass versus metastatic disease.     1.5 cm enhancing focus which becomes isodense on delayed phase, indeterminate and possibly representing variant perfusion although metastatic disease is not entirely excluded but felt less likely.     1.0 cm nodule in the right upper lobe.     Moderate bilateral pleural effusions.        Electronically signed by:Nawaf Duffy MD  Date:                                            02/05/2025  Time:                                           07:19        Exam Ended: 02/05/25 01:01 CST Last Resulted: 02/05/25 07:19 CST       ASSESSMENT/PLAN:     There are no hospital problems to display for this patient.      SUMMARY: 64 y.o. female here with malignant pain related to (presumed) bony metastases. A biopsy would be appropriate. If this can be done before patient is discharged that would  be very helpful however I recognize that this may not be practical. Recommend biopsy of breast or adrenal gland. She would need follow-up in heme/onc clinic after discharge to determine the optimal treatment.        Alan Damian MD, FACP  Hematology & Medical Oncology  [facility]

## 2025-02-05 NOTE — ASSESSMENT & PLAN NOTE
Malnutrition Type:  Context: acute illness or injury  Level: mild    Related to (etiology):   cancer    Signs and Symptoms (as evidenced by):   Weight loss, mild muscle wasting/fat loss     Malnutrition Characteristic Summary:  Weight Loss (Malnutrition): greater than 10% in 6 months (23.31% x 5 months)  Energy Intake (Malnutrition): less than 75% for greater than or equal to 1 month  Subcutaneous Fat (Malnutrition): mild depletion  Muscle Mass (Malnutrition): mild depletion    Interventions:  Commercial beverage    Nutrition Diagnosis Status:   New

## 2025-02-05 NOTE — PROGRESS NOTES
Pharmacist Renal Dose Adjustment Note    Nora Torres is a 64 y.o. female being treated with the medication enoxaparin    Patient Data:    Vital Signs (Most Recent):  Temp: 97.6 °F (36.4 °C) (02/05/25 1109)  Pulse: 87 (02/05/25 1109)  Resp: 18 (02/05/25 1440)  BP: 133/82 (02/05/25 1109)  SpO2: (!) 93 % (02/05/25 1109) Vital Signs (72h Range):  Temp:  [97.3 °F (36.3 °C)-98.1 °F (36.7 °C)]   Pulse:  []   Resp:  [15-20]   BP: (125-171)/()   SpO2:  [93 %-95 %]      Recent Labs   Lab 02/04/25  1632 02/04/25  2316 02/05/25  0248   CREATININE 0.8 0.8 0.7     Serum creatinine: 0.7 mg/dL 02/05/25 0248  Estimated creatinine clearance: 64.1 mL/min    Medication:enoxaparin dose: 40mg frequency q24h will be changed to medication:enoxaparin dose:30mg frequency:q24h    Pharmacist's Name: Don Booker  Pharmacist's Extension: [phone]

## 2025-02-05 NOTE — PROGRESS NOTES
Pharmacist Intervention IV to PO Note    Nora Torres is a 64 y.o. female being treated with IV medication folic acid    Patient Data:    Vital Signs (Most Recent):  Temp: 97.6 °F (36.4 °C) (02/05/25 1109)  Pulse: 87 (02/05/25 1109)  Resp: 18 (02/05/25 1440)  BP: 133/82 (02/05/25 1109)  SpO2: (!) 93 % (02/05/25 1109) Vital Signs (72h Range):  Temp:  [97.3 °F (36.3 °C)-98.1 °F (36.7 °C)]   Pulse:  []   Resp:  [15-20]   BP: (125-171)/()   SpO2:  [93 %-95 %]      CBC:  Recent Labs   Lab 02/04/25  1632 02/05/25  0248   WBC 9.95 11.30   RBC 5.06 4.77   HGB 14.3 13.3   HCT 42.5 40.4    429   MCV 84 85   MCH 28.3 27.9   MCHC 33.6 32.9     CMP:     Recent Labs   Lab 02/04/25  1632 02/04/25  2316 02/05/25  0248   * 142* 128*   CALCIUM 12.9* 11.3* 10.9*   ALBUMIN 3.2* 3.3* 3.1*   PROT 7.2 7.4 6.9    138 139   K 3.3* 4.0 4.5   CO2 22* 21* 19*    104 107   BUN 14 11 10   CREATININE 0.8 0.8 0.7   ALKPHOS 147 144 130   ALT 8* 9* 6*   AST 51* 55* 52*   BILITOT 0.6 0.6 0.6       Dietary Orders:  Diet Orders            Diet NPO: NPO starting at 02/06 0001    Dietary nutrition supplements Boost Plus Nutritional Drink - Any flavor starting at 02/05 1800    Diet Adult Regular: Regular starting at 02/04 1717            Based on the following criteria, this patient qualifies for intravenous to oral conversion:  [x] The patients gastrointestinal tract is functioning (tolerating medications via oral or enteral route for 24 hours and tolerating food or enteral feeds for 24 hours).  [x] The patient is hemodynamically stable for 24 hours (heart rate <100 beats per minute, systolic blood pressure >99 mm Hg, and respiratory rate <20 breaths per minute).  [x] The patient shows clinical improvement (afebrile for at least 24 hours and white blood cell count downtrending or normalized). Additionally, the patient must be non-neutropenic (absolute neutrophil count >500 cells/mm3).  [x] For antimicrobials, the  patient has received IV therapy for at least 24 hours.    IV medication folic acid will be changed to oral medication     Pharmacist's Name: Don Booker  Pharmacist's Extension: [phone]     No

## 2025-02-05 NOTE — PLAN OF CARE
Problem: Adult Inpatient Plan of Care  Goal: Plan of Care Review  Outcome: Progressing     Problem: Adult Inpatient Plan of Care  Goal: Patient-Specific Goal (Individualized)  Outcome: Progressing     VN note:   Patient's chart, labs and vital signs reviewed, will be available to intervene if needed.

## 2025-02-05 NOTE — SUBJECTIVE & OBJECTIVE
Interval History: Pain is not well-controlled, morphine is causing some sedation and does not last. Agreeable to trying Percocet    Ca++ is 12.9--->10.9, on NS at 100ml/hr    Review of Systems   Constitutional:  Negative for chills and fever.   Respiratory:  Positive for shortness of breath.    Musculoskeletal:  Positive for back pain and gait problem.     Objective:     Vital Signs (Most Recent):  Temp: 97.6 °F (36.4 °C) (02/05/25 1109)  Pulse: 87 (02/05/25 1109)  Resp: 18 (02/05/25 1440)  BP: 133/82 (02/05/25 1109)  SpO2: (!) 93 % (02/05/25 1109) Vital Signs (24h Range):  Temp:  [97.3 °F (36.3 °C)-98.1 °F (36.7 °C)] 97.6 °F (36.4 °C)  Pulse:  [] 87  Resp:  [15-20] 18  SpO2:  [93 %-95 %] 93 %  BP: (125-171)/(82-94) 133/82     Weight: 48.7 kg (107 lb 5.8 oz)  Body mass index is 18.43 kg/m².    Intake/Output Summary (Last 24 hours) at 2/5/2025 1450  Last data filed at 2/5/2025 0711  Gross per 24 hour   Intake 1182.29 ml   Output 800 ml   Net 382.29 ml         Physical Exam  Constitutional:       Appearance: Normal appearance. She is ill-appearing.   HENT:      Head: Normocephalic and atraumatic.   Eyes:      Extraocular Movements: Extraocular movements intact.      Pupils: Pupils are equal, round, and reactive to light.   Cardiovascular:      Rate and Rhythm: Normal rate and regular rhythm.   Pulmonary:      Comments: L breast mass  Skin:     General: Skin is warm and dry.      Coloration: Skin is not jaundiced.   Neurological:      General: No focal deficit present.      Mental Status: She is alert and oriented to person, place, and time.             Significant Labs: All pertinent labs within the past 24 hours have been reviewed.  CBC:   Recent Labs   Lab 02/04/25  1632 02/05/25  0248   WBC 9.95 11.30   HGB 14.3 13.3   HCT 42.5 40.4    429     CMP:   Recent Labs   Lab 02/04/25  1632 02/04/25  2316 02/05/25  0248    138 139   K 3.3* 4.0 4.5    104 107   CO2 22* 21* 19*   * 142* 128*    BUN 14 11 10   CREATININE 0.8 0.8 0.7   CALCIUM 12.9* 11.3* 10.9*   PROT 7.2 7.4 6.9   ALBUMIN 3.2* 3.3* 3.1*   BILITOT 0.6 0.6 0.6   ALKPHOS 147 144 130   AST 51* 55* 52*   ALT 8* 9* 6*   ANIONGAP 13 13 13       Significant Imaging: I have reviewed all pertinent imaging results/findings within the past 24 hours.

## 2025-02-05 NOTE — CONSULTS
[place] [facility]  Adult Nutrition  Consult Note    SUMMARY     Recommendations    Recommendation:  1. Add Boost Plus BID.   2 Monitor weight/labs   3. RD to follow up to monitor PO intake    Goals:  Pt to tolerate diet and meet 25-50% of needs by RD follow up.  Nutrition Goal Status: new  Communication of RD Recs: reviewed with RN    Assessment and Plan  Endocrine  Mild malnutrition  Malnutrition Type:  Context: acute illness or injury  Level: mild    Related to (etiology):   cancer    Signs and Symptoms (as evidenced by):   Weight loss, mild muscle wasting/fat loss     Malnutrition Characteristic Summary:  Weight Loss (Malnutrition): greater than 10% in 6 months (23.31% x 5 months)  Energy Intake (Malnutrition): less than 75% for greater than or equal to 1 month  Subcutaneous Fat (Malnutrition): mild depletion  Muscle Mass (Malnutrition): mild depletion    Interventions:  Commercial beverage    Nutrition Diagnosis Status:   New       Malnutrition Assessment  Malnutrition Context: acute illness or injury  Malnutrition Level: mild  Weight Loss (Malnutrition): greater than 10% in 6 months (23.31% x 5 months)  Energy Intake (Malnutrition): less than 75% for greater than or equal to 1 month  Subcutaneous Fat (Malnutrition): mild depletion  Muscle Mass (Malnutrition): mild depletion   Orbital Region (Subcutaneous Fat Loss): mild depletion  Upper Arm Region (Subcutaneous Fat Loss): mild depletion  Thoracic and Lumbar Region: mild depletion   Gnosticist Region (Muscle Loss): mild depletion  Clavicle Bone Region (Muscle Loss): mild depletion  Clavicle and Acromion Bone Region (Muscle Loss): mild depletion  Scapular Bone Region (Muscle Loss): mild depletion  Dorsal Hand (Muscle Loss): mild depletion  Patellar Region (Muscle Loss): mild depletion  Anterior Thigh Region (Muscle Loss): mild depletion  Posterior Calf Region (Muscle Loss): mild depletion       Reason for Assessment  Reason For Assessment: consult  "(Cancer)  Diagnosis:  (back pain)  General Information Comments: Pt admitted c/o lower back and leg pain that has been worsening. Was previosuly admitted to ED (10/20) with back pain and MRI was conducted showing metastatic cancer. Pt did not follow up with pcp regarding this finding. Pt reports that the pain is worse and now radiates down her rt leg. Pt reports leg weakness and that she cannot walk more than a few steps with out a cane. Reports decreased appetite. Pt has pmh of HTN and thyroid disease. Pt on a regular diet. Noted a 33 lb weight loss x 5 months. Evan 20- left upper;lower breast wound. Percent intake of meals not yet recorded.  Nutrition Discharge Planning: pt to d/c on regular diet with boost plus supplement BID    Past Medical History:   Diagnosis Date    Hypertension     Thyroid disease     hypothyroid        Nutrition/Diet History  Food Preferences: no Lutheran or cultural food preferences  Food Allergies: NKFA  Factors Affecting Nutritional Intake: decreased appetite, pain    Anthropometrics  Height: 5' 4" (162.6 cm)  Height (inches): 64 in  Weight: 48.7 kg (107 lb 5.8 oz)  Weight (lb): 107.37 lb  Weight Method: Bed Scale  Ideal Body Weight (IBW), Female: 120 lb  % Ideal Body Weight, Female (lb): 89.48 %  BMI (Calculated): 18.4  BMI Grade: less than 18.5 - underweight  Weight Loss: unintentional  Usual Body Weight (UBW), k.5 kg (  Simultaneous filing. User may not have seen previous data.)  % Usual Body Weight: 76.85  % Weight Change From Usual Weight: -23.31 %     Lab/Procedures/Meds  Pertinent Labs Reviewed: reviewed  Pertinent Labs Comments: Glu 128H, Ca 10.9H, Albumin 3.1L  Pertinent Medications Reviewed: reviewed  Pertinent Medications Comments: ascorbic acid, cyancobalmin, dexamethasone, enoxaparin, folic acid, gabapentin    Estimated/Assessed Needs  Weight Used For Calorie Calculations: 54.4 kg (120 lb) (IBW)  Energy Calorie Requirements (kcal): 1633  Energy Need Method: " Kcal/kg (30 kcal/kg IBW)  Protein Requirements: 82 g (1.5 g/kg IBW)  Weight Used For Protein Calculations: 54.4 kg (120 lb)  Estimated Fluid Requirement Method: RDA Method  RDA Method (mL): 1633     Nutrition Prescription Ordered  Current Diet Order: regular diet    Evaluation of Received Nutrient/Fluid Intake  I/O: 180/800  Energy Calories Required: not meeting needs  Protein Required: not meeting needs  Fluid Required: not meeting needs  Comments: LBM 2/5  % Intake of Estimated Energy Needs: 25 - 50 %  % Meal Intake: 25 - 50 %    Nutrition Risk  Level of Risk/Frequency of Follow-up:  (2xweekly)     Monitor and Evaluation  Food and Nutrient Intake: food and beverage intake  Food and Nutrient Adminstration: diet order  Knowledge/Beliefs/Attitudes: food and nutrition knowledge/skill  Physical Activity and Function: nutrition-related ADLs and IADLs  Anthropometric Measurements: weight, weight change, body mass index  Biochemical Data, Medical Tests and Procedures: electrolyte and renal panel  Nutrition-Focused Physical Findings: overall appearance     Nutrition Related Social Determinants of Health: SDOH: Adequate food in home environment     Nutrition Follow-Up  RD Follow-up?: Yes

## 2025-02-05 NOTE — ASSESSMENT & PLAN NOTE
"Hypercalcemia is likely due to Malignancy. The patient has the following symptoms due to their hypercalcemia: weakness. Their most recent calcium and albumin results are listed below.  Recent Labs     02/04/25  1632 02/04/25  2316 02/05/25  0248   CALCIUM 12.9* 11.3* 10.9*   ALBUMIN 3.2* 3.3* 3.1*     Plan  - Obtain the following labs to work up the cause of hypercalcemia: PTHrP No results found for: "PTHRP" .   - Treat the hypercalcemia with: IV fluids ordered at a rate of 100 ml/hr.   - The patient's hypercalcemia is improving.   - S/p 1 dose of calcitonin   "

## 2025-02-05 NOTE — HPI
Ms. Nora Torres is a 64 y.o. 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  The patient was previously admitted to the ED on October 20, 2024, for back pain, at which time an MRI revealed innumerable sclerotic bone lesions, a mild pathologic compression fracture of L5, and mild neural foraminal stenosis at L4/L5 and L5-S1. She was advised to follow up with her PCP, which she did; however, she did not disclose the findings of metastatic cancer.  Since then, the patient reports progressively worsening pain radiating to the right leg. She also notes increasing leg weakness and is unable to walk more than a few steps with a cane. Additionally, she reports a decreased appetite, a 30-pound unintentional weight loss, and reduced oral intake. However, she denies any loss of bowel or bladder control.     In the ED, patient was hypertensive 165/100, tachycardic to 100 , EKGs showed sinus tachycardia.  CT lumbar spine was done which showed innumerable lytic lesions throughout the lumbar spine and pelvis with pathological fracture of L4-L5 .  Labs were significant for hypokalemia 3.3, mildly elevated lactate 2.5, bicarb 22, elevated TSH 40 with low free T4 0.49, hypercalcemia 12.9.  Patient was given dexamethasone, morphine

## 2025-02-05 NOTE — CONSULTS
Palliative Care Consult Note:      Consult received.  Medical record reviewed,discussed with referring provider. Physician's goal for PM consult is pain and symptom management. Full consult with Palliative Medicine provider to follow.    Met with pt and 2 siblings at the bedside.  Pt reports pain at present.  Discussed multimodal approach to symptom management and utilization of PRN and scheduled medications.  Percocet 5mg adjusted to Q4H PRN, morphine, lidocaine patches, gabapentin, flexaril as well as dexamethasone. Will follow up with pt in am to monitor symptom control once mediations have been utilized.       Thank you for allowing the Palliative Medicine team  to be apart of this patient's care.    Demetrice Melendez   Palliative Medicine  [facility]

## 2025-02-05 NOTE — PROGRESS NOTES
02/04/25 2225   Admission   Initial VN Admission Questions Complete   Communication Issues? None   Shift   Virtual Nurse - Patient Verbalized Approval Of Camera Use   Safety/Activity   Patient Rounds bed in low position;placement of personal items at bedside;call light in patient/parent reach;visualized patient;clutter free environment maintained   Safety Promotion/Fall Prevention assistive device/personal item within reach;Fall Risk reviewed with patient/family;patient expresses understanding of fall risk and prevention;instructed to call staff for mobility;medications reviewed;side rails raised x 2

## 2025-02-05 NOTE — PLAN OF CARE
"   02/05/25 1030   Rounds   Attendance Provider;Nurse    Discharge Plan A Home Health   Why the patient remains in the hospital Requires continued medical care       1030  CM was informed by Dr Fay that the pt is not medically stable to discharge. Pt was admitted with metastatic cancer & is being followed by hem/onc, rad/onc, pall care, endo, wound care, & dty.       [facility] - Med Surg  Initial Discharge Assessment       Primary Care Provider: Bobbi Booker MD (Cindy)    Admission Diagnosis: Metastatic cancer [C79.9]  Chest pain [R07.9]    Admission Date: 2/4/2025  Expected Discharge Date: 2/7/2025    Consult: hem/onc, rad/onc, pall care, endo, wound care, & dty    Payor: MEDICAID / Plan: [place] / Product Type: Managed Medicaid /     Extended Emergency Contact Information  Primary Emergency Contact: Mino Torres   [facility]  Home Phone: [phone]  Relation: Son    Discharge Plan A: Home Health  Discharge Plan B: Home with family      [id number] - [place] - [address] AT [facility] [address]  [address] [place] [place] [address]  Phone: [phone] Fax: [phone]      Initial Assessment (most recent)       Adult Discharge Assessment - 02/05/25 1255          Discharge Assessment    Assessment Type Discharge Planning Assessment     Confirmed/corrected address, phone number and insurance Yes     Confirmed Demographics Correct on Facesheet     Source of Information patient     Communicated DRU with patient/caregiver Date not available/Unable to determine     People in Home --   sisterBailey "Stevie" Talib ([phone])    Do you expect to return to your current living situation? Yes     Do you have help at home or someone to help you manage your care at home? Yes     Prior to hospitilization cognitive status: Alert/Oriented     Current cognitive status: Alert/Oriented     Equipment Currently Used at Home cane, " straight;walker, rolling     Readmission within 30 days? No     Patient currently being followed by outpatient case management? No     Do you currently have service(s) that help you manage your care at home? No     Do you take prescription medications? Yes     Do you have prescription coverage? Yes     Do you have any problems affording any of your prescribed medications? No     Is the patient taking medications as prescribed? yes     How do you get to doctors appointments? family or friend will provide     Are you on dialysis? No     Do you take coumadin? No     Discharge Plan A Home Health     Discharge Plan B Home with family     DME Needed Upon Discharge  other (see comments)   tbd    Discharge Plan discussed with: Patient        Physical Activity    On average, how many days per week do you engage in moderate to strenuous exercise (like a brisk walk)? 0 days     On average, how many minutes do you engage in exercise at this level? 0 min        Financial Resource Strain    How hard is it for you to pay for the very basics like food, housing, medical care, and heating? Very hard        Housing Stability    In the last 12 months, was there a time when you were not able to pay the mortgage or rent on time? Yes        Transportation Needs    Has the lack of transportation kept you from medical appointments, meetings, work or from getting things needed for daily living? Yes, it has kept me from medical appointments or from getting my medications.        Food Insecurity    Within the past 12 months, you worried that your food would run out before you got the money to buy more. Often true     Within the past 12 months, the food you bought just didn't last and you didn't have money to get more. Often true        Stress    Do you feel stress - tense, restless, nervous, or anxious, or unable to sleep at night because your mind is troubled all the time - these days? To some extent        Social Isolation    How often do  "you feel lonely or isolated from those around you?  Never        Alcohol Use    Q1: How often do you have a drink containing alcohol? Never     Q2: How many drinks containing alcohol do you have on a typical day when you are drinking? Patient does not drink     Q3: How often do you have six or more drinks on one occasion? Never        Utilities    In the past 12 months has the electric, gas, oil, or water company threatened to shut off services in your home? No        Health Literacy    How often do you need to have someone help you when you read instructions, pamphlets, or other written material from your doctor or pharmacy? Rarely   pt wears glasses                  1255  Patient c/o pain in bed when CM rounded but stated that she recently received pain medications. No family present.    Patient stated that she moved in with her sister, Bailey Mayers (Yasha) ([phone]), in November 2024 but was unsure of the address, has equipment to assist with ADLs, & denied the need for assistance with transportation at time of discharge. Pt stated that she went to [facility] in October 2024 due to right leg pain, was told she had cancer, & was instructed to f/u with her PCP. Pt stated she just did not believe it was true.     Pt stated that Dr Bobbi Booker used to be her PCP but she left the clinic & is followed by another MD at the same clinic but does not know his name. Pt gave this  permission to contact Stevie to obtained missing information.     CM updated patient's whiteboard with CM name & contact information.     153  CM was informed by Stevie that the pt lives at [address] [place] [address], [facility] with her & her 3 dep children and that the pt's PCP is Dr Bebo Murillo at the [facility] clinic on [facility] but that they would like the pt to changed PCPs to the [facility] clinic on [facility] and that the pt's other sister, Herlinda Torres ([phone]), knows the MD's name. CM was informed by Herlinda that they would like the pt to " get established with Dr Hernandez Booker. Pt does not currently have a hem/onc MD.       Will continue to follow.

## 2025-02-05 NOTE — HOSPITAL COURSE
"  Presented with worsening back pain, progressive leg weakness, and unintentional weight loss. Imaging revealed numerous lytic bone lesions and a pathological L4-L5 fracture. She developed acute hypoxemic respiratory failure, requiring supplemental oxygen, and was found to have hypercalcemia likely secondary to malignancy. Heme/Onc was consulted, and an outpatient iliac biopsy was planned. Her pain was managed effectively, and she remained hemodynamically stable. Given her significant functional decline, family opted for nursing home placement, which was arranged prior to discharge.    2/5/25 Heme/Onc consult, recommend biopsy, will consult IR  2/6/25 IR plans for outpatient iliac biopsy  2/7/25 MRI brain wwo skull lesion  2/8: patient not able to walk her as her pain goes up to a pain level of 10 when standing, when sitting up on side the bed her oxygen sats were in the 80"s with therapy this morning.   2/9:family wants NH placement  2/10: pt agreeable to NH placement which is pending      "

## 2025-02-05 NOTE — PROGRESS NOTES
[facility] Medicine  Progress Note    Patient Name: Nora Torres  MRN: [id number]  Patient Class: OP- Observation   Admission Date: 2/4/2025  Length of Stay: 0 days  Attending Physician: Angeli Fay MD  Primary Care Provider: Bobbi Booker MD (Cindy)        Subjective     Principal Problem:<principal problem not specified>        HPI:  Ms. Nora Torres is a 64 y.o. 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  The patient was previously admitted to the ED on October 20, 2024, for back pain, at which time an MRI revealed innumerable sclerotic bone lesions, a mild pathologic compression fracture of L5, and mild neural foraminal stenosis at L4/L5 and L5-S1. She was advised to follow up with her PCP, which she did; however, she did not disclose the findings of metastatic cancer.  Since then, the patient reports progressively worsening pain radiating to the right leg. She also notes increasing leg weakness and is unable to walk more than a few steps with a cane. Additionally, she reports a decreased appetite, a 30-pound unintentional weight loss, and reduced oral intake. However, she denies any loss of bowel or bladder control.     In the ED, patient was hypertensive 165/100, tachycardic to 100 , EKGs showed sinus tachycardia.  CT lumbar spine was done which showed innumerable lytic lesions throughout the lumbar spine and pelvis with pathological fracture of L4-L5 .  Labs were significant for hypokalemia 3.3, mildly elevated lactate 2.5, bicarb 22, elevated TSH 40 with low free T4 0.49, hypercalcemia 12.9.  Patient was given dexamethasone, morphine    Overview/Hospital Course:  2/5/25 Heme/Onc consult, recommend biopsy, will consult IR    Interval History: Pain is not well-controlled, morphine is causing some sedation and  does not last. Agreeable to trying Percocet    Ca++ is 12.9--->10.9, on NS at 100ml/hr    Review of Systems   Constitutional:  Negative for chills and fever.   Respiratory:  Positive for shortness of breath.    Musculoskeletal:  Positive for back pain and gait problem.     Objective:     Vital Signs (Most Recent):  Temp: 97.6 °F (36.4 °C) (02/05/25 1109)  Pulse: 87 (02/05/25 1109)  Resp: 18 (02/05/25 1440)  BP: 133/82 (02/05/25 1109)  SpO2: (!) 93 % (02/05/25 1109) Vital Signs (24h Range):  Temp:  [97.3 °F (36.3 °C)-98.1 °F (36.7 °C)] 97.6 °F (36.4 °C)  Pulse:  [] 87  Resp:  [15-20] 18  SpO2:  [93 %-95 %] 93 %  BP: (125-171)/(82-94) 133/82     Weight: 48.7 kg (107 lb 5.8 oz)  Body mass index is 18.43 kg/m².    Intake/Output Summary (Last 24 hours) at 2/5/2025 1450  Last data filed at 2/5/2025 0711  Gross per 24 hour   Intake 1182.29 ml   Output 800 ml   Net 382.29 ml         Physical Exam  Constitutional:       Appearance: Normal appearance. She is ill-appearing.   HENT:      Head: Normocephalic and atraumatic.   Eyes:      Extraocular Movements: Extraocular movements intact.      Pupils: Pupils are equal, round, and reactive to light.   Cardiovascular:      Rate and Rhythm: Normal rate and regular rhythm.   Pulmonary:      Comments: L breast mass  Skin:     General: Skin is warm and dry.      Coloration: Skin is not jaundiced.   Neurological:      General: No focal deficit present.      Mental Status: She is alert and oriented to person, place, and time.             Significant Labs: All pertinent labs within the past 24 hours have been reviewed.  CBC:   Recent Labs   Lab 02/04/25  1632 02/05/25  0248   WBC 9.95 11.30   HGB 14.3 13.3   HCT 42.5 40.4    429     CMP:   Recent Labs   Lab 02/04/25  1632 02/04/25  2316 02/05/25  0248    138 139   K 3.3* 4.0 4.5    104 107   CO2 22* 21* 19*   * 142* 128*   BUN 14 11 10   CREATININE 0.8 0.8 0.7   CALCIUM 12.9* 11.3* 10.9*   PROT 7.2 7.4 6.9  "  ALBUMIN 3.2* 3.3* 3.1*   BILITOT 0.6 0.6 0.6   ALKPHOS 147 144 130   AST 51* 55* 52*   ALT 8* 9* 6*   ANIONGAP 13 13 13       Significant Imaging: I have reviewed all pertinent imaging results/findings within the past 24 hours.    Assessment and Plan     Hypercalcemia of malignancy  Hypercalcemia is likely due to Malignancy. The patient has the following symptoms due to their hypercalcemia: weakness. Their most recent calcium and albumin results are listed below.  Recent Labs     02/04/25  1632 02/04/25  2316 02/05/25  0248   CALCIUM 12.9* 11.3* 10.9*   ALBUMIN 3.2* 3.3* 3.1*     Plan  - Obtain the following labs to work up the cause of hypercalcemia: PTHrP No results found for: "PTHRP" .   - Treat the hypercalcemia with: IV fluids ordered at a rate of 100 ml/hr.   - The patient's hypercalcemia is improving.   - S/p 1 dose of calcitonin     Metastatic cancer  Heme/Onc consult  Does not have an official diagnosis, pending IR consult for biopsy        VTE Risk Mitigation (From admission, onward)           Ordered     enoxaparin injection 40 mg  Daily         02/04/25 1834     IP VTE HIGH RISK PATIENT  Once         02/04/25 1834     Place sequential compression device  Until discontinued         02/04/25 1834                    Discharge Planning   DRU: 2/7/2025     Code Status: Full Code   Medical Readiness for Discharge Date:                            Angeli Fay MD  Department of Hospital Medicine   [facility]    "

## 2025-02-05 NOTE — CONSULTS
Interventional Radiology  Consult/History & Physical Note      Reason for Consult: biopsy of breast vs adrenal gland    SUBJECTIVE:     Chief Complaint:  back pain    History of Present Illness:  Nora Torres is a 64 y.o. female with a PMHx of hypertension and hypothyroidism with breast mass noted 6 months ago by patient who was admitted on 2/4 for back pain. Pt with L5 compression fracture receiving steroids.  Interventional Radiology has been consulted for image guided targeted  breast vs adrenal gland  biopsy. Pt has had recent imaging including a CT Chest Abdomen Pelvis on 2/5 which was independently reviewed by Nixon Ag MD and Samia Fitzpatrick PA-C noting L iliac lesion amenable to biopsy. Pt is afebrile and hemodynamically stable. She denies a history of ANGELA requiring nightly CPAP and/or difficulty breathing when lying flat. She does not take any anticoagulants.     Dr. Damian with oncology consulted and will follow up with patient at discharge.  No plans to start inpatient therapy    Review of Systems   Constitutional:  Negative for chills, fever, malaise/fatigue and weight loss.   Respiratory:  Negative for cough and shortness of breath.    Cardiovascular:  Negative for chest pain, palpitations and leg swelling.   Gastrointestinal:  Negative for abdominal pain, diarrhea, nausea and vomiting.   Genitourinary:  Negative for dysuria and flank pain.   Musculoskeletal:  Positive for back pain. Negative for myalgias.   Neurological:  Negative for weakness and headaches.       Scheduled Meds:   acetaminophen  650 mg Oral Q6H    ascorbic acid (vitamin C)  500 mg Oral Daily    cyanocobalamin  1,000 mcg Oral Daily    dexAMETHasone injection  6 mg Intravenous Q6H    enoxparin  40 mg Subcutaneous Daily    [START ON 2/6/2025] folic acid 1 mg in 0.9% NaCl 100 mL IVPB  1 mg Intravenous Daily    gabapentin  300 mg Oral BID    levothyroxine  100 mcg Intravenous Daily    LIDOcaine  1 patch Transdermal Q24H    magnesium  oxide  400 mg Oral BID    nicotine  1 patch Transdermal Daily    polyethylene glycol  17 g Oral Daily    propranoloL  10 mg Oral BID    senna  8.6 mg Oral BID    thiamine (B-1) injection  200 mg Intravenous Daily     Continuous Infusions:   0.9% NaCl   Intravenous Continuous 100 mL/hr at 25 0858 New Bag at 25 0858     PRN Meds:  Current Facility-Administered Medications:     cyclobenzaprine, 5 mg, Oral, TID PRN    dextrose 50%, 12.5 g, Intravenous, PRN    dextrose 50%, 25 g, Intravenous, PRN    glucagon (human recombinant), 1 mg, Intramuscular, PRN    glucose, 16 g, Oral, PRN    glucose, 24 g, Oral, PRN    metoclopramide, 5 mg, Intravenous, Q6H PRN    morphine, 4 mg, Intravenous, Q4H PRN    naloxone, 0.02 mg, Intravenous, PRN    ondansetron, 4 mg, Intravenous, Q6H PRN    oxyCODONE-acetaminophen, 1 tablet, Oral, Q4H PRN    polyethylene glycol, 17 g, Oral, Daily PRN    simethicone, 1 tablet, Oral, QID PRN    sodium chloride 0.9%, 10 mL, Intravenous, Q12H PRN    Review of patient's allergies indicates:  No Known Allergies    Past Medical History:   Diagnosis Date    Hypertension     Thyroid disease     hypothyroid     Past Surgical History:   Procedure Laterality Date     SECTION      x2     No family history on file.  Social History     Tobacco Use    Smoking status: Every Day     Current packs/day: 0.90     Average packs/day: 0.9 packs/day for 48.1 years (43.3 ttl pk-yrs)     Types: Cigarettes     Start date:     Tobacco comments:     Pt is a (just under) 1 pk/day cigarette smoker x 48 yrs. Order requested for 21 mg nicotine patch Q day. Handout provided.   Substance Use Topics    Alcohol use: No    Drug use: Not Currently       OBJECTIVE:     Vital Signs (Most Recent)  Temp: 97.6 °F (36.4 °C) (25 1109)  Pulse: 87 (25 1109)  Resp: 18 (25 1440)  BP: 133/82 (25 1109)  SpO2: (!) 93 % (25 1109)    Physical Exam:  Physical Exam  Vitals and nursing note reviewed.  "  Constitutional:       Appearance: Normal appearance. She is ill-appearing.   HENT:      Head: Normocephalic and atraumatic.      Right Ear: External ear normal.      Left Ear: External ear normal.      Nose: Nose normal.   Eyes:      Extraocular Movements: Extraocular movements intact.   Cardiovascular:      Rate and Rhythm: Normal rate.      Pulses: Normal pulses.   Pulmonary:      Effort: Pulmonary effort is normal.   Abdominal:      General: Abdomen is flat.   Musculoskeletal:      Cervical back: Normal range of motion and neck supple.   Skin:     General: Skin is warm and dry.   Neurological:      General: No focal deficit present.      Mental Status: She is alert and oriented to person, place, and time.   Psychiatric:         Mood and Affect: Mood normal.         Behavior: Behavior normal.         Laboratory  I have reviewed all pertinent lab results within the past 24 hours.  CBC:   Recent Labs   Lab 02/05/25 0248   WBC 11.30   RBC 4.77   HGB 13.3   HCT 40.4      MCV 85   MCH 27.9   MCHC 32.9     CMP:   Recent Labs   Lab 02/05/25 0248   *   CALCIUM 10.9*   ALBUMIN 3.1*   PROT 6.9      K 4.5   CO2 19*      BUN 10   CREATININE 0.7   ALKPHOS 130   ALT 6*   AST 52*   BILITOT 0.6     Coagulation: No results for input(s): "LABPROT", "INR", "APTT" in the last 168 hours.  Microbiology Results (last 7 days)       Procedure Component Value Units Date/Time    Blood culture [[id number]] Collected: 02/05/25 0723    Order Status: Sent Specimen: Blood Updated: 02/05/25 1346    Blood culture [[id number]] Collected: 02/05/25 0722    Order Status: Sent Specimen: Blood Updated: 02/05/25 1346            ASA/Mallampati  ASA: 3  Mallampati: 2    Imaging:  Recent imaging studies including  CT Chest Abdomen Pelvis  on 2/5 which was independently reviewed by Nixon Ag MD and Samia Fitzpatrick PA-C noting L iliac lesion amenable to biopsy.     ASSESSMENT/PLAN:     Assessment:  64 y.o. female with a PMHx of " hypertension and hypothyroidism who has been referred to IR for CT guided targeted biopsy.  In discussion with Dr. Damian with oncology and Dr. Ag with IR will proceed with L iliac bone biopsy, likely higher yield than adrenal.  No concerns with timing of biopsy and patient receiving steroids for compression fracture.  In regards to breast, recommend referral to breast imaging as outpatient to get a mammogram, breast US, and breast lesion biopsy.    The procedure was discussed in great detail with the patient including thorough explanations of the potential risks and benefits of CT guided targeted  LEFT iliace bone  biopsy. Risks include but are not limited to sepsis, severe infection, hemorrhage, damage to surrounding structures and need for additional procedures. The patient is a candidate for CT guided targeted  LEFT iliac bone  biopsy under moderate sedation. Plan discussed with ordering physician and pt who verbalized understanding of the plan and would like to proceed.    Plan:  Will proceed with CT guided targeted  LEFT iliac bone  biopsy OUTPATIENT under moderate sedation.  Results to Dr. Alan Damian.  In regards to breast mass, recommend referral to breast imaging as outpatient to get a mammogram, breast US, and breast lesion biopsy.  Anticoagulation history reviewed (none)  Thank you for the consult. Please contact with questions via [employer] secure chat or [employer].  IR scheduling number is [phone], IR will reach out to the patient    Time spent during patient care today was 75 minutes. This includes time spent before the visit reviewing the chart, discussing case with staff physician and ordering provider, time spent during the face to face patient visit, and time spent after the visit on documentation. Time excludes procedure time.     Samia Fitzpatrick PA-C  Interventional Radiology

## 2025-02-05 NOTE — CONSULTS
ENDOCRINE CONSULT    Date: 2025      REFERRING PROVIDER: Self, Aaareferral    REASON FOR VISIT: high TSH,low T4    HPI: Nora Torres is a 64 y.o. female patient with a history notable for hypothyroidism on thyroid therapy who presents in consultation for management of hypothyroidism     PAST MEDICAL HISTORY:  Patient Active Problem List   Diagnosis    Mild malnutrition    Metastatic cancer    Hypercalcemia of malignancy        PAST SURGICAL HISTORY:  Past Surgical History:   Procedure Laterality Date     SECTION      x2        MEDICATIONS:  Current Facility-Administered Medications   Medication Dose Route Frequency Provider Last Rate Last Admin    0.9% NaCl infusion   Intravenous Continuous Yulia Wolf  mL/hr at 25 0858 New Bag at 25 0858    acetaminophen tablet 650 mg  650 mg Oral Q6H Yulia Wolf MD   650 mg at 25 1222    ascorbic acid (vitamin C) tablet 500 mg  500 mg Oral Daily Yuila Wolf MD   500 mg at 25 0901    cyanocobalamin tablet 1,000 mcg  1,000 mcg Oral Daily Yulia Wolf MD   1,000 mcg at 25 0901    cyclobenzaprine tablet 5 mg  5 mg Oral TID PRN Yulia Wolf MD        dexAMETHasone injection 6 mg  6 mg Intravenous Q6H Yulia Wolf MD   6 mg at 25 1224    dextrose 50% injection 12.5 g  12.5 g Intravenous PRN Yulia Wolf MD        dextrose 50% injection 25 g  25 g Intravenous PRN Yulia Wolf MD        enoxaparin injection 30 mg  30 mg Subcutaneous Daily Angeli Fay MD        [START ON 2025] folic acid tablet 1 mg  1 mg Oral Daily Angeli Fay MD        gabapentin capsule 300 mg  300 mg Oral BID Yulia Wolf MD   300 mg at 25 0901    glucagon (human recombinant) injection 1 mg  1 mg Intramuscular PRN Yulia Wolf MD        glucose chewable tablet 16 g  16 g Oral PRN Yulia Wolf MD        glucose chewable tablet 24 g  24 g Oral PRN Yulia Wolf MD         levothyroxine injection 100 mcg  100 mcg Intravenous Daily Yulia Wolf MD   100 mcg at 02/05/25 0903    LIDOcaine 5 % patch 1 patch  1 patch Transdermal Q24H Yulia Wolf MD   1 patch at 02/04/25 1405    magnesium oxide tablet 400 mg  400 mg Oral BID Yulia Wolf MD   400 mg at 02/05/25 0901    metoclopramide injection 5 mg  5 mg Intravenous Q6H PRN Yulia Wolf MD        morphine injection 4 mg  4 mg Intravenous Q4H PRN Yulia Wolf MD   4 mg at 02/05/25 1440    naloxone 0.4 mg/mL injection 0.02 mg  0.02 mg Intravenous PRN Yulia Wolf MD        nicotine 21 mg/24 hr 1 patch  1 patch Transdermal Daily Angeli Fay MD        ondansetron injection 4 mg  4 mg Intravenous Q6H PRN uYlia Wolf MD        oxyCODONE-acetaminophen 5-325 mg per tablet 1 tablet  1 tablet Oral Q4H PRN Demetrice Melendez, PATTIE        polyethylene glycol packet 17 g  17 g Oral Daily PRN Yulia Wolf MD        polyethylene glycol packet 17 g  17 g Oral Daily Angeli Fay MD        propranoloL tablet 10 mg  10 mg Oral BID Yulia Wolf MD   10 mg at 02/05/25 0901    senna tablet 8.6 mg  8.6 mg Oral BID Yulia Wolf MD   8.6 mg at 02/05/25 0901    simethicone chewable tablet 80 mg  1 tablet Oral QID PRN Yulia Wolf MD        sodium chloride 0.9% flush 10 mL  10 mL Intravenous Q12H PRN Yulia Wolf MD        thiamine injection 200 mg  200 mg Intravenous Daily Yulia Wolf MD   200 mg at 02/05/25 0906       ALLERGIES:  Review of patient's allergies indicates:  No Known Allergies    SOCIAL HISTORY:  Social History     Socioeconomic History    Marital status: Single   Tobacco Use    Smoking status: Every Day     Current packs/day: 0.90     Average packs/day: 0.9 packs/day for 48.1 years (43.3 ttl pk-yrs)     Types: Cigarettes     Start date: 1977    Tobacco comments:     Pt is a (just under) 1 pk/day cigarette smoker x 48 yrs. Order requested for 21 mg nicotine patch Q day.  "Handout provided.   Substance and Sexual Activity    Alcohol use: No    Drug use: Not Currently    Sexual activity: Not Currently     Social Drivers of Health     Financial Resource Strain: High Risk (2/5/2025)    Overall Financial Resource Strain (CARDIA)     Difficulty of Paying Living Expenses: Very hard   Food Insecurity: Food Insecurity Present (2/5/2025)    Hunger Vital Sign     Worried About Running Out of Food in the Last Year: Often true     Ran Out of Food in the Last Year: Often true   Transportation Needs: Unmet Transportation Needs (2/5/2025)    TRANSPORTATION NEEDS     Transportation : Yes, it has kept me from medical appointments or from getting my medications.   Physical Activity: Inactive (2/5/2025)    Exercise Vital Sign     Days of Exercise per Week: 0 days     Minutes of Exercise per Session: 0 min   Stress: Stress Concern Present (2/5/2025)    [employer] [place] [employer] - Occupational Stress Questionnaire     Feeling of Stress : To some extent   Housing Stability: High Risk (2/5/2025)    Housing Stability Vital Sign     Unable to Pay for Housing in the Last Year: Yes     Homeless in the Last Year: Yes         FAMILY HISTORY:  No family history on file.      REVIEW OF SYSTEMS:  ROS   Records reviewed    PHYSICAL EXAMINATION:  Vital Signs: BP (!) 150/93   Pulse 81   Temp 97.4 °F (36.3 °C)   Resp 18   Ht 5' 4" (1.626 m)   Wt 48.7 kg (107 lb 5.8 oz)   SpO2 97%   BMI 18.43 kg/m²     Physical Exam  Vitals and nursing note reviewed.   Constitutional:       Appearance: She is ill-appearing.   HENT:      Head: Normocephalic and atraumatic.      Nose: Nose normal.      Mouth/Throat:      Mouth: Mucous membranes are moist.      Pharynx: Oropharynx is clear.   Eyes:      Conjunctiva/sclera: Conjunctivae normal.   Cardiovascular:      Rate and Rhythm: Regular rhythm.      Heart sounds: Normal heart sounds.   Pulmonary:      Breath sounds: Normal breath sounds.   Abdominal:      General: " Bowel sounds are normal.   Musculoskeletal:         General: Normal range of motion.      Cervical back: Normal range of motion and neck supple.   Skin:     General: Skin is warm and dry.   Neurological:      Mental Status: She is alert. Mental status is at baseline.            LABS  Admission on 02/04/2025   Component Date Value Ref Range Status    WBC 02/04/2025 9.95  3.90 - 12.70 K/uL Final    RBC 02/04/2025 5.06  4.00 - 5.40 M/uL Final    Hemoglobin 02/04/2025 14.3  12.0 - 16.0 g/dL Final    Hematocrit 02/04/2025 42.5  37.0 - 48.5 % Final    MCV 02/04/2025 84  82 - 98 fL Final    MCH 02/04/2025 28.3  27.0 - 31.0 pg Final    MCHC 02/04/2025 33.6  32.0 - 36.0 g/dL Final    RDW 02/04/2025 17.5 (H)  11.5 - 14.5 % Final    Platelets 02/04/2025 409  150 - 450 K/uL Final    MPV 02/04/2025 8.7 (L)  9.2 - 12.9 fL Final    Immature Granulocytes 02/04/2025 0.6 (H)  0.0 - 0.5 % Final    Gran # (ANC) 02/04/2025 8.3 (H)  1.8 - 7.7 K/uL Final    Immature Grans (Abs) 02/04/2025 0.06 (H)  0.00 - 0.04 K/uL Final    Comment: Mild elevation in immature granulocytes is non specific and   can be seen in a variety of conditions including stress response,   acute inflammation, trauma and pregnancy. Correlation with other   laboratory and clinical findings is essential.      Lymph # 02/04/2025 1.2  1.0 - 4.8 K/uL Final    Mono # 02/04/2025 0.3  0.3 - 1.0 K/uL Final    Eos # 02/04/2025 0.0  0.0 - 0.5 K/uL Final    Baso # 02/04/2025 0.04  0.00 - 0.20 K/uL Final    nRBC 02/04/2025 0  0 /100 WBC Final    Gran % 02/04/2025 83.8 (H)  38.0 - 73.0 % Final    Lymph % 02/04/2025 12.0 (L)  18.0 - 48.0 % Final    Mono % 02/04/2025 3.2 (L)  4.0 - 15.0 % Final    Eosinophil % 02/04/2025 0.0  0.0 - 8.0 % Final    Basophil % 02/04/2025 0.4  0.0 - 1.9 % Final    Differential Method 02/04/2025 Automated   Final    Sodium 02/04/2025 139  136 - 145 mmol/L Final    Potassium 02/04/2025 3.3 (L)  3.5 - 5.1 mmol/L Final    Chloride 02/04/2025 104  95 - 110  mmol/L Final    CO2 02/04/2025 22 (L)  23 - 29 mmol/L Final    Glucose 02/04/2025 117 (H)  70 - 110 mg/dL Final    BUN 02/04/2025 14  8 - 23 mg/dL Final    Creatinine 02/04/2025 0.8  0.5 - 1.4 mg/dL Final    Calcium 02/04/2025 12.9 (HH)  8.7 - 10.5 mg/dL Final    Comment: Calcium critical result(s) called and verbal readback obtained from   Stephanie Rees RN. by HGL 02/04/2025 17:03      Total Protein 02/04/2025 7.2  6.0 - 8.4 g/dL Final    Albumin 02/04/2025 3.2 (L)  3.5 - 5.2 g/dL Final    Total Bilirubin 02/04/2025 0.6  0.1 - 1.0 mg/dL Final    Comment: For infants and newborns, interpretation of results should be based  on gestational age, weight and in agreement with clinical  observations.    Premature Infant recommended reference ranges:  Up to 24 hours.............<8.0 mg/dL  Up to 48 hours............<12.0 mg/dL  3-5 days..................<15.0 mg/dL  6-29 days.................<15.0 mg/dL      Alkaline Phosphatase 02/04/2025 147  40 - 150 U/L Final    AST 02/04/2025 51 (H)  10 - 40 U/L Final    ALT 02/04/2025 8 (L)  10 - 44 U/L Final    eGFR 02/04/2025 >60  >60 mL/min/1.73 m^2 Final    Anion Gap 02/04/2025 13  8 - 16 mmol/L Final    Magnesium 02/04/2025 2.2  1.6 - 2.6 mg/dL Final    TSH 02/04/2025 40.635 (H)  0.400 - 4.000 uIU/mL Final    Specimen UA 02/04/2025 Urine, Clean Catch   Final    Color, UA 02/04/2025 Yellow  Yellow, Straw, Michelle Final    Appearance, UA 02/04/2025 Hazy (A)  Clear Final    pH, UA 02/04/2025 6.0  5.0 - 8.0 Final    Specific Gravity, UA 02/04/2025 1.020  1.005 - 1.030 Final    Protein, UA 02/04/2025 Trace (A)  Negative Final    Comment: Recommend a 24 hour urine protein or a urine   protein/creatinine ratio if globulin induced proteinuria is  clinically suspected.      Glucose, UA 02/04/2025 Negative  Negative Final    Ketones, UA 02/04/2025 1+ (A)  Negative Final    Bilirubin (UA) 02/04/2025 Negative  Negative Final    Occult Blood UA 02/04/2025 Trace (A)  Negative Final    Nitrite,  UA 02/04/2025 Positive (A)  Negative Final    Urobilinogen, UA 02/04/2025 2.0-3.0 (A)  <2.0 EU/dL Final    Leukocytes, UA 02/04/2025 1+ (A)  Negative Final    Lactate (Lactic Acid) 02/04/2025 2.5 (H)  0.5 - 2.2 mmol/L Final    Comment: Falsely low lactic acid results can be found in samples   containing >=13.0 mg/dL total bilirubin and/or >=3.5 mg/dL   direct bilirubin.      RBC, UA 02/04/2025 0  0 - 4 /hpf Final    WBC, UA 02/04/2025 0  0 - 5 /hpf Final    Bacteria 02/04/2025 Moderate (A)  None-Occ /hpf Corrected    CORRECTED RESULT; previously reported as None on 02/04/2025 at 17:32.    Microscopic Comment 02/04/2025 SEE COMMENT   Final    Comment: Other formed elements not mentioned in the report are not   present in the microscopic examination.       Free T4 02/04/2025 0.49 (L)  0.71 - 1.51 ng/dL Final    Lactate (Lactic Acid) 02/04/2025 3.1 (H)  0.5 - 2.2 mmol/L Final    Comment: Falsely low lactic acid results can be found in samples   containing >=13.0 mg/dL total bilirubin and/or >=3.5 mg/dL   direct bilirubin.      Immunofix Interp. 02/04/2025 SEE COMMENT   Final    Comment: Serum protein electrophoresis and immunofixation results should be   interpreted in clinical context in that some therapeutic agents can   result   in false positive results (example, daratumumab). Correlation with   the   patient s therapeutic regimen is required.  See pathologist's interpretation.      LD 02/04/2025 431 (H)  110 - 260 U/L Final    Results are increased in hemolyzed samples.    Beta-2 Microglobulin 02/04/2025 2.2  0.0 - 2.5 ug/mL Final    Sodium 02/04/2025 138  136 - 145 mmol/L Final    Potassium 02/04/2025 4.0  3.5 - 5.1 mmol/L Final    Chloride 02/04/2025 104  95 - 110 mmol/L Final    CO2 02/04/2025 21 (L)  23 - 29 mmol/L Final    Glucose 02/04/2025 142 (H)  70 - 110 mg/dL Final    BUN 02/04/2025 11  8 - 23 mg/dL Final    Creatinine 02/04/2025 0.8  0.5 - 1.4 mg/dL Final    Calcium 02/04/2025 11.3 (H)  8.7 - 10.5  mg/dL Final    Total Protein 02/04/2025 7.4  6.0 - 8.4 g/dL Final    Albumin 02/04/2025 3.3 (L)  3.5 - 5.2 g/dL Final    Total Bilirubin 02/04/2025 0.6  0.1 - 1.0 mg/dL Final    Comment: For infants and newborns, interpretation of results should be based  on gestational age, weight and in agreement with clinical  observations.    Premature Infant recommended reference ranges:  Up to 24 hours.............<8.0 mg/dL  Up to 48 hours............<12.0 mg/dL  3-5 days..................<15.0 mg/dL  6-29 days.................<15.0 mg/dL      Alkaline Phosphatase 02/04/2025 144  40 - 150 U/L Final    AST 02/04/2025 55 (H)  10 - 40 U/L Final    ALT 02/04/2025 9 (L)  10 - 44 U/L Final    eGFR 02/04/2025 >60  >60 mL/min/1.73 m^2 Final    Anion Gap 02/04/2025 13  8 - 16 mmol/L Final    Sodium 02/05/2025 139  136 - 145 mmol/L Final    Potassium 02/05/2025 4.5  3.5 - 5.1 mmol/L Final    Chloride 02/05/2025 107  95 - 110 mmol/L Final    CO2 02/05/2025 19 (L)  23 - 29 mmol/L Final    Glucose 02/05/2025 128 (H)  70 - 110 mg/dL Final    BUN 02/05/2025 10  8 - 23 mg/dL Final    Creatinine 02/05/2025 0.7  0.5 - 1.4 mg/dL Final    Calcium 02/05/2025 10.9 (H)  8.7 - 10.5 mg/dL Final    Total Protein 02/05/2025 6.9  6.0 - 8.4 g/dL Final    Albumin 02/05/2025 3.1 (L)  3.5 - 5.2 g/dL Final    Total Bilirubin 02/05/2025 0.6  0.1 - 1.0 mg/dL Final    Comment: For infants and newborns, interpretation of results should be based  on gestational age, weight and in agreement with clinical  observations.    Premature Infant recommended reference ranges:  Up to 24 hours.............<8.0 mg/dL  Up to 48 hours............<12.0 mg/dL  3-5 days..................<15.0 mg/dL  6-29 days.................<15.0 mg/dL      Alkaline Phosphatase 02/05/2025 130  40 - 150 U/L Final    AST 02/05/2025 52 (H)  10 - 40 U/L Final    ALT 02/05/2025 6 (L)  10 - 44 U/L Final    eGFR 02/05/2025 >60  >60 mL/min/1.73 m^2 Final    Anion Gap 02/05/2025 13  8 - 16 mmol/L Final     Magnesium 02/05/2025 2.2  1.6 - 2.6 mg/dL Final    WBC 02/05/2025 11.30  3.90 - 12.70 K/uL Final    RBC 02/05/2025 4.77  4.00 - 5.40 M/uL Final    Hemoglobin 02/05/2025 13.3  12.0 - 16.0 g/dL Final    Hematocrit 02/05/2025 40.4  37.0 - 48.5 % Final    MCV 02/05/2025 85  82 - 98 fL Final    MCH 02/05/2025 27.9  27.0 - 31.0 pg Final    MCHC 02/05/2025 32.9  32.0 - 36.0 g/dL Final    RDW 02/05/2025 17.3 (H)  11.5 - 14.5 % Final    Platelets 02/05/2025 429  150 - 450 K/uL Final    MPV 02/05/2025 9.8  9.2 - 12.9 fL Final    Phosphorus 02/05/2025 3.0  2.7 - 4.5 mg/dL Final    Lactate (Lactic Acid) 02/05/2025 2.6 (H)  0.5 - 2.2 mmol/L Final    Comment: Falsely low lactic acid results can be found in samples   containing >=13.0 mg/dL total bilirubin and/or >=3.5 mg/dL   direct bilirubin.                CT Head Without Contrast  Narrative: EXAMINATION:  CT HEAD WITHOUT CONTRAST    CLINICAL HISTORY:  Memory loss;    TECHNIQUE:  Low dose axial images were obtained through the head.  Coronal and sagittal reformations were also performed. Contrast was not administered.    COMPARISON:  09/13/2010    FINDINGS:  The CSF spaces, brain parenchyma, and bony calvarium are intact.There is no evidence of hemorrhage, mass, or acute infarct.    Visualized paranasal sinuses clear.  Left-sided artificial globe noted.  Impression: Normal    Electronically signed by: Zaki Virk Jr  Date:    02/05/2025  Time:    10:38  MRI Spine Cervical-Thoracic-Lumbar W W/O Contrast (XPD)  Narrative: EXAMINATION:  MRI SPINE CERVICAL-THORACIC-LUMBAR W W/O CONTRAST (XPD)    CLINICAL HISTORY:  Metastatic disease evaluation;.    TECHNIQUE:  Technique: sagittal T1, T2 and STIR and axial T1 and T2 imaging of the entirespine without contrast. Additionally axial T1 and sagittal fat sat T1 post contrast imaging of the entire spine. Six ml of Gadavist conrast was infused intravenously.    .    COMPARISON:  None    FINDINGS:  Cervical spine:There are  innumerable metastatic lesions throughout the anterior and posterior elements of the cervical spine.  There is no evidence of pathologic fracture.  Postcontrast enhanced images are degraded by patient motion.  There is some contrast enhancing epidural tumor involving the right anterior and lateral aspect of the exiting C8 nerve root and the left anterior and lateral aspect of the T1 nerve root.  There is no evidence of spinal stenosis.    Spinal cord in the cervical region demonstrates normal signal and caliber.    Thoracic spine:There is widespread metastatic disease throughout the thoracic spine, with pathologic fractures resulting in mild vertebral body height loss at T4 and T9.  There is osseous retropulsion/violation of the posterior vertebral body cortex involving the T4 lesion effacing the ventral epidural fat.  There is also an epidural disease emanating from the T10 vertebral body and both pedicles and posterior elements.    Thoracic spinal cord normal in signal and contour without cord signal abnormality to suggest edema.  No abnormal intrathecal enhancement.    Lumbar Spine: Extensive metastatic disease throughout the anterior and posterior elements with biconcave pathologic fractures through the L4 and L5 vertebral bodies and L4 bony retropulsion.  There is extensive involvement of the spinous processes from L3 through S1 with extensive violation of the cortices resulting ventral and dorsal contrast enhancing tumor from L3 caudally producing the moderate spinal canal stenosis.  There is epidural tumor and or osseous expansion producing right L5 foraminal stenosis and left L4 foraminal stenosis.    There is dural enhancement of the caudal aspect of the thecal sac at the S1-2 levels.    Note is made of extensive metastatic disease involving the sacrum and ilium with cortical disruption and an associated soft tissue mass of the posterior left ilium.  Impression: Widespread metastatic disease throughout the  axial skeleton with pathologic fractures of T4, T9, L4, and L5.  There are areas of epidural tumor at the cervicothoracic junction, T4, T10, and L3 through S1.    Electronically signed by: Zaki Virk Jr  Date:    02/05/2025  Time:    09:17  CT Chest Abdomen Pelvis W W/O Contrast (XPD)  Narrative: EXAMINATION:  CT CHEST ABDOMEN PELVIS W W/O CONTRAST (XPD)    CLINICAL HISTORY:  Metastatic disease evaluation;    TECHNIQUE:  Axial images of the chest, abdomen, and pelvis were acquired before and after the use of 75 cc Emxe145 IV contrast. 30 cc of Omnipaque 350 oral contrast was administered.  Coronal and sagittal reconstructions were also obtained    COMPARISON:  None    FINDINGS:  Thoracic soft tissues: Normal thyroid. Soft tissue mass in the left breast measures 4.6 x 2.7 cm (series 2, image 75).  There is significant wall thickening of the dermis.    Aorta: Thoracic aorta is normal in caliber and contour with no significant calcific atherosclerosis.    Heart: Normal in size. No pericardial effusion. No significant calcific coronary atherosclerosis.    Bailey/Mediastinum: No significant mediastinal, hilar, or axillary lymphadenopathy    Lungs: Trachea and bronchi are patent.  Moderate bilateral pleural effusions.  Mild paraseptal and centrilobular emphysema.  Atelectasis of the lower lobes bilaterally.  1.0 x 0.6 cm nodule in the right upper lobe (series 8, image 113).  A few scattered smaller pulmonary micro nodules.  Interlobular septal thickening with a lower lobe predominance with linear opacities which may represent atelectasis or pneumonia.    Liver: Normal in size and contour.  1.5 x 1.2 cm enhancing lesion in the right hepatic lobe (series 4, image 85).  This becomes isodense on delayed phase imaging.  No other focal hepatic lesions.    Gallbladder: No calcified gallstones.    Bile Ducts: No evidence of dilated ducts.    Pancreas: No mass or peripancreatic fat stranding.    Spleen:  Unremarkable.    Stomach and duodenum: Unremarkable.    Adrenals: 2.5 x 2.2 cm indeterminate left adrenal nodule.    Kidneys/ Ureters: Normal in size and location. Normal enhancement. No hydronephrosis or nephrolithiasis. No ureteral dilatation.    Bladder: No evidence of wall thickening.    Reproductive organs: Partially calcified right adnexal mass measuring 5.9 x 4.3 cm.    Bowel/Mesentery: Multiple fluid-filled loops of small bowel which are minimally dilated measuring up to 3.1 cm.  No discrete transition point is visualized.  Air and stool within the colon.  Normal appendix.  Findings are suggestive of partial small bowel obstruction or ileus.  Colon demonstrates no focal wall thickening.    Lymph nodes: No lymphadenapathy.    Abdominal wall:  Unremarkable.    Vasculature: No aneurysm. Moderate calcific atherosclerosis.    Bones: Diffuse osseous destructive lesions consistent with diffuse metastatic disease involving the spine and pelvis as well as multiple ribs.  Impression: Mass in the left breast concerning for primary malignancy.    Diffuse osseous metastatic disease.    2.5 cm indeterminate left adrenal nodule, possibly metastatic disease.    Indeterminate right adnexal calcified lesion.  Differential considerations include an ovarian mass versus metastatic disease.    1.5 cm enhancing focus which becomes isodense on delayed phase, indeterminate and possibly representing variant perfusion although metastatic disease is not entirely excluded but felt less likely.    1.0 cm nodule in the right upper lobe.    Moderate bilateral pleural effusions.    Electronically signed by: Nawaf Duffy MD  Date:    02/05/2025  Time:    07:19          Problem List Items Addressed This Visit          Oncology    * (Principal) Metastatic cancer    Relevant Orders    Protein electrophoresis, serum    Immunoglobulin free LT chains blood    Immunoglobulins (IgG, IgA, IgM) Quantitative    Cancer antigen 15-3    Cancer antigen  27.29    Inpatient consult to Palliative Care (Completed)     Other Visit Diagnoses       Acute midline low back pain with right-sided sciatica    -  Primary    Relevant Orders    Inpatient consult to Palliative Care (Completed)    Chest pain        Relevant Orders    EKG 12-lead    Other closed fracture of fourth lumbar vertebra, initial encounter        Other closed fracture of fifth lumbar vertebra, initial encounter                 RECOMMENDATION: 64 years old black female with history of hypothyroidism, no taking her medication ,now with uncontrolled hypothyroidism, metastatic diease with hypercalcemia ,she received 100 mcg Levothyroxine IVP,start her levothyroxine 150 mcg oral daily,Thanks for the consult.

## 2025-02-06 NOTE — PLAN OF CARE
Problem: Adult Inpatient Plan of Care  Goal: Plan of Care Review  Outcome: Progressing     Problem: Comorbidity Management  Goal: Blood Pressure in Desired Range  Outcome: Progressing     Problem: Fall Injury Risk  Goal: Absence of Fall and Fall-Related Injury  Outcome: Progressing     Problem: Pain Acute  Goal: Optimal Pain Control and Function  Outcome: Progressing     Problem: Mobility Impairment  Goal: Optimal Mobility  Outcome: Progressing     Problem: Wound  Goal: Optimal Coping  Outcome: Progressing     Problem: Coping Ineffective  Goal: Effective Coping  Outcome: Progressing     Problem: Oncology Care  Goal: Effective Coping  Outcome: Progressing   Prn pain medication given for R leg radiating to back. Purewick hooked up to suction. On room air. Family at bedside, Safety maintained.

## 2025-02-06 NOTE — PT/OT/SLP EVAL
Physical Therapy Evaluation    Patient Name:  Nora Torres   MRN:  [id number]    Recommendations:     Discharge Recommendations: Low Intensity Therapy (with 24/7 supervision/assist from family)   Discharge Equipment Recommendations: bedside commode, bath bench   Barriers to discharge: Decreased caregiver support and requires increased assist with functional mobility, pain    Assessment:     Nora Torres is a 64 y.o. female admitted with a medical diagnosis of Metastatic cancer.  She presents with the following impairments/functional limitations: weakness, impaired endurance, impaired self care skills, impaired functional mobility, impaired sensation, gait instability, impaired balance, decreased coordination, decreased lower extremity function, decreased upper extremity function, pain, impaired skin     Patient seen for physical therapy evaluation on this date.  Patient's family present during session as well as palliative care for part of session.  Patient presents with weakness B LE, R>L, decreased endurance, decreased balance and pain.  Patient performed log roll and SL to sit with mod assist and VC.  Patient transitioned to standing with min assist and was able to ambulate x 10' with RW and min assist, slow pace, shuffling steps.  Patient returns to supine with HOB elevated with mod assist.  Recommending patient to discharge with low intensity therapy and family support.  Also recommending a bedside commode and bath bench for home use.  Full report to follow.    Rehab Prognosis: Fair; patient would benefit from acute skilled PT services to address these deficits and reach maximum level of function.    Recent Surgery: * No surgery found *      Plan:     During this hospitalization, patient to be seen 5 x/week to address the identified rehab impairments via gait training, therapeutic activities, therapeutic exercises, neuromuscular re-education and progress toward the following goals:    Plan of Care Expires:   "03/08/25    Subjective     Chief Complaint: "I am in less pain right now"  Patient/Family Comments/goals: to return to PLOF  Pain/Comfort:  Pain Rating 1: 0/10  Pain Rating Post-Intervention 1: 0/10    Patients cultural, spiritual, Buddhist conflicts given the current situation: no    Living Environment:  Patient lives with sister (works during day) and her three children (9, 17, 17 yo) in [facility], no REBECCA, T/S in bathroom.    Prior to admission, patients level of function was patient has been declining over last few months.  Patient was at independent level a few months ago, then started using a SPC for gait, then progressed to using a RW for gait x very short distances.  Patient does not drive.   Equipment used at home: cane, straight, walker, rolling, wheelchair.  DME owned (not currently used): none.  Upon discharge, patient will have assistance from family (unknown in family can provide 24/7 assist).    Objective:     Communicated with nurse Louise prior to session.  Patient found supine with bed alarm, telemetry, PureWick  upon PT entry to room.    General Precautions: Standard, fall  Orthopedic Precautions:Spinal Precautions  Braces: N/A  Respiratory Status: Room air    Exams:  Cognitive Exam:  Patient is oriented to Person, Place, Time, Situation, and follows multi step commands  Gross Motor Coordination:  limited due to weakness in B LE  Postural Exam:  Patient presented with the following abnormalities:    -       Rounded shoulders  -       Forward head  Sensation:    -       Impaired  reports numbness in posterior R calf  Skin Integrity/Edema:      -       Skin integrity: L breast mass not observed, otherwise no issues.  Atrophy to R leg  -       Edema: None noted   RLE ROM: WFL  RLE Strength: Deficits: Hip: 2+/5  Knee 3-/5  Ankle 3+/5  LLE ROM: WFL  LLE Strength: Deficits: Hip 3+/5, Knee 3+/5 Ankle 4/5    Functional Mobility:  Bed Mobility:     Rolling Left:  moderate assistance  Rolling Right: minimum " assistance  Scooting: moderate assistance  Supine to Sit: moderate assistance  Sit to Supine: moderate assistance  Transfers:     Sit to Stand:  minimum assistance with rolling walker  Gait: Patient was able to ambulate x 10' with RW and min assist, slow pace, shuffling steps  Balance: Seated:  SBA EOB   Standing: requires RW, min assist, unsteady      AM-PAC 6 CLICK MOBILITY  Total Score:14       Treatment & Education:  Patient educated on role of physical therapy and goals.  Patient educated on importance of OOB.  Patient returned to supine with HOB elevated, set up for lunch.      Patient left HOB elevated with all lines intact, call button in reach, bed alarm on, and nurse notified.    GOALS:   Multidisciplinary Problems       Physical Therapy Goals          Problem: Physical Therapy    Goal Priority Disciplines Outcome Interventions   Physical Therapy Goal     PT, PT/OT Progressing    Description: Goals to be met by: 3/8/2025     Patient will increase functional independence with mobility by performin. Supine to sit with Stand-by Assistance  2. Sit to supine with Stand-by Assistance  3. Rolling to Left and Right with Stand-by Assistance.  4. Sit to stand transfer with Stand-by Assistance  5. Bed to chair transfer with Stand-by Assistance using Rolling Walker  6. Gait  x 50 feet with Stand-by Assistance using Rolling Walker.                          DME Justifications:   Nora requires a commode for home use because she is confined to a single room.    History:     Past Medical History:   Diagnosis Date    Hypertension     Thyroid disease     hypothyroid       Past Surgical History:   Procedure Laterality Date     SECTION      x2       Time Tracking:     PT Received On: 25  PT Start Time: 1135     PT Stop Time: 1200  PT Total Time (min): 25 min     Billable Minutes: Evaluation 15 and Gait Training 10      2025

## 2025-02-06 NOTE — PROGRESS NOTES
[facility] Medicine  Progress Note    Patient Name: oNra Torres  MRN: [id number]  Patient Class: IP- Inpatient   Admission Date: 2/4/2025  Length of Stay: 1 days  Attending Physician: Angeli Fay MD  Primary Care Provider: Hernandez Booker MD        Subjective     Principal Problem:Metastatic cancer        HPI:  Ms. Nora Torres is a 64 y.o. 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  The patient was previously admitted to the ED on October 20, 2024, for back pain, at which time an MRI revealed innumerable sclerotic bone lesions, a mild pathologic compression fracture of L5, and mild neural foraminal stenosis at L4/L5 and L5-S1. She was advised to follow up with her PCP, which she did; however, she did not disclose the findings of metastatic cancer.  Since then, the patient reports progressively worsening pain radiating to the right leg. She also notes increasing leg weakness and is unable to walk more than a few steps with a cane. Additionally, she reports a decreased appetite, a 30-pound unintentional weight loss, and reduced oral intake. However, she denies any loss of bowel or bladder control.     In the ED, patient was hypertensive 165/100, tachycardic to 100 , EKGs showed sinus tachycardia.  CT lumbar spine was done which showed innumerable lytic lesions throughout the lumbar spine and pelvis with pathological fracture of L4-L5 .  Labs were significant for hypokalemia 3.3, mildly elevated lactate 2.5, bicarb 22, elevated TSH 40 with low free T4 0.49, hypercalcemia 12.9.  Patient was given dexamethasone, morphine    Overview/Hospital Course:  2/5/25 Heme/Onc consult, recommend biopsy, will consult IR  2/6/25 IR plans for outpatient iliac biopsy    Interval History: Pain is better controlled, due to transportation  concerns requesting inpatient biopsy    Review of Systems   Constitutional:  Negative for chills and fever.   Respiratory:  Positive for shortness of breath.    Musculoskeletal:  Positive for back pain and gait problem.     Objective:     Vital Signs (Most Recent):  Temp: 97.7 °F (36.5 °C) (02/06/25 1117)  Pulse: 74 (02/06/25 1250)  Resp: 20 (02/06/25 1251)  BP: (!) 181/88 (02/06/25 1250)  SpO2: 96 % (02/06/25 1117) Vital Signs (24h Range):  Temp:  [97.4 °F (36.3 °C)-97.7 °F (36.5 °C)] 97.7 °F (36.5 °C)  Pulse:  [74-90] 74  Resp:  [16-20] 20  SpO2:  [92 %-97 %] 96 %  BP: (145-181)/() 181/88     Weight: 48.7 kg (107 lb 5.8 oz)  Body mass index is 18.43 kg/m².    Intake/Output Summary (Last 24 hours) at 2/6/2025 1417  Last data filed at 2/6/2025 0619  Gross per 24 hour   Intake 994.19 ml   Output 550 ml   Net 444.19 ml         Physical Exam  Constitutional:       Appearance: Normal appearance. She is ill-appearing.   HENT:      Head: Normocephalic and atraumatic.   Eyes:      Extraocular Movements: Extraocular movements intact.      Pupils: Pupils are equal, round, and reactive to light.   Cardiovascular:      Rate and Rhythm: Normal rate and regular rhythm.   Pulmonary:      Comments: L breast mass  Skin:     General: Skin is warm and dry.      Coloration: Skin is not jaundiced.   Neurological:      General: No focal deficit present.      Mental Status: She is alert and oriented to person, place, and time.             Significant Labs: All pertinent labs within the past 24 hours have been reviewed.  Blood Culture:   Recent Labs   Lab 02/05/25  0722 02/05/25  0723   LABBLOO No Growth to date No Growth to date     CBC:   Recent Labs   Lab 02/04/25  1632 02/05/25  0248 02/06/25  0303   WBC 9.95 11.30 10.18   HGB 14.3 13.3 12.9   HCT 42.5 40.4 39.2    429 425     CMP:   Recent Labs   Lab 02/04/25  2316 02/05/25  0248 02/06/25  0303    139 141   K 4.0 4.5 4.2    107 114*   CO2 21* 19* 18*   GLU  "142* 128* 115*   BUN 11 10 11   CREATININE 0.8 0.7 0.6   CALCIUM 11.3* 10.9* 10.4   PROT 7.4 6.9 6.4   ALBUMIN 3.3* 3.1* 3.0*   BILITOT 0.6 0.6 0.3   ALKPHOS 144 130 121   AST 55* 52* 40   ALT 9* 6* 6*   ANIONGAP 13 13 9       Significant Imaging: I have reviewed all pertinent imaging results/findings within the past 24 hours.    Assessment and Plan     * Metastatic cancer  Heme/Onc consult  Does not have an official diagnosis, pending IR consult for biopsy      Hypercalcemia of malignancy  Hypercalcemia is likely due to Malignancy. The patient has the following symptoms due to their hypercalcemia: weakness. Their most recent calcium and albumin results are listed below.  Recent Labs     02/04/25  2316 02/05/25  0248 02/06/25  0303   CALCIUM 11.3* 10.9* 10.4   ALBUMIN 3.3* 3.1* 3.0*       Plan  - Obtain the following labs to work up the cause of hypercalcemia: PTHrP No results found for: "PTHRP" .   - Treat the hypercalcemia with: IV fluids ordered at a rate of 100 ml/hr.   - The patient's hypercalcemia is improving.   - S/p 1 dose of calcitonin       VTE Risk Mitigation (From admission, onward)           Ordered     enoxaparin injection 30 mg  Daily         02/05/25 1536     IP VTE HIGH RISK PATIENT  Once         02/04/25 1834     Place sequential compression device  Until discontinued         02/04/25 1834                    Discharge Planning   DRU: 2/7/2025     Code Status: Full Code   Medical Readiness for Discharge Date:   Discharge Plan A: Home Health                Please place Justification for DME        Angeli Fay MD  Department of Hospital Medicine   [place] [facility]    "

## 2025-02-06 NOTE — PT/OT/SLP EVAL
Occupational Therapy   Evaluation    Name: Nora Torres  MRN: [id number]  Admitting Diagnosis: Metastatic cancer  Recent Surgery: * No surgery found *      Recommendations:     Discharge Recommendations: Low Intensity Therapy (w/ 24/7 caregiver assistance)  Discharge Equipment Recommendations:  bedside commode, bath bench  Barriers to discharge:  None    Assessment:     Nora Torres is a 64 y.o. female with a medical diagnosis of Metastatic cancer.  She presents with 10/10 RLE pain and low back pain. Pt declined OOB transfer/activity as a result of her pain. Performance deficits affecting function: weakness, impaired endurance, impaired self care skills, impaired functional mobility, gait instability, impaired balance, decreased lower extremity function, decreased upper extremity function, decreased coordination, decreased safety awareness, pain, decreased ROM, orthopedic precautions.      Rehab Prognosis: Fair; patient would benefit from acute skilled OT services to address these deficits and reach maximum level of function.       Plan:     Patient to be seen 3 x/week to address the above listed problems via self-care/home management, therapeutic activities, therapeutic exercises  Plan of Care Expires: 03/06/25  Plan of Care Reviewed with: patient, family, caregiver    Subjective     Chief Complaint: 10/10 low back and RLE pain.  Patient/Family Comments/goals: pt declining OOB activity, BSC use due to pain.    Occupational Profile:  Living Environment: pt lives with sister in [facility] with no steps; t/s combo.  Previous level of function: assisted w/ bathing and dressing via sister; ambulated w/ HHA/ Rw w/ assist.   Roles and Routines: sedentary 2/2 weakness, pain. Pt has cancer.  Equipment Used at Home: walker, rolling, wheelchair, cane, straight  Assistance upon Discharge: family    Pain/Comfort:  Pain Rating 1: 10/10  Location - Side 1: Right  Location - Orientation 1: generalized  Location 1: leg  Pain Addressed  1: Reposition, Distraction, Pre-medicate for activity  Pain Rating Post-Intervention 1: 10/10  Pain Rating 2: 10/10  Location - Side 2: Bilateral  Location - Orientation 2: lower  Location 2: back  Pain Addressed 2: Reposition, Distraction, Pre-medicate for activity, Cessation of Activity  Pain Rating Post-Intervention 2: 10/10    Patients cultural, spiritual, Mandaeism conflicts given the current situation: no    Objective:     Communicated with: nurse prior to session.  Patient found HOB elevated with bed alarm, telemetry, PureWick upon OT entry to room.    General Precautions: Standard, fall  Orthopedic Precautions: spinal precautions (pathological L4-L5 compression fracture)  Braces: N/A  Respiratory Status: Room air    Occupational Performance:    Bed Mobility:    Patient completed Rolling/Turning to Right with stand by assistance and vc log roll tech  Patient completed Scooting/Bridging with minimum assistance    Functional Mobility/Transfers:  Pt declined due to pain  Functional Mobility: same as above    Activities of Daily Living:  Feeding:  independence .  Grooming: setup HOB elevated    Toileting: purewick      Cognitive/Visual Perceptual:  Cognitive/Psychosocial Skills:     -       Oriented to: Person, Place, and Time   -       Follows Commands/attention:Follows one-step commands  -       Communication: clear/fluent  -       Memory: Poor immediate recall  -       Safety awareness/insight to disability: impaired   -       Mood/Affect/Coping skills/emotional control: Cooperative  Visual/Perceptual:      -Intact grossly    Physical Exam:  Balance:    -       sitting: NT 2/2 pain declined to sit   Dominant hand:    -       right  Upper Extremity Range of Motion:     -       Right Upper Extremity: WFL  -       Left Upper Extremity: WFL except shld  Upper Extremity Strength:    -       Right Upper Extremity: WFL  -       Left Upper Extremity: WFL except shld 2+/5   Strength:    -       Right Upper  Extremity: WFL  -       Left Upper Extremity: WFL    AMPAC 6 Click ADL:  AMPAC Total Score: 20    Treatment & Education:  Purpose of OT and POC    Patient left HOB elevated with all lines intact, call button in reach, bed alarm on, and family present    GOALS:   Multidisciplinary Problems       Occupational Therapy Goals          Problem: Occupational Therapy    Goal Priority Disciplines Outcome Interventions   Occupational Therapy Goal     OT, PT/OT Progressing    Description: Goals to be met by: 3/6/2025     Patient will increase functional independence with ADLs by performing:    UE Dressing with Stand-by Assistance.  LE Dressing with Minimal Assistance.  Grooming while seated with Set-up Assistance.  Toileting from bedside commode with Minimal Assistance for hygiene and clothing management.   Step transfer with Stand by Assistance  Toilet transfer to bedside commode with Stand by Assistance.                         DME Justifications:   Nora requires a commode for home use because she is confined to a single room.    History:     Past Medical History:   Diagnosis Date    Hypertension     Thyroid disease     hypothyroid         Past Surgical History:   Procedure Laterality Date     SECTION      x2       Time Tracking:     OT Date of Treatment: 25  OT Start Time: 1700  OT Stop Time: 1709  OT Total Time (min): 9 min    Billable Minutes:Evaluation 9  Total Time 9    2025

## 2025-02-06 NOTE — PROGRESS NOTES
Individual Follow-Up Form    2/6/2025    Quit Date: To be determined    Clinical Status of Patient: Inpatient    Length of Service: 30 minutes    Comments: Smoking cessation education folow-up: Pt again denies nicotine withdrawal symptoms with patc in use. Order requested upon discharge for 21 mg nicotine patch Q day. Will follow up as needed.     Diagnosis: F17.210

## 2025-02-06 NOTE — PLAN OF CARE
Problem: Occupational Therapy  Goal: Occupational Therapy Goal  Description: Goals to be met by: 3/6/2025     Patient will increase functional independence with ADLs by performing:    UE Dressing with Stand-by Assistance.  LE Dressing with Minimal Assistance.  Grooming while seated with Set-up Assistance.  Toileting from bedside commode with Minimal Assistance for hygiene and clothing management.   Step transfer with Stand by Assistance  Toilet transfer to bedside commode with Stand by Assistance.    Outcome: Progressing

## 2025-02-06 NOTE — PLAN OF CARE
"0955  Hospfu appt scheduled for the pt with Dr Hernandez Booker ([facility] PCP) on 2/13/2025 at 1150 as requested. Information added to the pt's discharge paperwork.     Message sent to the schedulers requesting a hospfu appt with Dr Damian (hem/onc). Awaiting response.        02/06/25 1030   Rounds   Attendance Provider;Nurse    Discharge Plan A Home with family   Why the patient remains in the hospital Requires continued medical care     1030  CM was informed by Dr Fay that the pt is not medically stable to discharge home today.     Pt was admitted with metastatic cancer & continues to be followed by hem/onc, rad/onc, pall care, endo, wound care, & dty. PT/OT order noted. Awaiting recs.     Per IR note 2/5/2025, "Will proceed with CT guided targeted  LEFT iliac bone  biopsy OUTPATIENT under moderate sedation. In regards to breast mass, recommend referral to breast imaging as outpatient to get a mammogram, breast US, and breast lesion biopsy".     1155  CM was informed by  Karlie of a hospfu appt scheduled for the patient with Dr Damian (hem/onc) on 2/14/2025 at 1100. Information added to the patient's discharge paperwork.     1450  PT rec HH services following discharge. Referrals sent via [employer]. Awaiting response.       Will continue to follow.  "

## 2025-02-06 NOTE — CONSULTS
Palliative Medicine  Consult Note       Patient Name: Nora Torres   MRN: [id number]   Admission Date: 2/4/2025   Hospital Length of Stay: 1   Attending Provider: Angeli Fay MD   Consulting Provider: Clive Rivera Jr, MD  Primary Care Physician: Hernandez Booker MD   Principal Problem: Metastatic cancer     Patient information was obtained from patient, relative(s), past medical records, and primary team.        Consults       Assessment/Plan:      Palliative care has been consulted for goals of care.     Palliative Care Encounter:  Impression:    64F with stage IV malignancy of presumed left breast primary, presented in 10/2024 with stage IV disease (pathologic L5 fx POA) and was rec'd for urgent onc followup but pt is in denial, amotivational, possibly with an underlying benign personality disorder or mild cognitive impairment. Presented for acute on chronic low back pain with overwhelming diffuse osseous mets and remaining treatment options are few given ECOG 3 status but pt could likely tolerate estrogen blockade or trastuzumab (no signs of structural heart disease).    Plan is for outpt IR biopsy and initial onc visit to discuss path results with adjunct symptom mgmt by palliative clinic. Anticipate pt will transition to hospice within 6 months. If there are no cancer directed treatments available I see no rational alternative to home hospice though pt is quite likely to be resistant given her aversion to medical contact thus far.    Advance Care Planning   Advance Directives:   Living Will: No    [place]POST: No    Do Not Resuscitate Status: No    Medical Power of : Yes    Agent's Name:  Herlinda oTrres (sister)   Agent's Contact Number:  [phone]    Decision Making:  Patient answered questions and Family answered questions  Goals of Care: The patient endorses that what is most important right now is to focus on avoiding the hospital, remaining as independent as possible, symptom/pain control, and  improvement in condition but with limits to invasive therapies    Accordingly, we have decided that the best plan to meet the patient's goals includes continuing with treatment       - Prior experience with serious illness: no  -The patient has not previously engaged in advance care planning or GOC discussions  - Insight/Understanding of illness: fair, pt now accepts that she has stage IV disease and accepts medical assistance, unfortunately pt's disease is now in the end stages and remaining treatment options are unlikely to extend her lifespan    Life Limiting Diagnosis:  Metastatic malignancy to bone and likely adrenals, NOS; clinically suspect left breast primary adenoCA    -Prognosis-Time and potential for recovery:   ~3 months with realistic short term rehab potential based on PT eval witnessed today    -Functional status: poor.  Pt was not able to manage ADLs  -Dementia diagnosis no      Symptom Management:  -Pain: yes, left lower chest    # Cancer related pain  - pt is opioid naive and <65 with healthy kidneys, no dysphagia  - agree with standing APAP and PRN Flexeril  - increased gabapentin 300mg bid -> tid  - recommend resuming palliative dexamethasone, 2mg daily    # Opioid Stewardship  - OME use this admit is 12-30 OMEs /day  - current PRN oxycodone dose is appropriate; broadened indication to mod/severe pain; please discharge with a one week supply and writer will assume primary mgmt or opioid rx's once pt is discharged as I have examined her in person  - DC'd IV morphine as an unnecessary barrier to discharge, can use one time orders if necessary but pt can swallow and is not vomiting    # Metastatic malignancy NOS  - Consider staging MRI Brain w/+w/o contrast this admission given impaired coordination suspicious for cerebellar involvement & mild cognitive impairment with unclear baseline  - adrenal biopsy would formally be highest yield for staging + typing though as above there is no doubt pt has  stage IV disease and a breast biopsy may well be adequate to determine whether pt qualifies for targeted therapies  - defer to onc and IR expertise    -Dyspnea no      -Anxiety/Depression yes      -Constipation: no      -Anorexia:yes      Summary of recommendations and follow up plan:  -Most important goals at this time: functional recovery, palliative cancer directed treatments  -Code status: Full Code   -Disposition: home with home PT and close outpt oncology + palliative followup    The above recommendations communicated directly to primary team on 2/6/25.    Thank you for your consult. I will follow-up with patient. Please contact us if you have any additional questions.       Subjective:     Chief Complaint:   Chief Complaint   Patient presents with    Back Pain     Chronic lower back pain radiating to right leg x 6 months. No h/o injury. Taking Gabapentin without relief. Presents awake, alert. No distress.      64F with clinical dx of stage IV adenocarcinoma of the left breast first noted in 10/2024 when pt presented for acute back pain and was found to have a pathologic L5 vertebral fracture and multiple lytic bone lesions highly concerning for an advanced metastatic malignancy. Physical exam grossly positive for a fungating left breast mass in a background of peau d'orange skin changes.     Pt was discharged to home with a plan for urgent outpt oncology eval however pt did not attend nor did she inform her family of her dx. Pt remained firmly in denial and in terminal decline, losing appetite and ambulation, with family members encouraging her to seek medical attention but unaware of the gravity of her suspected diagnosis.    Pt resurfaced on 2/4/25 when she presented to ED complaining of worsening back pain and leg weakness R>L associated with anorexia and a 30lb unintended weight loss over 90 days. Arrived hypertensive, tachycardic, with mild metabolic acidosis and severe hyperCa (12.9 uncorrected). CT  imaging survey revealed innumerable osseous mets all progressing from last study, a new left adrenal mass presumed metastatic in origin, and steady progression of the presumed left breast primary.    Started on dexamethasone and PRN morphine, admitted to  service for oncology eval.    Tumor histology remains unknown and per discussion with IR she is recommended for outpt breast vs adrenal biopsy for tissue dx; there is no question that pt has stage IV disease. Pt did ambulate with assist today and her PS is ECOG 3.     Given her debility and cachexia I do NOT anticipate that pt would have a net clinical benefit from conventional cytotoxic chemotherapy nor does she have any dominant bone lesion to employ focal RT. Most optimistically pt will have an ER+ or Her2+ tumor that may respond to antiestrogens tx or trastuzumab. Pt's prognosis may well be fixed and she is hospice qualified at any time. She is within her rights to receive palliative cancer directed treatments if available but if pt has triple negative disease we are out of options and writer will strongly recommend transitioning to home hospice ASAP.    Pt expresses regret over delaying care and her sisters are at bedside. We discussed her poor remaining options and prognosis of ~3 months in my assessment if this disease continues its usual course, perhaps extended by 60-90 days with a good response to targeted palliative cancer therapies as above. Sisters are pre-grieving appropriately but resolute in their desire to support pt. Deferred code status discussion today to preserve rapport, will introduce this topic with pt tomorrow.    Please place a referral to writer for outpt palliative clinic followup.    Review of Symptoms      Symptom Assessment (ESAS 0-10 Scale)  Pain:  0  Dyspnea:  0  Anxiety:  0  Nausea:  0  Depression:  0  Anorexia:  0  Fatigue:  0  Insomnia:  0  Restlessness:  0  Agitation:  0     CAM / Delirium:  Negative  Constipation:   Negative  Diarrhea:  Negative    Anxiety:  Is nervous/anxious    ECOG Performance Status ndGndrndanddndend:nd nd2nd Living Arrangements:  Lives in home and Lives alone    Psychosocial/Cultural:   See Palliative Psychosocial Note: No  Social Issues Identified: Coping deficit pt/family, New Diagnosis/Trauma, Mental Health, and Capacity/Surrogate Questions  Bereavement Risk: Yes: Close or dependent relationship to the  person, Social isolation or loss of a support system or friendships, Past history of depression, separation anxiety or post-traumatic stress disorder (PTSD) , and Identified: work/home, financial, intimacy, and caregiver concerns   Caregiver Needs Discussed. Caregiver Distress: Yes: Need for respite, Issues of guilt, Intensity of family caregiving, Caregiver Burnout Risk, and Caregiver support and community resources discussed.    Cultural: Minimal medical contact, no coping mechanisms, detached and not showing up as an outpt; will need constant encouragement from relatives to appreciate benefits from any course of treatment  **Primary  to Follow**  Palliative Care  Consult: No    Spiritual:  F - Tamara and Belief:  Mosque  I - Importance:  No impact on care  C - Community:  Home Faith  A - Address in Care:  Pastoral f/u PRN     Time-Based Charting:  Yes  Chart Review: 22 minutes  Face to Face: 16 minutes  Symptom Assessment: 11 minutes  Coordination of Care: 19 minutes  Discharge Plannin minutes  Advance Care Plannin minutes  Goals of Care: 28 minutes    Total Time Spent: 130 minutes          ROS:  Review of Systems   Constitutional:  Positive for activity change, appetite change, fatigue and unexpected weight change.   Cardiovascular:  Positive for chest pain.   Musculoskeletal:  Positive for back pain, gait problem, leg pain and myalgias.   Neurological:  Positive for weakness.   Psychiatric/Behavioral:  Positive for sleep disturbance. The patient is nervous/anxious.       Past Medical History:   Diagnosis Date    Hypertension     Thyroid disease     hypothyroid     Past Surgical History:   Procedure Laterality Date     SECTION      x2     No family history on file.      Review of patient's allergies indicates:  No Known Allergies    Medications:    Current Facility-Administered Medications:     acetaminophen tablet 650 mg, 650 mg, Oral, Q6H, Yulia Wolf MD, 650 mg at 25 1310    ascorbic acid (vitamin C) tablet 500 mg, 500 mg, Oral, Daily, Yulia Wolf MD, 500 mg at 25 0919    cyanocobalamin tablet 1,000 mcg, 1,000 mcg, Oral, Daily, Yulia Wolf MD, 1,000 mcg at 25 0920    cyclobenzaprine tablet 5 mg, 5 mg, Oral, TID PRN, Yulia Wolf MD, 5 mg at 25 1005    dextrose 50% injection 12.5 g, 12.5 g, Intravenous, PRN, Yulia Wolf MD    dextrose 50% injection 25 g, 25 g, Intravenous, PRN, Yulia Wolf MD    enoxaparin injection 30 mg, 30 mg, Subcutaneous, Daily, Angeli Fay MD, 30 mg at 25 171    folic acid tablet 1 mg, 1 mg, Oral, Daily, Angeli Fay MD, 1 mg at 25    gabapentin capsule 300 mg, 300 mg, Oral, BID, Yulia Wolf MD, 300 mg at 25 09    glucagon (human recombinant) injection 1 mg, 1 mg, Intramuscular, PRN, Yulia Wolf MD    glucose chewable tablet 16 g, 16 g, Oral, PRN, Yulia Wolf MD    glucose chewable tablet 24 g, 24 g, Oral, PRN, Yulia Wolf MD    levothyroxine tablet 150 mcg, 150 mcg, Oral, Before breakfast, Angeli Fay MD, 150 mcg at 25 1005    LIDOcaine 5 % patch 1 patch, 1 patch, Transdermal, Q24H, Yulia Wolf MD, 1 patch at 25 1713    magnesium oxide tablet 400 mg, 400 mg, Oral, BID, Yulia Wolf MD, 400 mg at 25 0920    metoclopramide injection 5 mg, 5 mg, Intravenous, Q6H PRN, Yulia Wolf MD    morphine injection 4 mg, 4 mg, Intravenous, Q4H PRN, Yulia Wlof MD, 4 mg at 25 1258     naloxone 0.4 mg/mL injection 0.02 mg, 0.02 mg, Intravenous, PRN, Yulia Wolf MD    nicotine 21 mg/24 hr 1 patch, 1 patch, Transdermal, Daily, Angeli Fay MD, 1 patch at 02/06/25 0920    ondansetron injection 4 mg, 4 mg, Intravenous, Q6H PRN, Yulia Wolf MD    oxyCODONE-acetaminophen 5-325 mg per tablet 1 tablet, 1 tablet, Oral, Q4H PRN, Demetrice Melendez NP, 1 tablet at 02/05/25 2018    polyethylene glycol packet 17 g, 17 g, Oral, Daily, Angeli Fay MD, 17 g at 02/06/25 1310    propranoloL tablet 10 mg, 10 mg, Oral, BID, Yulia Wolf MD, 10 mg at 02/06/25 0920    senna tablet 8.6 mg, 8.6 mg, Oral, BID, Yulia Wolf MD, 8.6 mg at 02/05/25 2018    simethicone chewable tablet 80 mg, 1 tablet, Oral, QID PRN, Yulia Wolf MD    sodium chloride 0.9% flush 10 mL, 10 mL, Intravenous, Q12H PRN, Yulia Wolf MD    thiamine injection 200 mg, 200 mg, Intravenous, Daily, Yulia Wolf MD, 200 mg at 02/06/25 1009         Objective:      Physical Exam:  Vitals: Temp: 97.7 °F (36.5 °C) (02/06/25 1117)  Pulse: 74 (02/06/25 1250)  Resp: 20 (02/06/25 1251)  BP: (!) 181/88 (02/06/25 1250)  SpO2: 96 % (02/06/25 1117)    Physical Exam  Constitutional:       General: She is awake.      Appearance: She is cachectic. She is ill-appearing (chronically). She is not toxic-appearing or diaphoretic.   HENT:      Head: Normocephalic and atraumatic.      Comments: Temporal wasting     Mouth/Throat:      Mouth: Mucous membranes are moist.      Pharynx: Oropharynx is clear.   Eyes:      Extraocular Movements: Extraocular movements intact.      Pupils: Pupils are equal, round, and reactive to light.   Cardiovascular:      Rate and Rhythm: Normal rate and regular rhythm.      Pulses: Normal pulses.      Heart sounds: No murmur heard.  Pulmonary:      Effort: Pulmonary effort is normal.      Breath sounds: Normal breath sounds. No wheezing or rales.   Chest:      Chest wall: Mass and tenderness (left  lower chest wall) present.   Breasts:     Left: Swelling, mass, skin change and tenderness present. No inverted nipple.   Abdominal:      General: Abdomen is flat. There is no distension.      Palpations: Abdomen is soft.      Tenderness: There is no abdominal tenderness.   Musculoskeletal:         General: Tenderness (RLE) present. No swelling or deformity. Normal range of motion.   Skin:     General: Skin is warm and dry.   Neurological:      General: No focal deficit present.      Mental Status: She is alert.      Cranial Nerves: No cranial nerve deficit.      Sensory: No sensory deficit.      Motor: Weakness present.      Coordination: Coordination abnormal (handwriting shaky and scrawled).      Deep Tendon Reflexes: Reflexes normal.   Psychiatric:         Attention and Perception: Attention and perception normal.         Mood and Affect: Mood normal. Affect is inappropriate (childish, detached).         Speech: Speech normal.         Behavior: Behavior normal. Behavior is cooperative.         Thought Content: Thought content normal.         Cognition and Memory: Cognition and memory normal.         Judgment: Judgment normal.          Labs:   Creatinine   Date Value Ref Range Status   02/06/2025 0.6 0.5 - 1.4 mg/dL Final      Hemoglobin   Date Value Ref Range Status   02/06/2025 12.9 12.0 - 16.0 g/dL Final      Albumin   Date Value Ref Range Status   02/06/2025 3.0 (L) 3.5 - 5.2 g/dL Final   02/05/2025 3.1 (L) 3.5 - 5.2 g/dL Final   02/04/2025 3.3 (L) 3.5 - 5.2 g/dL Final          Imaging:   Imaging Results              CT Chest Abdomen Pelvis W W/O Contrast (XPD) (Final result)  Result time 02/05/25 07:19:00      Final result by Nawaf Duffy MD (02/05/25 07:19:00)                   Impression:      Mass in the left breast concerning for primary malignancy.    Diffuse osseous metastatic disease.    2.5 cm indeterminate left adrenal nodule, possibly metastatic disease.    Indeterminate right adnexal  calcified lesion.  Differential considerations include an ovarian mass versus metastatic disease.    1.5 cm enhancing focus which becomes isodense on delayed phase, indeterminate and possibly representing variant perfusion although metastatic disease is not entirely excluded but felt less likely.    1.0 cm nodule in the right upper lobe.    Moderate bilateral pleural effusions.      Electronically signed by: Nawaf Duffy MD  Date:    02/05/2025  Time:    07:19               Narrative:    EXAMINATION:  CT CHEST ABDOMEN PELVIS W W/O CONTRAST (XPD)    CLINICAL HISTORY:  Metastatic disease evaluation;    TECHNIQUE:  Axial images of the chest, abdomen, and pelvis were acquired before and after the use of 75 cc [id number] IV contrast. 30 cc of Omnipaque 350 oral contrast was administered.  Coronal and sagittal reconstructions were also obtained    COMPARISON:  None    FINDINGS:  Thoracic soft tissues: Normal thyroid. Soft tissue mass in the left breast measures 4.6 x 2.7 cm (series 2, image 75).  There is significant wall thickening of the dermis.    Aorta: Thoracic aorta is normal in caliber and contour with no significant calcific atherosclerosis.    Heart: Normal in size. No pericardial effusion. No significant calcific coronary atherosclerosis.    Bailey/Mediastinum: No significant mediastinal, hilar, or axillary lymphadenopathy    Lungs: Trachea and bronchi are patent.  Moderate bilateral pleural effusions.  Mild paraseptal and centrilobular emphysema.  Atelectasis of the lower lobes bilaterally.  1.0 x 0.6 cm nodule in the right upper lobe (series 8, image 113).  A few scattered smaller pulmonary micro nodules.  Interlobular septal thickening with a lower lobe predominance with linear opacities which may represent atelectasis or pneumonia.    Liver: Normal in size and contour.  1.5 x 1.2 cm enhancing lesion in the right hepatic lobe (series 4, image 85).  This becomes isodense on delayed phase imaging.  No other  focal hepatic lesions.    Gallbladder: No calcified gallstones.    Bile Ducts: No evidence of dilated ducts.    Pancreas: No mass or peripancreatic fat stranding.    Spleen: Unremarkable.    Stomach and duodenum: Unremarkable.    Adrenals: 2.5 x 2.2 cm indeterminate left adrenal nodule.    Kidneys/ Ureters: Normal in size and location. Normal enhancement. No hydronephrosis or nephrolithiasis. No ureteral dilatation.    Bladder: No evidence of wall thickening.    Reproductive organs: Partially calcified right adnexal mass measuring 5.9 x 4.3 cm.    Bowel/Mesentery: Multiple fluid-filled loops of small bowel which are minimally dilated measuring up to 3.1 cm.  No discrete transition point is visualized.  Air and stool within the colon.  Normal appendix.  Findings are suggestive of partial small bowel obstruction or ileus.  Colon demonstrates no focal wall thickening.    Lymph nodes: No lymphadenapathy.    Abdominal wall:  Unremarkable.    Vasculature: No aneurysm. Moderate calcific atherosclerosis.    Bones: Diffuse osseous destructive lesions consistent with diffuse metastatic disease involving the spine and pelvis as well as multiple ribs.                                       MRI Spine Cervical-Thoracic-Lumbar W W/O Contrast (XPD) (Final result)  Result time 02/05/25 09:17:45      Final result by Zaki Dorsey Jr., MD (02/05/25 09:17:45)                   Impression:      Widespread metastatic disease throughout the axial skeleton with pathologic fractures of T4, T9, L4, and L5.  There are areas of epidural tumor at the cervicothoracic junction, T4, T10, and L3 through S1.      Electronically signed by: Zaki Virk Jr  Date:    02/05/2025  Time:    09:17               Narrative:    EXAMINATION:  MRI SPINE CERVICAL-THORACIC-LUMBAR W W/O CONTRAST (XPD)    CLINICAL HISTORY:  Metastatic disease evaluation;.    TECHNIQUE:  Technique: sagittal T1, T2 and STIR and axial T1 and T2 imaging of the entirespine  without contrast. Additionally axial T1 and sagittal fat sat T1 post contrast imaging of the entire spine. Six ml of Gadavist conrast was infused intravenously.    .    COMPARISON:  None    FINDINGS:  Cervical spine:There are innumerable metastatic lesions throughout the anterior and posterior elements of the cervical spine.  There is no evidence of pathologic fracture.  Postcontrast enhanced images are degraded by patient motion.  There is some contrast enhancing epidural tumor involving the right anterior and lateral aspect of the exiting C8 nerve root and the left anterior and lateral aspect of the T1 nerve root.  There is no evidence of spinal stenosis.    Spinal cord in the cervical region demonstrates normal signal and caliber.    Thoracic spine:There is widespread metastatic disease throughout the thoracic spine, with pathologic fractures resulting in mild vertebral body height loss at T4 and T9.  There is osseous retropulsion/violation of the posterior vertebral body cortex involving the T4 lesion effacing the ventral epidural fat.  There is also an epidural disease emanating from the T10 vertebral body and both pedicles and posterior elements.    Thoracic spinal cord normal in signal and contour without cord signal abnormality to suggest edema.  No abnormal intrathecal enhancement.    Lumbar Spine: Extensive metastatic disease throughout the anterior and posterior elements with biconcave pathologic fractures through the L4 and L5 vertebral bodies and L4 bony retropulsion.  There is extensive involvement of the spinous processes from L3 through S1 with extensive violation of the cortices resulting ventral and dorsal contrast enhancing tumor from L3 caudally producing the moderate spinal canal stenosis.  There is epidural tumor and or osseous expansion producing right L5 foraminal stenosis and left L4 foraminal stenosis.    There is dural enhancement of the caudal aspect of the thecal sac at the S1-2  levels.    Note is made of extensive metastatic disease involving the sacrum and ilium with cortical disruption and an associated soft tissue mass of the posterior left ilium.                                        CT Lumbar Spine Without Contrast (Final result)  Result time 02/04/25 15:23:49      Final result by Baron Jackson MD (02/04/25 15:23:49)                   Impression:      Innumerable lytic lesions throughout the lumbar spine and pelvis, in keeping with metastatic disease or multiple myeloma.  Associated pathologic fractures at L4 and L5.  Multifocal extra osseous extension of disease with spinal canal and neural foraminal encroachment, which would be better delineated with MRI.    Partially visualized 5.6 cm partially calcified mass lesion in the right adnexal region.    Bilateral pleural effusions.    This report was flagged in Epic as abnormal.      Electronically signed by: Baron Jackson MD  Date:    02/04/2025  Time:    15:23               Narrative:    EXAMINATION:  CT LUMBAR SPINE WITHOUT CONTRAST    CLINICAL HISTORY:  Low back pain, progressive neurologic deficit;    TECHNIQUE:  Low-dose CT images obtained throughout the region of the lumbar spine.  Axial, sagittal and coronal reformations were performed.  Contrast was not administered.    COMPARISON:  Radiograph 02/04/2025.    FINDINGS:  There are innumerable lytic lesions, which affect virtually every vertebral level.  There is replacement of the majority of the L3, L4 and L5 vertebral bodies as well as the posterior elements.  Large lytic masses involving also involve pelvis, particularly the left iliac bone, both sacral ala, in the right acetabulum.  There is evidence of extra osseous extension of disease at multiple sites.  Associated pathologic fractures with mild vertebral height loss at L4 and L5.    Normal sagittal alignment.  No significant spondylolisthesis.    The intervertebral disc heights are fairly well maintained.    No bony  spinal canal stenosis but there is spinal canal encroachment from extra osseous extension of disease at a few levels.  There is also multilevel neural foraminal encroachment.    No intraspinal hematoma.    Bilateral pleural effusions.  5.6 cm heterogeneous partially calcified lesion in the right adnexal region.    Punctate nonobstructing left renal calculus.                                       X-Ray Lumbar Spine Ap And Lateral (Final result)  Result time 02/04/25 13:36:07      Final result by Nawaf Del Angel MD (02/04/25 13:36:07)                   Impression:      Relative to prior radiograph performed 09/16/2024, there is a new moderate compression fracture at L4.  Recommend clinical correlation.  MRI would better characterize.      Electronically signed by: Nawaf Del Angel  Date:    02/04/2025  Time:    13:36               Narrative:    EXAMINATION:  XR LUMBAR SPINE AP AND LATERAL    CLINICAL HISTORY:  back pain;    TECHNIQUE:  AP, lateral and spot images were performed of the lumbar spine.    COMPARISON:  Lumbar spine radiograph performed 09/16/2024.    FINDINGS:  Five non-rib-bearing lumbar-type vertebra.    Relative to prior radiograph performed 09/16/2024, there is a new moderate compression fracture at L4.    Relatively similar mild height loss of the L5 vertebra.    Multilevel degenerative endplate and facet sclerosis.  No acute findings in the visualized portions of the abdomen or pelvis.  No acute findings in the visualized lower chest.                                            I spent a total of 75 minutes on the day of the visit. This includes face to face time in discussion of goals of care, symptom assessment, coordination of care and emotional support.  This also includes non-face to face time preparing to see the patient (eg, review of tests/imaging), obtaining and/or reviewing separately obtained history, documenting clinical information in the electronic or other health record, independently  interpreting results and communicating results to the patient/family/caregiver, or care coordinator.     Additional 55 min time spent on a voluntary advance care planning and /or goals of care discussion, providing emotional support, formulating, and communicating prognosis and exploring burden/benefit of various approaches of treatment.       Thank you for the opportunity to care for this patient and family.       Clive Rivera Jr, MD

## 2025-02-06 NOTE — PROGRESS NOTES
Pharmacist Intervention IV to PO Note    Nora Torres is a 64 y.o. female being treated with IV medication thiamine    Patient Data:    Vital Signs (Most Recent):  Temp: 97.7 °F (36.5 °C) (02/06/25 1117)  Pulse: 74 (02/06/25 1250)  Resp: 20 (02/06/25 1251)  BP: (!) 181/88 (02/06/25 1250)  SpO2: 96 % (02/06/25 1117) Vital Signs (72h Range):  Temp:  [97.3 °F (36.3 °C)-98.1 °F (36.7 °C)]   Pulse:  []   Resp:  [15-20]   BP: (125-181)/()   SpO2:  [92 %-97 %]      CBC:  Recent Labs   Lab 02/04/25  1632 02/05/25  0248 02/06/25  0303   WBC 9.95 11.30 10.18   RBC 5.06 4.77 4.57   HGB 14.3 13.3 12.9   HCT 42.5 40.4 39.2    429 425   MCV 84 85 86   MCH 28.3 27.9 28.2   MCHC 33.6 32.9 32.9     CMP:     Recent Labs   Lab 02/04/25  2316 02/05/25  0248 02/06/25  0303   * 128* 115*   CALCIUM 11.3* 10.9* 10.4   ALBUMIN 3.3* 3.1* 3.0*   PROT 7.4 6.9 6.4    139 141   K 4.0 4.5 4.2   CO2 21* 19* 18*    107 114*   BUN 11 10 11   CREATININE 0.8 0.7 0.6   ALKPHOS 144 130 121   ALT 9* 6* 6*   AST 55* 52* 40   BILITOT 0.6 0.6 0.3       Dietary Orders:  Diet Orders            Diet Adult Regular: Regular starting at 02/06 0925    Dietary nutrition supplements Boost Plus Nutritional Drink - Any flavor starting at 02/05 1800            Based on the following criteria, this patient qualifies for intravenous to oral conversion:  [x] The patients gastrointestinal tract is functioning (tolerating medications via oral or enteral route for 24 hours and tolerating food or enteral feeds for 24 hours).  [x] The patient is hemodynamically stable for 24 hours (heart rate <100 beats per minute, systolic blood pressure >99 mm Hg, and respiratory rate <20 breaths per minute).  [x] The patient shows clinical improvement (afebrile for at least 24 hours and white blood cell count downtrending or normalized). Additionally, the patient must be non-neutropenic (absolute neutrophil count >500 cells/mm3).  [x] For  antimicrobials, the patient has received IV therapy for at least 24 hours.    IV medication thiamine will be changed to oral medication     Pharmacist's Name: Don Booker  Pharmacist's Extension: [phone]

## 2025-02-06 NOTE — PLAN OF CARE
Patient seen for physical therapy evaluation on this date.  Patient's family present during session as well as palliative care for part of session.  Patient presents with weakness B LE, R>L, decreased endurance, decreased balance and pain.  Patient performed log roll and SL to sit with mod assist and VC.  Patient transitioned to standing with min assist and was able to ambulate x 10' with RW and min assist, slow pace, shuffling steps.  Patient returns to supine with HOB elevated with mod assist.  Recommending patient to discharge with low intensity therapy and family support.  Also recommending a bedside commode and bath bench for home use.  Full report to follow.      Problem: Physical Therapy  Goal: Physical Therapy Goal  Description: Goals to be met by: 3/8/2025     Patient will increase functional independence with mobility by performin. Supine to sit with Stand-by Assistance  2. Sit to supine with Stand-by Assistance  3. Rolling to Left and Right with Stand-by Assistance.  4. Sit to stand transfer with Stand-by Assistance  5. Bed to chair transfer with Stand-by Assistance using Rolling Walker  6. Gait  x 50 feet with Stand-by Assistance using Rolling Walker.     Outcome: Progressing

## 2025-02-06 NOTE — SUBJECTIVE & OBJECTIVE
Interval History: Pain is better controlled, due to transportation concerns requesting inpatient biopsy    Review of Systems   Constitutional:  Negative for chills and fever.   Respiratory:  Positive for shortness of breath.    Musculoskeletal:  Positive for back pain and gait problem.     Objective:     Vital Signs (Most Recent):  Temp: 97.7 °F (36.5 °C) (02/06/25 1117)  Pulse: 74 (02/06/25 1250)  Resp: 20 (02/06/25 1251)  BP: (!) 181/88 (02/06/25 1250)  SpO2: 96 % (02/06/25 1117) Vital Signs (24h Range):  Temp:  [97.4 °F (36.3 °C)-97.7 °F (36.5 °C)] 97.7 °F (36.5 °C)  Pulse:  [74-90] 74  Resp:  [16-20] 20  SpO2:  [92 %-97 %] 96 %  BP: (145-181)/() 181/88     Weight: 48.7 kg (107 lb 5.8 oz)  Body mass index is 18.43 kg/m².    Intake/Output Summary (Last 24 hours) at 2/6/2025 1417  Last data filed at 2/6/2025 0619  Gross per 24 hour   Intake 994.19 ml   Output 550 ml   Net 444.19 ml         Physical Exam  Constitutional:       Appearance: Normal appearance. She is ill-appearing.   HENT:      Head: Normocephalic and atraumatic.   Eyes:      Extraocular Movements: Extraocular movements intact.      Pupils: Pupils are equal, round, and reactive to light.   Cardiovascular:      Rate and Rhythm: Normal rate and regular rhythm.   Pulmonary:      Comments: L breast mass  Skin:     General: Skin is warm and dry.      Coloration: Skin is not jaundiced.   Neurological:      General: No focal deficit present.      Mental Status: She is alert and oriented to person, place, and time.             Significant Labs: All pertinent labs within the past 24 hours have been reviewed.  Blood Culture:   Recent Labs   Lab 02/05/25  0722 02/05/25  0723   LABBLOO No Growth to date No Growth to date     CBC:   Recent Labs   Lab 02/04/25  1632 02/05/25  0248 02/06/25  0303   WBC 9.95 11.30 10.18   HGB 14.3 13.3 12.9   HCT 42.5 40.4 39.2    429 425     CMP:   Recent Labs   Lab 02/04/25  2316 02/05/25  0248 02/06/25  0303     139 141   K 4.0 4.5 4.2    107 114*   CO2 21* 19* 18*   * 128* 115*   BUN 11 10 11   CREATININE 0.8 0.7 0.6   CALCIUM 11.3* 10.9* 10.4   PROT 7.4 6.9 6.4   ALBUMIN 3.3* 3.1* 3.0*   BILITOT 0.6 0.6 0.3   ALKPHOS 144 130 121   AST 55* 52* 40   ALT 9* 6* 6*   ANIONGAP 13 13 9       Significant Imaging: I have reviewed all pertinent imaging results/findings within the past 24 hours.

## 2025-02-06 NOTE — ASSESSMENT & PLAN NOTE
"Hypercalcemia is likely due to Malignancy. The patient has the following symptoms due to their hypercalcemia: weakness. Their most recent calcium and albumin results are listed below.  Recent Labs     02/04/25  2316 02/05/25  0248 02/06/25  0303   CALCIUM 11.3* 10.9* 10.4   ALBUMIN 3.3* 3.1* 3.0*       Plan  - Obtain the following labs to work up the cause of hypercalcemia: PTHrP No results found for: "PTHRP" .   - Treat the hypercalcemia with: IV fluids ordered at a rate of 100 ml/hr.   - The patient's hypercalcemia is improving.   - S/p 1 dose of calcitonin   "

## 2025-02-07 NOTE — PT/OT/SLP PROGRESS
"Physical Therapy Treatment    Patient Name:  Nora Torres   MRN:  [id number]    Recommendations:     Discharge Recommendations: Low Intensity Therapy (with 24/7 caregiver assist)  Discharge Equipment Recommendations: bedside commode, bath bench  Barriers to discharge: Decreased caregiver support and requires increased assist with mobility    Assessment:     Nora Torres is a 64 y.o. female admitted with a medical diagnosis of Metastatic cancer.  She presents with the following impairments/functional limitations: weakness, impaired endurance, impaired sensation, impaired self care skills, impaired functional mobility, gait instability, impaired balance, decreased coordination, decreased lower extremity function, decreased safety awareness, pain, decreased ROM, impaired skin, orthopedic precautions     Patient seen for physical therapy session and is making good progress toward PT goals.  Patient performs supine to sit with CGA, sit to stand with CGA and gait with RW x 40' with CGA, slow pace, short step lengths, VC's for safely moving walker.  Patient agrees to sit up in chair at end of session, encouraged to perform B LE exercises.  Continue to recommend low intensity therapy with 24/7 supervision/assist.  Recommending a bedside commode and bath bench for home use.   .    Rehab Prognosis: Fair; patient would benefit from acute skilled PT services to address these deficits and reach maximum level of function.    Recent Surgery: * No surgery found *      Plan:     During this hospitalization, patient to be seen 5 x/week to address the identified rehab impairments via gait training, therapeutic activities, therapeutic exercises, neuromuscular re-education and progress toward the following goals:    Plan of Care Expires:  03/08/25    Subjective     Chief Complaint: "I was in a lot of pain earlier today"  Patient/Family Comments/goals: to return home  Pain/Comfort:  Pain Rating 1: 2/10  Location - Side 1: " Right  Location - Orientation 1: generalized  Location 1: leg  Pain Addressed 1: Reposition, Distraction, Pre-medicate for activity  Pain Rating Post-Intervention 1: 2/10      Objective:     Communicated with nurse prior to session.  Patient found supine with bed alarm, telemetry, PureWick upon PT entry to room.     General Precautions: Standard, fall  Orthopedic Precautions: spinal precautions (L4-5 pathological fracture)  Braces: N/A  Respiratory Status: Room air     Functional Mobility:  Bed Mobility:     Rolling Right: contact guard assistance  Scooting: contact guard assistance  Supine to Sit: contact guard assistance  Transfers:     Sit to Stand:  contact guard assistance with rolling walker  Gait: 40' with RW and CGA, very slow pace, short step lengths, shuffling pattern  Balance: Seated:  SBA at EOB  Standing:  CGA with RW      AM-PAC 6 CLICK MOBILITY  Turning over in bed (including adjusting bedclothes, sheets and blankets)?: 3  Sitting down on and standing up from a chair with arms (e.g., wheelchair, bedside commode, etc.): 3  Moving from lying on back to sitting on the side of the bed?: 3  Moving to and from a bed to a chair (including a wheelchair)?: 3  Need to walk in hospital room?: 3  Climbing 3-5 steps with a railing?: 1  Basic Mobility Total Score: 16       Treatment & Education:  Patient seen for physical therapy session and is making good progress toward PT goals.  Patient performs supine to sit with CGA, sit to stand with CGA and gait with RW x 40' with CGA, slow pace, short step lengths, VC's for safely moving walker.  Patient agrees to sit up in chair at end of session, encouraged to perform B LE exercises.  Continue to recommend low intensity therapy with 24/7 supervision/assist.  Recommending a bedside commode and bath bench for home use.       Patient left up in chair with all lines intact, call button in reach, and nurse notified..    GOALS:   Multidisciplinary Problems       Physical  Therapy Goals          Problem: Physical Therapy    Goal Priority Disciplines Outcome Interventions   Physical Therapy Goal     PT, PT/OT Progressing    Description: Goals to be met by: 3/8/2025     Patient will increase functional independence with mobility by performin. Supine to sit with Stand-by Assistance  2. Sit to supine with Stand-by Assistance  3. Rolling to Left and Right with Stand-by Assistance.  4. Sit to stand transfer with Stand-by Assistance  5. Bed to chair transfer with Stand-by Assistance using Rolling Walker  6. Gait  x 50 feet with Stand-by Assistance using Rolling Walker.                          DME Justifications:  No DME recommended requiring DME justifications    Time Tracking:     PT Received On: 25  PT Start Time: 1129     PT Stop Time: 1153  PT Total Time (min): 24 min     Billable Minutes: Gait Training 15 and Therapeutic Activity 9    Treatment Type: Treatment  PT/PTA: PT     Number of PTA visits since last PT visit: 0     2025

## 2025-02-07 NOTE — PLAN OF CARE
"   02/07/25 0830   Rounds   Attendance Provider;Nurse    Discharge Plan A Home Health   Why the patient remains in the hospital Requires continued medical care       0830  CM was informed by Dr Fay that the pt might be medically stable to discharge home with HH today pending MRI (brain) results & scheduling of out-pt IR iliac bone biopsy.     Pt was admitted with metastatic cancer & continues to be followed by hem/onc, rad/onc, pall care, endo, wound care, dty, & PT/OT. PT/OT rec HH services following discharge.     1025  CM informed DIANNA Gracia w/IR of scheduling for an out-pt iliac bone biopsy & breast imaging needed prior to the pt's dc. Samia instructed this CM to contact [facility] to scheduled the bone biopsy but IR does not perform breast imaging. Message sent to [facility] informing of above. Awaiting response.     1035  No acceptance from previously sent HH referrals. Additional referrals sent via [email or web address]. Awaiting response.     1040  CM was informed by [facility] that the 1st available appointment for the biopsy is on 2/25/2025 & will schedule.     1200  Voicemail message left for the [facility] ([phone]) informing of the out-pt iliac bone biopsy needed & requesting a return call with the time & date of the procedure. Awaiting response.     1210  CM was informed by Johnny with [facility] that the pt has been accepted & that services will start on 2/10/2025. Information added to the pt's discharge paperwork.     1220  Patient resting quietly in bed with her sister, Herlinda Torres ([phone]), at the bedside when CM rounded. CM informed the pt that she has been accepted by [facility] &  that they will be notified of appointments for an out-pt iliac bone biopsy & breast imaging needed following discharge. Both verbalized understanding & agreement. Signed "Pt Choice" form placed in the pt's chart.    1608  Message sent to Dr Fay informing of the scheduled out-pt iliac bone biopsy on " 2/25/2025.      Will continue to follow.

## 2025-02-07 NOTE — PLAN OF CARE
Patient seen for physical therapy session and is making good progress toward PT goals.  Patient performs supine to sit with CGA, sit to stand with CGA and gait with RW x 40' with CGA, slow pace, short step lengths, VC's for safely moving walker.  Patient agrees to sit up in chair at end of session, encouraged to perform B LE exercises.  Continue to recommend low intensity therapy with / supervision/assist.  Recommending a bedside commode and bath bench for home use.         Problem: Physical Therapy  Goal: Physical Therapy Goal  Description: Goals to be met by: 3/8/2025     Patient will increase functional independence with mobility by performin. Supine to sit with Stand-by Assistance  2. Sit to supine with Stand-by Assistance  3. Rolling to Left and Right with Stand-by Assistance.  4. Sit to stand transfer with Stand-by Assistance  5. Bed to chair transfer with Stand-by Assistance using Rolling Walker  6. Gait  x 50 feet with Stand-by Assistance using Rolling Walker.     Outcome: Progressing

## 2025-02-07 NOTE — PT/OT/SLP PROGRESS
Occupational Therapy  Visit Attempt    Patient Name:  Nora Torres   MRN:  [id number]    Patient not seen today secondary to Testing/imaging (xray/CT/MRI).    2/7/2025

## 2025-02-07 NOTE — SUBJECTIVE & OBJECTIVE
Interval History: Had some pain with sitting up    Review of Systems   Constitutional:  Negative for chills and fever.   Respiratory:  Positive for shortness of breath.    Musculoskeletal:  Positive for back pain and gait problem.     Objective:     Vital Signs (Most Recent):  Temp: 97.9 °F (36.6 °C) (02/07/25 0735)  Pulse: 78 (02/07/25 0735)  Resp: 20 (02/07/25 1416)  BP: (!) 145/89 (02/07/25 0735)  SpO2: (!) 94 % (02/07/25 0735) Vital Signs (24h Range):  Temp:  [97.4 °F (36.3 °C)-98.5 °F (36.9 °C)] 97.9 °F (36.6 °C)  Pulse:  [77-90] 78  Resp:  [16-20] 20  SpO2:  [92 %-95 %] 94 %  BP: (124-153)/() 145/89     Weight: 48.7 kg (107 lb 5.8 oz)  Body mass index is 18.43 kg/m².    Intake/Output Summary (Last 24 hours) at 2/7/2025 1507  Last data filed at 2/7/2025 1200  Gross per 24 hour   Intake 236 ml   Output 900 ml   Net -664 ml         Physical Exam  Constitutional:       Appearance: Normal appearance. She is ill-appearing.   HENT:      Head: Normocephalic and atraumatic.   Eyes:      Extraocular Movements: Extraocular movements intact.      Pupils: Pupils are equal, round, and reactive to light.   Cardiovascular:      Rate and Rhythm: Normal rate and regular rhythm.   Pulmonary:      Comments: L breast mass  Skin:     General: Skin is warm and dry.      Coloration: Skin is not jaundiced.   Neurological:      General: No focal deficit present.      Mental Status: She is alert and oriented to person, place, and time.             Significant Labs: All pertinent labs within the past 24 hours have been reviewed.  CBC:   Recent Labs   Lab 02/06/25 0303 02/07/25 0334   WBC 10.18 9.98   HGB 12.9 12.8   HCT 39.2 39.1    424     CMP:   Recent Labs   Lab 02/06/25 0303 02/07/25 0334    140   K 4.2 3.8   * 113*   CO2 18* 18*   * 89   BUN 11 11   CREATININE 0.6 0.7   CALCIUM 10.4 10.9*   PROT 6.4 6.2   ALBUMIN 3.0* 2.9*   BILITOT 0.3 0.3   ALKPHOS 121 130   AST 40 40   ALT 6* 8*   ANIONGAP 9 9        Significant Imaging: I have reviewed all pertinent imaging results/findings within the past 24 hours.

## 2025-02-07 NOTE — ASSESSMENT & PLAN NOTE
"Hypercalcemia is likely due to Malignancy. The patient has the following symptoms due to their hypercalcemia: weakness. Their most recent calcium and albumin results are listed below.  Recent Labs     02/05/25  0248 02/06/25  0303 02/07/25  0334   CALCIUM 10.9* 10.4 10.9*   ALBUMIN 3.1* 3.0* 2.9*       Plan  - Obtain the following labs to work up the cause of hypercalcemia: PTHrP No results found for: "PTHRP" .   - Treat the hypercalcemia with: IV fluids ordered at a rate of 100 ml/hr.   - The patient's hypercalcemia is improving.   - S/p 1 dose of calcitonin   "

## 2025-02-07 NOTE — PT/OT/SLP PROGRESS
Occupational Therapy   Treatment    Name: Nora Torres  MRN: [id number]  Admitting Diagnosis:  Metastatic cancer       Recommendations:     Discharge Recommendations: Low Intensity Therapy (with 24/7 caregiver assist)  Discharge Equipment Recommendations:  bedside commode, bath bench  Barriers to discharge:  None    Assessment:     Nora Torres is a 64 y.o. female with a medical diagnosis of Metastatic cancer. Performance deficits affecting function are weakness, impaired endurance, impaired self care skills, impaired functional mobility, gait instability, impaired balance, decreased coordination, decreased upper extremity function, decreased lower extremity function, pain, decreased safety awareness, impaired coordination, impaired skin, orthopedic precautions, decreased ROM.     Rehab Prognosis:  Fair; patient would benefit from acute skilled OT services to address these deficits and reach maximum level of function.       Plan:     Patient to be seen 3 x/week to address the above listed problems via self-care/home management, therapeutic activities, therapeutic exercises  Plan of Care Expires: 03/06/25  Plan of Care Reviewed with: patient    Subjective     Chief Complaint: RLE pain  Patient/Family Comments/goals: return to PLOF  Pain/Comfort:  Pain Rating 1: 10/10  Location - Side 1: Right  Location 1: leg  Pain Addressed 1: Distraction, Reposition, Cessation of Activity, Nurse notified  Pain Rating Post-Intervention 1: 10/10    Objective:     Communicated with: Letty josue prior to session.  Patient found HOB elevated with bed alarm, telemetry, PureWick upon OT entry to room.    General Precautions: Standard, fall    Orthopedic Precautions:spinal precautions (L4-5 pathological fracture)  Braces: N/A  Respiratory Status: Room air    Bed Mobility:    Patient completed Supine to Sit with minimum assistance  Patient completed Sit to Supine with minimum assistance and with leg lift     Functional  Mobility/Transfers:  N/A 2/2 pain    Activities of Daily Living:  Toileting: purewiestefania      [facility] 6 Click ADL: 17    Treatment & Education:  Educated on purpose/role of OT  Reported significant pain in RLE (from hip down through the leg)  Agreeable to sit EOB to allow nursing to apply lidocaine patch; attempted to remain EOB for further activity, but reported pain was too much to tolerate any further ax  Returned to bed level and positioned for comfort    Patient left HOB elevated with all lines intact, call button in reach, bed alarm on, nsg notified, and family members present    GOALS:   Multidisciplinary Problems       Occupational Therapy Goals          Problem: Occupational Therapy    Goal Priority Disciplines Outcome Interventions   Occupational Therapy Goal     OT, PT/OT Progressing    Description: Goals to be met by: 3/6/2025     Patient will increase functional independence with ADLs by performing:    UE Dressing with Stand-by Assistance.  LE Dressing with Minimal Assistance.  Grooming while seated with Set-up Assistance.  Toileting from bedside commode with Minimal Assistance for hygiene and clothing management.   Step transfer with Stand by Assistance  Toilet transfer to bedside commode with Stand by Assistance.                         DME Justifications:   Nora requires a commode for home use because she is confined to a single room.    Time Tracking:     OT Date of Treatment: 02/07/25  OT Start Time: 1413  OT Stop Time: 1427  OT Total Time (min): 14 min    Billable Minutes:Therapeutic Activity 14    2/7/2025

## 2025-02-07 NOTE — PROGRESS NOTES
[facility] Medicine  Progress Note    Patient Name: Nora Torres  MRN: [id number]  Patient Class: IP- Inpatient   Admission Date: 2/4/2025  Length of Stay: 2 days  Attending Physician: Angeli Fay MD  Primary Care Provider: Hernandez Booker MD        Subjective     Principal Problem:Metastatic cancer        HPI:  Ms. Nora Torres is a 64 y.o. 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  The patient was previously admitted to the ED on October 20, 2024, for back pain, at which time an MRI revealed innumerable sclerotic bone lesions, a mild pathologic compression fracture of L5, and mild neural foraminal stenosis at L4/L5 and L5-S1. She was advised to follow up with her PCP, which she did; however, she did not disclose the findings of metastatic cancer.  Since then, the patient reports progressively worsening pain radiating to the right leg. She also notes increasing leg weakness and is unable to walk more than a few steps with a cane. Additionally, she reports a decreased appetite, a 30-pound unintentional weight loss, and reduced oral intake. However, she denies any loss of bowel or bladder control.     In the ED, patient was hypertensive 165/100, tachycardic to 100 , EKGs showed sinus tachycardia.  CT lumbar spine was done which showed innumerable lytic lesions throughout the lumbar spine and pelvis with pathological fracture of L4-L5 .  Labs were significant for hypokalemia 3.3, mildly elevated lactate 2.5, bicarb 22, elevated TSH 40 with low free T4 0.49, hypercalcemia 12.9.  Patient was given dexamethasone, morphine    Overview/Hospital Course:  2/5/25 Heme/Onc consult, recommend biopsy, will consult IR  2/6/25 IR plans for outpatient iliac biopsy  2/7/25 MRI brain wwo skull lesion    Interval History: Had some pain  with sitting up    Review of Systems   Constitutional:  Negative for chills and fever.   Respiratory:  Positive for shortness of breath.    Musculoskeletal:  Positive for back pain and gait problem.     Objective:     Vital Signs (Most Recent):  Temp: 97.9 °F (36.6 °C) (02/07/25 0735)  Pulse: 78 (02/07/25 0735)  Resp: 20 (02/07/25 1416)  BP: (!) 145/89 (02/07/25 0735)  SpO2: (!) 94 % (02/07/25 0735) Vital Signs (24h Range):  Temp:  [97.4 °F (36.3 °C)-98.5 °F (36.9 °C)] 97.9 °F (36.6 °C)  Pulse:  [77-90] 78  Resp:  [16-20] 20  SpO2:  [92 %-95 %] 94 %  BP: (124-153)/() 145/89     Weight: 48.7 kg (107 lb 5.8 oz)  Body mass index is 18.43 kg/m².    Intake/Output Summary (Last 24 hours) at 2/7/2025 1507  Last data filed at 2/7/2025 1200  Gross per 24 hour   Intake 236 ml   Output 900 ml   Net -664 ml         Physical Exam  Constitutional:       Appearance: Normal appearance. She is ill-appearing.   HENT:      Head: Normocephalic and atraumatic.   Eyes:      Extraocular Movements: Extraocular movements intact.      Pupils: Pupils are equal, round, and reactive to light.   Cardiovascular:      Rate and Rhythm: Normal rate and regular rhythm.   Pulmonary:      Comments: L breast mass  Skin:     General: Skin is warm and dry.      Coloration: Skin is not jaundiced.   Neurological:      General: No focal deficit present.      Mental Status: She is alert and oriented to person, place, and time.             Significant Labs: All pertinent labs within the past 24 hours have been reviewed.  CBC:   Recent Labs   Lab 02/06/25 0303 02/07/25 0334   WBC 10.18 9.98   HGB 12.9 12.8   HCT 39.2 39.1    424     CMP:   Recent Labs   Lab 02/06/25 0303 02/07/25 0334    140   K 4.2 3.8   * 113*   CO2 18* 18*   * 89   BUN 11 11   CREATININE 0.6 0.7   CALCIUM 10.4 10.9*   PROT 6.4 6.2   ALBUMIN 3.0* 2.9*   BILITOT 0.3 0.3   ALKPHOS 121 130   AST 40 40   ALT 6* 8*   ANIONGAP 9 9       Significant Imaging: I  "have reviewed all pertinent imaging results/findings within the past 24 hours.    Assessment and Plan     * Metastatic cancer  Heme/Onc consult  Does not have an official diagnosis, pending IR consult for biopsy      Hypercalcemia of malignancy  Hypercalcemia is likely due to Malignancy. The patient has the following symptoms due to their hypercalcemia: weakness. Their most recent calcium and albumin results are listed below.  Recent Labs     02/05/25  0248 02/06/25  0303 02/07/25  0334   CALCIUM 10.9* 10.4 10.9*   ALBUMIN 3.1* 3.0* 2.9*       Plan  - Obtain the following labs to work up the cause of hypercalcemia: PTHrP No results found for: "PTHRP" .   - Treat the hypercalcemia with: IV fluids ordered at a rate of 100 ml/hr.   - The patient's hypercalcemia is improving.   - S/p 1 dose of calcitonin     Mild malnutrition          VTE Risk Mitigation (From admission, onward)           Ordered     enoxaparin injection 30 mg  Daily         02/05/25 1536     IP VTE HIGH RISK PATIENT  Once         02/04/25 1834     Place sequential compression device  Until discontinued         02/04/25 1834                    Discharge Planning   DRU: 2/7/2025     Code Status: Full Code   Medical Readiness for Discharge Date:   Discharge Plan A: Home Health                Please place Justification for DME        Angeli Fay MD  Department of Hospital Medicine   [facility]    "

## 2025-02-07 NOTE — PLAN OF CARE
Pt is AAOx4, visiting with sisters at start of shift. Pt in good spirits, laughing and enjoying visit. No report of pain at that time. Plan of care reviewed. Pt expressed desire for shower. PCTZara, informed of pt's request. Medications administered per MAR. Safety protocols maintained. Pt encouraged to call with any needs.     Problem: Adult Inpatient Plan of Care  Goal: Optimal Comfort and Wellbeing  Outcome: Progressing     Problem: Pain Acute  Goal: Optimal Pain Control and Function  Outcome: Progressing     Problem: Mobility Impairment  Goal: Optimal Mobility  Outcome: Progressing     Problem: Coping Ineffective  Goal: Effective Coping  Outcome: Progressing     Problem: Oncology Care  Goal: Effective Coping  Outcome: Progressing     Problem: Skin Injury Risk Increased  Goal: Skin Health and Integrity  Outcome: Progressing

## 2025-02-07 NOTE — PLAN OF CARE
Problem: Adult Inpatient Plan of Care  Goal: Plan of Care Review  Outcome: Progressing     Problem: Adult Inpatient Plan of Care  Goal: Optimal Comfort and Wellbeing  Outcome: Progressing     Problem: Comorbidity Management  Goal: Blood Pressure in Desired Range  Outcome: Progressing     Problem: Pain Acute  Goal: Optimal Pain Control and Function  Outcome: Progressing     Problem: Mobility Impairment  Goal: Optimal Mobility  Outcome: Progressing     Problem: Wound  Goal: Optimal Coping  Outcome: Progressing     Problem: Wound  Goal: Improved Oral Intake  Outcome: Progressing     Problem: Wound  Goal: Skin Health and Integrity  Outcome: Progressing    AAo x 4.  Regular diet.  Encouraged to increase oral intake.  Pain meds administered PRN.  Reminded pain meds available.  Multiple family members at bedside showing support.  Patient smiling and conversing with them.  Safety maintained.  Bed alarm on.  Call light within reach.

## 2025-02-07 NOTE — NURSING
Received report from chica Porras the patient lying supine in bed awake and alert, bed locked in low with alarm on and call light in reach at present, instructed to call for assistance. Patient reports pain 3-4 at present.

## 2025-02-08 PROBLEM — J96.01 ACUTE HYPOXEMIC RESPIRATORY FAILURE: Status: ACTIVE | Noted: 2025-01-01

## 2025-02-08 NOTE — NURSING
Assumed care of pt from ROBIN Saenz. Pt siting up in bed visiting with sister and two brothers. Pt in good spirits and reporting pain is manageable at this time. Pt and family excited  to report that pt had been eating well today. Plan of care reviewed with pt and family. Pt encouraged to call with any needs.

## 2025-02-08 NOTE — PT/OT/SLP PROGRESS
Physical Therapy Treatment    Patient Name:  Nora Torres   MRN:  [id number]    Recommendations:     Discharge Recommendations: Low Intensity Therapy (with 24/7 care assistance)  Discharge Equipment Recommendations: bedside commode, bath bench  Barriers to discharge:  increased assistance needed for all functional mobility    Assessment:     Nora Torres is a 64 y.o. female admitted with a medical diagnosis of Metastatic cancer.  She presents with the following impairments/functional limitations: weakness, impaired endurance, impaired self care skills, impaired functional mobility, gait instability, impaired balance, decreased coordination, decreased upper extremity function, decreased lower extremity function, decreased safety awareness, pain, decreased ROM, impaired coordination, impaired fine motor, impaired skin, orthopedic precautions. Pt sat at EOB ~5-8 minutes when she was experiencing increased pain to right hip/glut area. Pt requested to return supine. Would benefit from continued PT services to increase pt's out of bed therapeutic activities and exercises.    Rehab Prognosis: Fair; patient would benefit from acute skilled PT services to address these deficits and reach maximum level of function.    Recent Surgery: * No surgery found *      Plan:     During this hospitalization, patient to be seen 5 x/week to address the identified rehab impairments via gait training, therapeutic activities, therapeutic exercises, neuromuscular re-education and progress toward the following goals:    Plan of Care Expires:  03/08/25    Subjective     Chief Complaint: All over body aches/pains  Patient/Family Comments/goals: Once seated at EOB pt experiencing increased right hip/glut pain  Pain/Comfort:  Pain Rating 1: 8/10  Location - Side 1: Right  Location - Orientation 1: generalized  Location 1: hip  Pain Addressed 1: Reposition, Distraction, Cessation of Activity  Pain Rating Post-Intervention 1:  (Did not  rate)      Objective:     Communicated with nurse prior to session.  Patient found HOB elevated with bed alarm, peripheral IV, telemetry, PureWick upon PT entry to room.     General Precautions: Standard, fall  Orthopedic Precautions: spinal precautions (L4-5 pathological fractures)  Braces: N/A  Respiratory Status: Nasal cannula, flow 2 L/min     Functional Mobility:  Bed Mobility:     Rolling Left:  contact guard assistance  Rolling Right: contact guard assistance  Scooting: stand by assistance, contact guard assistance, and to EOB  Supine to Sit: contact guard assistance and log roll technique with increased time to complete  Sit to Supine: contact guard assistance and log roll technique with increased time to complete      AM-PAC 6 CLICK MOBILITY  Turning over in bed (including adjusting bedclothes, sheets and blankets)?: 3  Sitting down on and standing up from a chair with arms (e.g., wheelchair, bedside commode, etc.): 3  Moving from lying on back to sitting on the side of the bed?: 3  Moving to and from a bed to a chair (including a wheelchair)?: 3  Need to walk in hospital room?: 3  Climbing 3-5 steps with a railing?: 1  Basic Mobility Total Score: 16       Treatment & Education:  Upon entering room pt with nurse receiving medications. Pt agreed to therapy session. Pt able to complete supine to sit at described above. Sat at EOB ~5-8 minutes demonstrating a left lean. Instructed pt to weight shift to right to increase weight bearing to right hip/glut area to achieve midline. Pt performed weight shifting however, with increased weight shifting to R pt expressed increased pain to R hip/glut area requesting to return supine. Pt received gentle manual stretching to hip/glut area(piriformis stretch) 1 x 10 reps bilaterally. PTA performed a hip/knee flexion with adduction with pt in supine HOB slightly elevated. Pt and family member educated in proper technique of performing stretch.    Patient left HOB elevated  with all lines intact, call button in reach, bed alarm on, nurse notified, and sister and family friend present..    GOALS:   Multidisciplinary Problems       Physical Therapy Goals          Problem: Physical Therapy    Goal Priority Disciplines Outcome Interventions   Physical Therapy Goal     PT, PT/OT Progressing    Description: Goals to be met by: 3/8/2025     Patient will increase functional independence with mobility by performin. Supine to sit with Stand-by Assistance  2. Sit to supine with Stand-by Assistance  3. Rolling to Left and Right with Stand-by Assistance.  4. Sit to stand transfer with Stand-by Assistance  5. Bed to chair transfer with Stand-by Assistance using Rolling Walker  6. Gait  x 50 feet with Stand-by Assistance using Rolling Walker.                          DME Justifications:   Nora requires a commode for home use because she is confined to one level of the home environment and there is no toilet on that level.    Time Tracking:     PT Received On: 25  PT Start Time: 0940     PT Stop Time: 1028  PT Total Time (min): 48 min     Billable Minutes: Therapeutic Activity 36 and Neuromuscular Re-education 12    Treatment Type: Treatment  PT/PTA: PTA     Number of PTA visits since last PT visit: 2025

## 2025-02-08 NOTE — PLAN OF CARE
Problem: Adult Inpatient Plan of Care  Goal: Plan of Care Review  Outcome: Progressing  Goal: Patient-Specific Goal (Individualized)  Outcome: Progressing  Goal: Absence of Hospital-Acquired Illness or Injury  Outcome: Progressing  Goal: Optimal Comfort and Wellbeing  Outcome: Progressing  Goal: Readiness for Transition of Care  Outcome: Progressing     Problem: Comorbidity Management  Goal: Blood Pressure in Desired Range  Outcome: Progressing     Problem: Fall Injury Risk  Goal: Absence of Fall and Fall-Related Injury  Outcome: Progressing     Patient not able to participate in home oxygen evaluation at this time reporting pain of 10 when standing, oxygen sats in the 80's today  on room air, when sitting on side the bed to work with therapy.   Notified Dr. Booker.

## 2025-02-08 NOTE — NURSING
Received report from Chiquita, received the patient lying supine with eyes closed, respirations even and unlabored with chest rise and fall, bed locked in low with alarm on and call light in reach at present.

## 2025-02-08 NOTE — PROGRESS NOTES
[facility] Medicine  Progress Note    Patient Name: Nora Torres  MRN: [id number]  Patient Class: IP- Inpatient   Admission Date: 2/4/2025  Length of Stay: 3 days  Attending Physician: La Booker MD  Primary Care Provider: Hernandez Booker MD        Subjective     Principal Problem:Metastatic cancer        HPI:  Ms. Nora Torres is a 64 y.o. 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  The patient was previously admitted to the ED on October 20, 2024, for back pain, at which time an MRI revealed innumerable sclerotic bone lesions, a mild pathologic compression fracture of L5, and mild neural foraminal stenosis at L4/L5 and L5-S1. She was advised to follow up with her PCP, which she did; however, she did not disclose the findings of metastatic cancer.  Since then, the patient reports progressively worsening pain radiating to the right leg. She also notes increasing leg weakness and is unable to walk more than a few steps with a cane. Additionally, she reports a decreased appetite, a 30-pound unintentional weight loss, and reduced oral intake. However, she denies any loss of bowel or bladder control.     In the ED, patient was hypertensive 165/100, tachycardic to 100 , EKGs showed sinus tachycardia.  CT lumbar spine was done which showed innumerable lytic lesions throughout the lumbar spine and pelvis with pathological fracture of L4-L5 .  Labs were significant for hypokalemia 3.3, mildly elevated lactate 2.5, bicarb 22, elevated TSH 40 with low free T4 0.49, hypercalcemia 12.9.  Patient was given dexamethasone, morphine    Overview/Hospital Course:  2/5/25 Heme/Onc consult, recommend biopsy, will consult IR  2/6/25 IR plans for outpatient iliac biopsy  2/7/25 MRI brain wwo skull lesion  2/8: patient not able to walk her  "as her pain goes up to a pain level of 10 when standing, when sitting up on side the bed her oxygen sats were in the 80"s with therapy this morning.     Interval History:  patient not able to walk her as her pain goes up to a pain level of 10 when standing, when sitting up on side the bed her oxygen sats were in the 80"s with therapy this morning.     Review of Systems   Constitutional:  Negative for chills and fever.   Respiratory:  Positive for shortness of breath.    Musculoskeletal:  Positive for back pain and gait problem.     Objective:     Vital Signs (Most Recent):  Temp: 98.6 °F (37 °C) (02/08/25 1534)  Pulse: 83 (02/08/25 1534)  Resp: 20 (02/08/25 1534)  BP: 134/83 (02/08/25 1534)  SpO2: (!) 93 % (02/08/25 1534) Vital Signs (24h Range):  Temp:  [98.1 °F (36.7 °C)-98.7 °F (37.1 °C)] 98.6 °F (37 °C)  Pulse:  [81-97] 83  Resp:  [16-22] 20  SpO2:  [89 %-96 %] 93 %  BP: (114-172)/(62-90) 134/83     Weight: 48.7 kg (107 lb 5.8 oz)  Body mass index is 18.43 kg/m².    Intake/Output Summary (Last 24 hours) at 2/8/2025 1551  Last data filed at 2/7/2025 1755  Gross per 24 hour   Intake 200 ml   Output 550 ml   Net -350 ml         Physical Exam  Constitutional:       Appearance: Normal appearance. She is ill-appearing.   HENT:      Head: Normocephalic and atraumatic.   Eyes:      Extraocular Movements: Extraocular movements intact.      Pupils: Pupils are equal, round, and reactive to light.   Cardiovascular:      Rate and Rhythm: Normal rate and regular rhythm.   Pulmonary:      Comments: L breast mass  Skin:     General: Skin is warm and dry.      Coloration: Skin is not jaundiced.   Neurological:      General: No focal deficit present.      Mental Status: She is alert and oriented to person, place, and time.             Significant Labs: All pertinent labs within the past 24 hours have been reviewed.  CBC:   Recent Labs   Lab 02/07/25  0334   WBC 9.98   HGB 12.8   HCT 39.1        CMP:   Recent Labs   Lab " "02/07/25  0334      K 3.8   *   CO2 18*   GLU 89   BUN 11   CREATININE 0.7   CALCIUM 10.9*   PROT 6.2   ALBUMIN 2.9*   BILITOT 0.3   ALKPHOS 130   AST 40   ALT 8*   ANIONGAP 9       Significant Imaging: I have reviewed all pertinent imaging results/findings within the past 24 hours.    Assessment and Plan     * Metastatic cancer  Heme/Onc consult  Does not have an official diagnosis, pending IR consult for biopsy      Acute hypoxemic respiratory failure  Patient with Hypoxic Respiratory failure which is Acute.  she is not on home oxygen. Supplemental oxygen was provided and noted-      .   Signs/symptoms of respiratory failure include- increased work of breathing, respiratory distress, and wheezing. Contributing diagnoses includes -  metastatic cancer  Labs and images were reviewed. Patient Has not had a recent ABG. Will treat underlying causes and adjust management of respiratory failure as follows- lasix    Will diuresjignesh  May need pulm consult for b/L moderate pleural effusions    Hypercalcemia of malignancy  Hypercalcemia is likely due to Malignancy. The patient has the following symptoms due to their hypercalcemia: weakness. Their most recent calcium and albumin results are listed below.  Recent Labs     02/05/25  0248 02/06/25  0303 02/07/25  0334   CALCIUM 10.9* 10.4 10.9*   ALBUMIN 3.1* 3.0* 2.9*       Plan  - Obtain the following labs to work up the cause of hypercalcemia: PTHrP No results found for: "PTHRP" .   - Treat the hypercalcemia with: IV fluids ordered at a rate of 100 ml/hr.   - The patient's hypercalcemia is improving.   - S/p 1 dose of calcitonin     Mild malnutrition          VTE Risk Mitigation (From admission, onward)           Ordered     enoxaparin injection 30 mg  Daily         02/05/25 1536     IP VTE HIGH RISK PATIENT  Once         02/04/25 1834     Place sequential compression device  Until discontinued         02/04/25 1834                    Discharge Planning   DRU: " 2/8/2025     Code Status: Full Code   Medical Readiness for Discharge Date:   Discharge Plan A: Home Health                Please place Justification for DME        La Booker MD  Department of Hospital Medicine   [facility]

## 2025-02-08 NOTE — SUBJECTIVE & OBJECTIVE
"Interval History:  patient not able to walk her as her pain goes up to a pain level of 10 when standing, when sitting up on side the bed her oxygen sats were in the 80"s with therapy this morning.     Review of Systems   Constitutional:  Negative for chills and fever.   Respiratory:  Positive for shortness of breath.    Musculoskeletal:  Positive for back pain and gait problem.     Objective:     Vital Signs (Most Recent):  Temp: 98.6 °F (37 °C) (02/08/25 1534)  Pulse: 83 (02/08/25 1534)  Resp: 20 (02/08/25 1534)  BP: 134/83 (02/08/25 1534)  SpO2: (!) 93 % (02/08/25 1534) Vital Signs (24h Range):  Temp:  [98.1 °F (36.7 °C)-98.7 °F (37.1 °C)] 98.6 °F (37 °C)  Pulse:  [81-97] 83  Resp:  [16-22] 20  SpO2:  [89 %-96 %] 93 %  BP: (114-172)/(62-90) 134/83     Weight: 48.7 kg (107 lb 5.8 oz)  Body mass index is 18.43 kg/m².    Intake/Output Summary (Last 24 hours) at 2/8/2025 1551  Last data filed at 2/7/2025 1755  Gross per 24 hour   Intake 200 ml   Output 550 ml   Net -350 ml         Physical Exam  Constitutional:       Appearance: Normal appearance. She is ill-appearing.   HENT:      Head: Normocephalic and atraumatic.   Eyes:      Extraocular Movements: Extraocular movements intact.      Pupils: Pupils are equal, round, and reactive to light.   Cardiovascular:      Rate and Rhythm: Normal rate and regular rhythm.   Pulmonary:      Comments: L breast mass  Skin:     General: Skin is warm and dry.      Coloration: Skin is not jaundiced.   Neurological:      General: No focal deficit present.      Mental Status: She is alert and oriented to person, place, and time.             Significant Labs: All pertinent labs within the past 24 hours have been reviewed.  CBC:   Recent Labs   Lab 02/07/25 0334   WBC 9.98   HGB 12.8   HCT 39.1        CMP:   Recent Labs   Lab 02/07/25 0334      K 3.8   *   CO2 18*   GLU 89   BUN 11   CREATININE 0.7   CALCIUM 10.9*   PROT 6.2   ALBUMIN 2.9*   BILITOT 0.3   ALKPHOS 130 "   AST 40   ALT 8*   ANIONGAP 9       Significant Imaging: I have reviewed all pertinent imaging results/findings within the past 24 hours.

## 2025-02-08 NOTE — PLAN OF CARE
Problem: Adult Inpatient Plan of Care  Goal: Optimal Comfort and Wellbeing  Outcome: Progressing     Problem: Fall Injury Risk  Goal: Absence of Fall and Fall-Related Injury  Outcome: Progressing     Problem: Mobility Impairment  Goal: Optimal Mobility  Outcome: Progressing     Problem: Wound  Goal: Optimal Functional Ability  Outcome: Progressing  Goal: Improved Oral Intake  Outcome: Progressing     Problem: Oncology Care  Goal: Effective Coping  Outcome: Progressing

## 2025-02-08 NOTE — ASSESSMENT & PLAN NOTE
Patient with Hypoxic Respiratory failure which is Acute.  she is not on home oxygen. Supplemental oxygen was provided and noted-      .   Signs/symptoms of respiratory failure include- increased work of breathing, respiratory distress, and wheezing. Contributing diagnoses includes -  metastatic cancer  Labs and images were reviewed. Patient Has not had a recent ABG. Will treat underlying causes and adjust management of respiratory failure as follows- lasix    Will diurese  May need pulm consult for b/L moderate pleural effusions

## 2025-02-09 NOTE — ASSESSMENT & PLAN NOTE
Heme/Onc consult  Does not have an official diagnosis  IR consulted for biopsy and rec'd outpt biopsy

## 2025-02-09 NOTE — PLAN OF CARE
Problem: Adult Inpatient Plan of Care  Goal: Plan of Care Review  Outcome: Progressing   Plan of care reviewed with patient and family. All verbalized understanding. Medicated per MAR. Instructed to use call light for assistance. Call light in reach.

## 2025-02-09 NOTE — PLAN OF CARE
Problem: Adult Inpatient Plan of Care  Goal: Plan of Care Review  Outcome: Progressing  Goal: Patient-Specific Goal (Individualized)  Outcome: Progressing  Goal: Absence of Hospital-Acquired Illness or Injury  Outcome: Progressing  Goal: Optimal Comfort and Wellbeing  Outcome: Progressing  Goal: Readiness for Transition of Care  Outcome: Progressing     Problem: Comorbidity Management  Goal: Blood Pressure in Desired Range  Outcome: Progressing     Problem: Fall Injury Risk  Goal: Absence of Fall and Fall-Related Injury  Outcome: Progressing

## 2025-02-09 NOTE — PROGRESS NOTES
Discharge orders noted. Additional clinical references attached. Patient's discharge instructions given by bedside RN and reviewed via this VN.  Education provided on new and previous medications, diagnosis, and follow-up appointments.  New medications were sent to patient's  pharmacy. Patient verbalized understanding and teach back method was used. Patient's ride/transportation home at bedside. All questions answered. Transport to [facility] will be requested. Floor nurse notified.              02/09/25 6237    Notification    Notified Of Discharge Status   Admission   Communication Issues? None   Shift   Virtual Nurse - Rounding Complete   Virtual Nurse - Patient Verbalized Approval Of Camera Use   Safety/Activity   Patient Rounds bed in low position;call light in patient/parent reach;clutter free environment maintained;visualized patient   Safety Promotion/Fall Prevention family to remain at bedside   Positioning   Body Position supine   Head of Bed (HOB) Positioning HOB elevated

## 2025-02-09 NOTE — NURSING
Home Oxygen Evaluation    Date Performed: 2025    1) Patient's Home O2 Sat on room air, while at rest: 92        If O2 sats on room air at rest are 88% or below, patient qualifies. No additional testing needed. Document N/A in steps 2 and 3. If 89% or above, complete steps 2.      2) Patient's O2 Sat on room air while exercisin        If O2 sats on room air while exercising remain 89% or above patient does not qualify, no further testing needed Document N/A in step 3. If O2 sats on room air while exercising are 88% or below, continue to step 3.      3) Patient's O2 Sat while exercising on O2: 85 at 3 LPM         (Must show improvement from #2 for patients to qualify)    If O2 sats improve on oxygen, patient qualifies for portable oxygen. If not, the patient does not qualify.

## 2025-02-09 NOTE — PLAN OF CARE
Pt agreeable with discharge to home today with HH services from [facility] . Follow up appointments added for AVS. Multiple family members at bedside to transport. BSC and  E-Tank (oxygen) for Home use delivered at bedside.   Future Appointments   Date Time Provider Department Center   2/14/2025 11:00 AM Alan Damian MD [facility] HEM ONC [facility]      02/09/25 1256   Final Note   Assessment Type Final Discharge Note   Anticipated Discharge Disposition Home-Health  ([facility] )   What phone number can be called within the next 1-3 days to see how you are doing after discharge? [phone]   Hospital Resources/Appts/Education Provided Appointments scheduled and added to AVS   Post-Acute Status   Post-Acute Authorization Home Health   Home Health Status Set-up Complete/Auth obtained   Discharge Delays None known at this time

## 2025-02-09 NOTE — PROGRESS NOTES
[facility] Medicine  Progress Note    Patient Name: Nora Torres  MRN: [id number]  Patient Class: IP- Inpatient   Admission Date: 2/4/2025  Length of Stay: 4 days  Attending Physician: La Booker MD  Primary Care Provider: Hernandez Booker MD        Subjective     Principal Problem:Metastatic cancer        HPI:  Ms. Nora Torres is a 64 y.o. 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  The patient was previously admitted to the ED on October 20, 2024, for back pain, at which time an MRI revealed innumerable sclerotic bone lesions, a mild pathologic compression fracture of L5, and mild neural foraminal stenosis at L4/L5 and L5-S1. She was advised to follow up with her PCP, which she did; however, she did not disclose the findings of metastatic cancer.  Since then, the patient reports progressively worsening pain radiating to the right leg. She also notes increasing leg weakness and is unable to walk more than a few steps with a cane. Additionally, she reports a decreased appetite, a 30-pound unintentional weight loss, and reduced oral intake. However, she denies any loss of bowel or bladder control.     In the ED, patient was hypertensive 165/100, tachycardic to 100 , EKGs showed sinus tachycardia.  CT lumbar spine was done which showed innumerable lytic lesions throughout the lumbar spine and pelvis with pathological fracture of L4-L5 .  Labs were significant for hypokalemia 3.3, mildly elevated lactate 2.5, bicarb 22, elevated TSH 40 with low free T4 0.49, hypercalcemia 12.9.  Patient was given dexamethasone, morphine    Overview/Hospital Course:  2/5/25 Heme/Onc consult, recommend biopsy, will consult IR  2/6/25 IR plans for outpatient iliac biopsy  2/7/25 MRI brain wwo skull lesion  2/8: patient not able to walk her  "as her pain goes up to a pain level of 10 when standing, when sitting up on side the bed her oxygen sats were in the 80"s with therapy this morning.     Interval History:  pt was to be d/c home  but family now wants pt to be in NH    Review of Systems   Constitutional:  Negative for chills and fever.   Respiratory:  Positive for shortness of breath.    Musculoskeletal:  Positive for back pain and gait problem.     Objective:     Vital Signs (Most Recent):  Temp: 98.1 °F (36.7 °C) (02/09/25 1617)  Pulse: 86 (02/09/25 1617)  Resp: 20 (02/09/25 1617)  BP: (!) 154/87 (02/09/25 1617)  SpO2: (!) 93 % (02/09/25 1617) Vital Signs (24h Range):  Temp:  [97.5 °F (36.4 °C)-98.4 °F (36.9 °C)] 98.1 °F (36.7 °C)  Pulse:  [78-92] 86  Resp:  [16-20] 20  SpO2:  [93 %-97 %] 93 %  BP: (139-172)/(83-88) 154/87     Weight: 52.7 kg (116 lb 2.9 oz)  Body mass index is 19.94 kg/m².    Intake/Output Summary (Last 24 hours) at 2/9/2025 1651  Last data filed at 2/8/2025 2345  Gross per 24 hour   Intake 120 ml   Output 1950 ml   Net -1830 ml         Physical Exam  Constitutional:       Appearance: Normal appearance. She is ill-appearing.   HENT:      Head: Normocephalic and atraumatic.   Eyes:      Extraocular Movements: Extraocular movements intact.      Pupils: Pupils are equal, round, and reactive to light.   Cardiovascular:      Rate and Rhythm: Normal rate and regular rhythm.   Pulmonary:      Comments: L breast mass  Skin:     General: Skin is warm and dry.      Coloration: Skin is not jaundiced.   Neurological:      General: No focal deficit present.      Mental Status: She is alert and oriented to person, place, and time.             Significant Labs: All pertinent labs within the past 24 hours have been reviewed.  CBC:   No results for input(s): "WBC", "HGB", "HCT", "PLT" in the last 48 hours.    CMP:   No results for input(s): "NA", "K", "CL", "CO2", "GLU", "BUN", "CREATININE", "CALCIUM", "PROT", "ALBUMIN", "BILITOT", "ALKPHOS", " ""AST", "ALT", "ANIONGAP", "EGFRNONAA" in the last 48 hours.    Invalid input(s): "ESTGFAFRICA"      Significant Imaging: I have reviewed all pertinent imaging results/findings within the past 24 hours.    Assessment and Plan     * Metastatic cancer  Heme/Onc consult  Does not have an official diagnosis  IR consulted for biopsy and rec'd outpt biopsy      Acute hypoxemic respiratory failure  Patient with Hypoxic Respiratory failure which is Acute.  she is not on home oxygen. Supplemental oxygen was provided and noted-      .   Signs/symptoms of respiratory failure include- increased work of breathing, respiratory distress, and wheezing. Contributing diagnoses includes -  metastatic cancer  Labs and images were reviewed. Patient Has not had a recent ABG. Will treat underlying causes and adjust management of respiratory failure as follows- lasix    Neeraj jarrettnormajignesh  May need pulm consult for b/L moderate pleural effusions    Hypercalcemia of malignancy  Hypercalcemia is likely due to Malignancy. The patient has the following symptoms due to their hypercalcemia: weakness. Their most recent calcium and albumin results are listed below.  Recent Labs     02/05/25  0248 02/06/25  0303 02/07/25  0334   CALCIUM 10.9* 10.4 10.9*   ALBUMIN 3.1* 3.0* 2.9*       Plan  - Obtain the following labs to work up the cause of hypercalcemia: PTHrP No results found for: "PTHRP" .   - Treat the hypercalcemia with: IV fluids ordered at a rate of 100 ml/hr.   - The patient's hypercalcemia is improving.   - S/p 1 dose of calcitonin     Mild malnutrition          VTE Risk Mitigation (From admission, onward)           Ordered     enoxaparin injection 30 mg  Daily         02/05/25 1536     IP VTE HIGH RISK PATIENT  Once         02/04/25 1834     Place sequential compression device  Until discontinued         02/04/25 1834                    Discharge Planning   DRU: 2/9/2025     Code Status: Full Code   Medical Readiness for Discharge Date: " 2/9/2025  Discharge Plan A: Home Health   Discharge Delays: None known at this time            Please place Justification for DME        La Booker MD  Department of Hospital Medicine   [facility]

## 2025-02-09 NOTE — PLAN OF CARE
Problem: Adult Inpatient Plan of Care  Goal: Plan of Care Review  2/9/2025 1520 by Rita Bell, RN  Outcome: Met  2/9/2025 0718 by Rita Bell, RN  Outcome: Progressing  Goal: Patient-Specific Goal (Individualized)  Outcome: Met  Goal: Absence of Hospital-Acquired Illness or Injury  Outcome: Met  Goal: Optimal Comfort and Wellbeing  Outcome: Met  Goal: Readiness for Transition of Care  Outcome: Met

## 2025-02-09 NOTE — PLAN OF CARE
[facility]      HOME HEALTH ORDERS  FACE TO FACE ENCOUNTER    Patient Name: Nora Torres  YOB: 1960    PCP: Hernandez Booker MD   PCP Address: [address] / [place] [address]  PCP Phone Number: [phone]  PCP Fax: [phone]    Encounter Date: 2/4/25    Admit to Home Health    Diagnoses:  Active Hospital Problems    Diagnosis  POA    *Metastatic cancer [C79.9]  Yes    Acute hypoxemic respiratory failure [J96.01]  Yes    Mild malnutrition [E44.1]  Yes    Hypercalcemia of malignancy [E83.52]  Yes      Resolved Hospital Problems   No resolved problems to display.       Follow Up Appointments:  Future Appointments   Date Time Provider Department Center   2/14/2025 11:00 AM Alan Damian MD [facility] HEM ONC [facility]       Allergies:Review of patient's allergies indicates:  No Known Allergies    Medications: Review discharge medications with patient and family and provide education.    Current Facility-Administered Medications   Medication Dose Route Frequency Provider Last Rate Last Admin    acetaminophen tablet 650 mg  650 mg Oral Q6H Yulia Wolf MD   650 mg at 02/09/25 0605    albuterol-ipratropium 2.5 mg-0.5 mg/3 mL nebulizer solution 3 mL  3 mL Nebulization Q4H WAKE La Booker MD   3 mL at 02/09/25 0750    ascorbic acid (vitamin C) tablet 500 mg  500 mg Oral Daily Yulia Wolf MD   500 mg at 02/09/25 0831    cyanocobalamin tablet 1,000 mcg  1,000 mcg Oral Daily Yulia Wolf MD   1,000 mcg at 02/09/25 0832    cyclobenzaprine tablet 5 mg  5 mg Oral TID PRN Yulia Wolf MD   5 mg at 02/08/25 1424    dexAMETHasone tablet 2 mg  2 mg Oral Q12H Angeli Fay MD   2 mg at 02/09/25 0831    dextrose 50% injection 12.5 g  12.5 g Intravenous PRN Yulia Wolf MD        dextrose 50% injection 25 g  25 g Intravenous PRN Yulia Wolf MD        enoxaparin injection 30 mg  30 mg Subcutaneous Daily Angeli Fay MD   30 mg at 02/08/25  1723    folic acid tablet 1 mg  1 mg Oral Daily Angeil Fay MD   1 mg at 02/09/25 0832    furosemide injection 40 mg  40 mg Intravenous Q12H La Booker MD   40 mg at 02/09/25 0838    gabapentin capsule 300 mg  300 mg Oral TID Clive Rivera Jr., MD   300 mg at 02/09/25 0831    glucagon (human recombinant) injection 1 mg  1 mg Intramuscular PRN Yulia Wolf MD        glucose chewable tablet 16 g  16 g Oral PRN Yulia Wolf MD        glucose chewable tablet 24 g  24 g Oral PRN Yulia Wolf MD        levothyroxine tablet 150 mcg  150 mcg Oral Before breakfast Angeli Fay MD   150 mcg at 02/09/25 0605    LIDOcaine 5 % patch 1 patch  1 patch Transdermal Q24H Yulia Wolf MD   1 patch at 02/08/25 1424    magnesium oxide tablet 400 mg  400 mg Oral BID Yulia Wolf MD   400 mg at 02/09/25 0832    metoclopramide injection 5 mg  5 mg Intravenous Q6H PRN Yulia Wolf MD        naloxone 0.4 mg/mL injection 0.02 mg  0.02 mg Intravenous PRN Yulia Wolf MD        nicotine 21 mg/24 hr 1 patch  1 patch Transdermal Daily Angeli Fay MD   1 patch at 02/09/25 0835    ondansetron injection 4 mg  4 mg Intravenous Q6H PRYulia Garrido MD   4 mg at 02/07/25 0910    oxyCODONE-acetaminophen 5-325 mg per tablet 1 tablet  1 tablet Oral Q4H PRN Clive Rivera Jr., MD   1 tablet at 02/08/25 1424    polyethylene glycol packet 17 g  17 g Oral Daily Angeli Fay MD   17 g at 02/08/25 0941    propranoloL tablet 10 mg  10 mg Oral BID Yulia Wolf MD   10 mg at 02/09/25 0832    senna tablet 8.6 mg  8.6 mg Oral BID Yulia Wolf MD   8.6 mg at 02/08/25 2117    simethicone chewable tablet 80 mg  1 tablet Oral QID PRN Yulia Wolf MD        sodium chloride 0.9% flush 10 mL  10 mL Intravenous Q12H Yulia Craven MD            Medication List        START taking these medications      dexAMETHasone 2 MG tablet  Commonly known as: DECADRON  Take 1  tablet (2 mg total) by mouth once daily.     nicotine 21 mg/24 hr  Commonly known as: NICODERM CQ  Place 1 patch onto the skin once daily.     oxyCODONE-acetaminophen  mg per tablet  Commonly known as: PERCOCET  Take 1 tablet by mouth every 8 (eight) hours as needed for Pain.            CHANGE how you take these medications      gabapentin 300 MG capsule  Commonly known as: NEURONTIN  Take 1 capsule (300 mg total) by mouth 3 (three) times daily.  What changed: when to take this            CONTINUE taking these medications      amLODIPine 10 MG tablet  Commonly known as: NORVASC  Take 10 mg by mouth once daily.     levothyroxine 150 MCG tablet  Commonly known as: SYNTHROID  Take 150 mcg by mouth once daily.     lisinopriL-hydrochlorothiazide 20-25 mg Tab  Commonly known as: PRINZIDE,ZESTORETIC  Take 1 tablet by mouth once daily.     propranoloL 10 MG tablet  Commonly known as: INDERAL  Take 10 mg by mouth 2 (two) times daily.     VITAMIN C 500 MG tablet  Generic drug: ascorbic acid (vitamin C)  Take 500 mg by mouth once daily.                I have seen and examined this patient within the last 30 days. My clinical findings that support the need for the home health skilled services and home bound status are the following:no   Weakness/numbness causing balance and gait disturbance due to Malignancy/Cancer making it taxing to leave home.     Diet:   regular diet    Labs:  Report Lab results to PCP.    Referrals/ Consults  Physical Therapy to evaluate and treat. Evaluate for home safety and equipment needs; Establish/upgrade home exercise program. Perform / instruct on therapeutic exercises, gait training, transfer training, and Range of Motion.  Occupational Therapy to evaluate and treat. Evaluate home environment for safety and equipment needs. Perform/Instruct on transfers, ADL training, ROM, and therapeutic exercises.   to evaluate for community resources/long-range planning.  Aide to provide  assistance with personal care, ADLs, and vital signs.    Activities:   activity as tolerated    Nursing:   Agency to admit patient within 24 hours of hospital discharge unless specified on physician order or at patient request    SN to complete comprehensive assessment including routine vital signs. Instruct on disease process and s/s of complications to report to MD. Review/verify medication list sent home with the patient at time of discharge  and instruct patient/caregiver as needed. Frequency may be adjusted depending on start of care date.     Skilled nurse to perform up to 3 visits PRN for symptoms related to diagnosis    Notify MD if SBP > 160 or < 90; DBP > 90 or < 50; HR > 120 or < 50; Temp > 101; O2 < 88%; Other:       Ok to schedule additional visits based on staff availability and patient request on consecutive days within the home health episode.    When multiple disciplines ordered:    Start of Care occurs on Sunday - Wednesday schedule remaining discipline evaluations as ordered on separate consecutive days following the start of care.    Thursday SOC -schedule subsequent evaluations Friday and Monday the following week.     Friday - Saturday SOC - schedule subsequent discipline evaluations on consecutive days starting Monday of the following week.    For all post-discharge communication and subsequent orders please contact patient's primary care physician. If unable to reach primary care physician or do not receive response within 30 minutes, please contact SW for clinical staff order clarification    Miscellaneous   Routine Skin for Bedridden Patients: Instruct patient/caregiver to apply moisture barrier cream to all skin folds and wet areas in perineal area daily and after baths and all bowel movements.    Home Health Aide:  Nursing Three times weekly, Physical Therapy Three times weekly, Occupational Therapy Three times weekly, Medical Social Work Three times weekly, Respiratory Therapy Three  times weekly, and Home Health Aide Three times weekly    Wound Care Orders  no    I certify that this patient is confined to her home and needs intermittent skilled nursing care, physical therapy, and occupational therapy.

## 2025-02-09 NOTE — SUBJECTIVE & OBJECTIVE
"Interval History:  pt was to be d/c home  but family now wants pt to be in NH    Review of Systems   Constitutional:  Negative for chills and fever.   Respiratory:  Positive for shortness of breath.    Musculoskeletal:  Positive for back pain and gait problem.     Objective:     Vital Signs (Most Recent):  Temp: 98.1 °F (36.7 °C) (02/09/25 1617)  Pulse: 86 (02/09/25 1617)  Resp: 20 (02/09/25 1617)  BP: (!) 154/87 (02/09/25 1617)  SpO2: (!) 93 % (02/09/25 1617) Vital Signs (24h Range):  Temp:  [97.5 °F (36.4 °C)-98.4 °F (36.9 °C)] 98.1 °F (36.7 °C)  Pulse:  [78-92] 86  Resp:  [16-20] 20  SpO2:  [93 %-97 %] 93 %  BP: (139-172)/(83-88) 154/87     Weight: 52.7 kg (116 lb 2.9 oz)  Body mass index is 19.94 kg/m².    Intake/Output Summary (Last 24 hours) at 2/9/2025 1651  Last data filed at 2/8/2025 2345  Gross per 24 hour   Intake 120 ml   Output 1950 ml   Net -1830 ml         Physical Exam  Constitutional:       Appearance: Normal appearance. She is ill-appearing.   HENT:      Head: Normocephalic and atraumatic.   Eyes:      Extraocular Movements: Extraocular movements intact.      Pupils: Pupils are equal, round, and reactive to light.   Cardiovascular:      Rate and Rhythm: Normal rate and regular rhythm.   Pulmonary:      Comments: L breast mass  Skin:     General: Skin is warm and dry.      Coloration: Skin is not jaundiced.   Neurological:      General: No focal deficit present.      Mental Status: She is alert and oriented to person, place, and time.             Significant Labs: All pertinent labs within the past 24 hours have been reviewed.  CBC:   No results for input(s): "WBC", "HGB", "HCT", "PLT" in the last 48 hours.    CMP:   No results for input(s): "NA", "K", "CL", "CO2", "GLU", "BUN", "CREATININE", "CALCIUM", "PROT", "ALBUMIN", "BILITOT", "ALKPHOS", "AST", "ALT", "ANIONGAP", "EGFRNONAA" in the last 48 hours.    Invalid input(s): "ESTGFAFRICA"      Significant Imaging: I have reviewed all pertinent imaging " results/findings within the past 24 hours.

## 2025-02-10 NOTE — PLAN OF CARE
0840  CM was informed by the pt's sister, Herlinda Torres ([phone]), that the family will not be able to provide adequate care for the pt at home, that she would like the pt to be admitted to [facility], & that the pt will not be receiving chemo or radiation treatments for her cancer. Referral sent via EPIC. Awaiting response.     CXR for NH placement ordered.     0900  CM completed LOCET & faxed PASRR to the state. Awaiting 142.    0905  CM instructed Herlinda to contact [place] ([phone]) w/[facility] [employer] to discuss the pt's financial for NH placement. Herlinda verbalized understanding.     1000  142 received from the state. CM requested that the pt's PASRR/142 be uploaded into the pt's chart.     1115  Patient resting quietly in bed with sister, Herlinda Torres, at the bedside when CM rounded. CM informed the pt of referrals sent this AM. Pt verbalized understanding & agreement. Herlinda stated that the pt's other sister, Adriana Mayers ([phone]), is working on signing the admission paperwork at [facility].     CM informed both that the e-tank & BSC delivered yesterday noted at the bedside will be returned to [facility] if the pt discharges to a NH.  Both verbalized understanding.     1430  Pt has been accepted by [facility], [facility], & [facility]. CXR sent to [facility] as requested by the pt's family & message sent questioning pt's discharge status. Awaiting response.     1450  CM informed [facility] ([phone]) & [facility] ([phone]) that prescriptions sent yesterday can be canceled.       Will continue to follow.

## 2025-02-10 NOTE — ASSESSMENT & PLAN NOTE
Patient with Hypoxic Respiratory failure which is Acute.  she is not on home oxygen. Supplemental oxygen was provided and noted-      .   Signs/symptoms of respiratory failure include- increased work of breathing, respiratory distress, and wheezing. Contributing diagnoses includes -  metastatic cancer  Labs and images were reviewed. Patient Has not had a recent ABG. Will treat underlying causes and adjust management of respiratory failure as follows- lasix    Continue diuresis with lasix

## 2025-02-10 NOTE — PLAN OF CARE
Problem: Fall Injury Risk  Goal: Absence of Fall and Fall-Related Injury  Outcome: Progressing     Problem: Pain Acute  Goal: Optimal Pain Control and Function  Outcome: Progressing     Problem: Coping Ineffective  Goal: Effective Coping  Outcome: Progressing     Problem: Skin Injury Risk Increased  Goal: Skin Health and Integrity  Outcome: Progressing     Problem: Family Coping Readiness for Enhanced  Goal: Effective Family Coping  Outcome: Progressing

## 2025-02-10 NOTE — PT/OT/SLP PROGRESS
Occupational Therapy   Treatment    Name: Nora Torres  MRN: [id number]  Admitting Diagnosis:  Metastatic cancer       Recommendations:     Discharge Recommendations: Low Intensity Therapy (24/7 supervision/assistance)  Discharge Equipment Recommendations:  bedside commode, bath bench  Barriers to discharge:  None    Assessment:     Nora Torres is a 64 y.o. female with a medical diagnosis of Metastatic cancer.  Performance deficits affecting function are weakness, impaired endurance, impaired self care skills, impaired functional mobility, gait instability, impaired balance, impaired cognition, decreased coordination, decreased upper extremity function, decreased lower extremity function, decreased safety awareness, pain, impaired coordination, impaired skin, impaired sensation, decreased ROM, impaired joint extensibility.     Rehab Prognosis:  Fair; patient would benefit from acute skilled OT services to address these deficits and reach maximum level of function.       Plan:     Patient to be seen 3 x/week to address the above listed problems via self-care/home management, therapeutic activities, therapeutic exercises  Plan of Care Expires: 03/06/25  Plan of Care Reviewed with: patient    Subjective     Chief Complaint: reports pain in RLE is improved, but patient still concerned over swelling and numbness that occurs  Patient/Family Comments/goals: return to PLOF  Pain/Comfort:  Pain Rating 1:  (none at rest)  Pain Rating Post-Intervention 1:  (reports some numbness in RLE with standing)    Objective:     Communicated with: nurse, Addie prior to session.  Patient found HOB elevated with bed alarm, PureWick, oxygen upon OT entry to room.    General Precautions: Standard, fall    Orthopedic Precautions:spinal precautions (L4-5 pathological fractures)    Bed Mobility:    Patient completed Scooting/Bridging with CGA to EOB; CGA/[facility] to HOB /c VCs and use of drawsheet  Patient completed Supine to Sit with minimum  assistance and with leg lift  Patient completed Sit to Supine with moderate assistance and with leg lift     Functional Mobility/Transfers:  Patient completed Sit <> Stand Transfer with contact guard assistance  with  hand-held assist   Completed multiple stands at bedside /c CG HHA    Activities of Daily Living:  Toileting: purewick; not working properly, so underwear and pads saturated; patient given wipe and able to clean claudia area from bed level with supervision      [facility] 6 Click ADL: 17    Treatment & Education:  Continued education re: purpose/role of OT  Required increased time, effort and VCs for all tasks/transitions  Completed x 4 stands at bedside for pad removal, as well as removal of purewick underwear    Patient left HOB elevated with all lines intact, call button in reach, bed alarm on, and nsg notified    GOALS:   Multidisciplinary Problems       Occupational Therapy Goals       Not on file              Multidisciplinary Problems (Resolved)          Problem: Occupational Therapy    Goal Priority Disciplines Outcome Interventions   Occupational Therapy Goal   (Resolved)     OT, PT/OT Met    Description: Goals to be met by: 3/6/2025     Patient will increase functional independence with ADLs by performing:    UE Dressing with Stand-by Assistance.  LE Dressing with Minimal Assistance.  Grooming while seated with Set-up Assistance.  Toileting from bedside commode with Minimal Assistance for hygiene and clothing management.   Step transfer with Stand by Assistance  Toilet transfer to bedside commode with Stand by Assistance.                         DME Justifications:  No DME recommended requiring DME justifications    Time Tracking:     OT Date of Treatment: 02/10/25  OT Start Time: 0909  OT Stop Time: 0928  OT Total Time (min): 19 min    Billable Minutes:Self Care/Home Management 11  Therapeutic Activity 8    2/10/2025

## 2025-02-10 NOTE — SUBJECTIVE & OBJECTIVE
"Interval History:  pt denies any sob. Diuresing well with lasix for B/L pleural effusions    Review of Systems   Constitutional:  Negative for chills and fever.   Respiratory:  Positive for shortness of breath.    Musculoskeletal:  Positive for back pain and gait problem.     Objective:     Vital Signs (Most Recent):  Temp: 98.6 °F (37 °C) (02/10/25 1143)  Pulse: 80 (02/10/25 1148)  Resp: 18 (02/10/25 1148)  BP: 131/78 (02/10/25 1143)  SpO2: (!) 94 % (02/10/25 1148) Vital Signs (24h Range):  Temp:  [97.9 °F (36.6 °C)-98.6 °F (37 °C)] 98.6 °F (37 °C)  Pulse:  [79-97] 80  Resp:  [17-20] 18  SpO2:  [92 %-96 %] 94 %  BP: (124-156)/(78-87) 131/78     Weight: 51.6 kg (113 lb 12.1 oz)  Body mass index is 19.53 kg/m².    Intake/Output Summary (Last 24 hours) at 2/10/2025 1537  Last data filed at 2/10/2025 0934  Gross per 24 hour   Intake 120 ml   Output 1950 ml   Net -1830 ml         Physical Exam  Constitutional:       Appearance: Normal appearance. She is ill-appearing.   HENT:      Head: Normocephalic and atraumatic.   Eyes:      Extraocular Movements: Extraocular movements intact.      Pupils: Pupils are equal, round, and reactive to light.   Cardiovascular:      Rate and Rhythm: Normal rate and regular rhythm.   Pulmonary:      Comments: L breast mass  Skin:     General: Skin is warm and dry.      Coloration: Skin is not jaundiced.   Neurological:      General: No focal deficit present.      Mental Status: She is alert and oriented to person, place, and time.             Significant Labs: All pertinent labs within the past 24 hours have been reviewed.  CBC:   No results for input(s): "WBC", "HGB", "HCT", "PLT" in the last 48 hours.    CMP:   Recent Labs   Lab 02/10/25  0958      K 3.7      CO2 27      BUN 16   CREATININE 0.8   CALCIUM 11.7*   ANIONGAP 13         Significant Imaging: I have reviewed all pertinent imaging results/findings within the past 24 hours.  "

## 2025-02-10 NOTE — PLAN OF CARE
Patient seen for physical therapy session on this date.  Patient with no complaints of pain on this date.  Patient performs supine to sit via log roll with CGA, slow paced.  Patient able to perform sit to stand with Min assist, slow paced.  Patient ambulates with RW x ~50' with CGA, slow pace.  Patient returns to supine with min assist.  Patient on 2L supplemental oxygen during session, mildly SOB at end of gait trial.  Patient encouraged to perform B LE exercises while in bed.  Continue to recommend low intensity therapy with  supervision/assist.  Full report to follow.      Problem: Physical Therapy  Goal: Physical Therapy Goal  Description: Goals to be met by: 3/8/2025     Patient will increase functional independence with mobility by performin. Supine to sit with Stand-by Assistance  2. Sit to supine with Stand-by Assistance  3. Rolling to Left and Right with Stand-by Assistance.  4. Sit to stand transfer with Stand-by Assistance  5. Bed to chair transfer with Stand-by Assistance using Rolling Walker  6. Gait  x 50 feet with Stand-by Assistance using Rolling Walker.     2/10/2025 1256 by Chante Song, PT  Outcome: Progressing  2/10/2025 1255 by Chante Song, PT  Reactivated

## 2025-02-10 NOTE — PLAN OF CARE
Recommendation:   1. Encourage intake at meals.   2. Monitor weight/labs.   3. RD to follow to monitor intake    Goals:   Pt to tolerate diet and meet % of needs by RD follow up.  Nutrition Goal Status: new

## 2025-02-10 NOTE — PROGRESS NOTES
[facility] Medicine  Progress Note    Patient Name: Nora Torres  MRN: [id number]  Patient Class: IP- Inpatient   Admission Date: 2/4/2025  Length of Stay: 5 days  Attending Physician: La Booker MD  Primary Care Provider: Hernandez Booker MD        Subjective     Principal Problem:Metastatic cancer        HPI:  Ms. Nora Torres is a 64 y.o. 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  The patient was previously admitted to the ED on October 20, 2024, for back pain, at which time an MRI revealed innumerable sclerotic bone lesions, a mild pathologic compression fracture of L5, and mild neural foraminal stenosis at L4/L5 and L5-S1. She was advised to follow up with her PCP, which she did; however, she did not disclose the findings of metastatic cancer.  Since then, the patient reports progressively worsening pain radiating to the right leg. She also notes increasing leg weakness and is unable to walk more than a few steps with a cane. Additionally, she reports a decreased appetite, a 30-pound unintentional weight loss, and reduced oral intake. However, she denies any loss of bowel or bladder control.     In the ED, patient was hypertensive 165/100, tachycardic to 100 , EKGs showed sinus tachycardia.  CT lumbar spine was done which showed innumerable lytic lesions throughout the lumbar spine and pelvis with pathological fracture of L4-L5 .  Labs were significant for hypokalemia 3.3, mildly elevated lactate 2.5, bicarb 22, elevated TSH 40 with low free T4 0.49, hypercalcemia 12.9.  Patient was given dexamethasone, morphine    Overview/Hospital Course:  2/5/25 Heme/Onc consult, recommend biopsy, will consult IR  2/6/25 IR plans for outpatient iliac biopsy  2/7/25 MRI brain wwo skull lesion  2/8: patient not able to walk her  "as her pain goes up to a pain level of 10 when standing, when sitting up on side the bed her oxygen sats were in the 80"s with therapy this morning.   2/9:family wants NH placement  2/10: pt agreeable to NH placement which is pending    Interval History:  pt denies any sob. Diuresing well with lasix for B/L pleural effusions    Review of Systems   Constitutional:  Negative for chills and fever.   Respiratory:  Positive for shortness of breath.    Musculoskeletal:  Positive for back pain and gait problem.     Objective:     Vital Signs (Most Recent):  Temp: 98.6 °F (37 °C) (02/10/25 1143)  Pulse: 80 (02/10/25 1148)  Resp: 18 (02/10/25 1148)  BP: 131/78 (02/10/25 1143)  SpO2: (!) 94 % (02/10/25 1148) Vital Signs (24h Range):  Temp:  [97.9 °F (36.6 °C)-98.6 °F (37 °C)] 98.6 °F (37 °C)  Pulse:  [79-97] 80  Resp:  [17-20] 18  SpO2:  [92 %-96 %] 94 %  BP: (124-156)/(78-87) 131/78     Weight: 51.6 kg (113 lb 12.1 oz)  Body mass index is 19.53 kg/m².    Intake/Output Summary (Last 24 hours) at 2/10/2025 1537  Last data filed at 2/10/2025 0934  Gross per 24 hour   Intake 120 ml   Output 1950 ml   Net -1830 ml         Physical Exam  Constitutional:       Appearance: Normal appearance. She is ill-appearing.   HENT:      Head: Normocephalic and atraumatic.   Eyes:      Extraocular Movements: Extraocular movements intact.      Pupils: Pupils are equal, round, and reactive to light.   Cardiovascular:      Rate and Rhythm: Normal rate and regular rhythm.   Pulmonary:      Comments: L breast mass  Skin:     General: Skin is warm and dry.      Coloration: Skin is not jaundiced.   Neurological:      General: No focal deficit present.      Mental Status: She is alert and oriented to person, place, and time.             Significant Labs: All pertinent labs within the past 24 hours have been reviewed.  CBC:   No results for input(s): "WBC", "HGB", "HCT", "PLT" in the last 48 hours.    CMP:   Recent Labs   Lab 02/10/25  0958      K " "3.7      CO2 27      BUN 16   CREATININE 0.8   CALCIUM 11.7*   ANIONGAP 13         Significant Imaging: I have reviewed all pertinent imaging results/findings within the past 24 hours.    Assessment and Plan     * Metastatic cancer  Heme/Onc consult  Does not have an official diagnosis  IR consulted for biopsy and rec'd outpt biopsy      Acute hypoxemic respiratory failure  Patient with Hypoxic Respiratory failure which is Acute.  she is not on home oxygen. Supplemental oxygen was provided and noted-      .   Signs/symptoms of respiratory failure include- increased work of breathing, respiratory distress, and wheezing. Contributing diagnoses includes -  metastatic cancer  Labs and images were reviewed. Patient Has not had a recent ABG. Will treat underlying causes and adjust management of respiratory failure as follows- lasix    Continue diuresis with lasix    Hypercalcemia of malignancy  Hypercalcemia is likely due to Malignancy. The patient has the following symptoms due to their hypercalcemia: weakness. Their most recent calcium and albumin results are listed below.  Recent Labs     02/05/25  0248 02/06/25  0303 02/07/25  0334   CALCIUM 10.9* 10.4 10.9*   ALBUMIN 3.1* 3.0* 2.9*       Plan  - Obtain the following labs to work up the cause of hypercalcemia: PTHrP No results found for: "PTHRP" .   - Treat the hypercalcemia with: IV fluids ordered at a rate of 100 ml/hr.   - The patient's hypercalcemia is improving.   - S/p 1 dose of calcitonin     Mild malnutrition  Nutritionist ordered        VTE Risk Mitigation (From admission, onward)           Ordered     enoxaparin injection 30 mg  Daily         02/05/25 1536     IP VTE HIGH RISK PATIENT  Once         02/04/25 1834     Place sequential compression device  Until discontinued         02/04/25 1834                    Discharge Planning   DRU: 2/11/2025     Code Status: Full Code   Medical Readiness for Discharge Date: 2/9/2025  Discharge Plan A: Home " Health   Discharge Delays: None known at this time            Please place Justification for DME        La Booker MD  Department of Hospital Medicine   [facility] S[facility]

## 2025-02-10 NOTE — PT/OT/SLP PROGRESS
"Physical Therapy Treatment    Patient Name:  Nora Torres   MRN:  [id number]    Recommendations:     Discharge Recommendations: Low Intensity Therapy (with 24/7 supervision assist/Palliative Care)  Discharge Equipment Recommendations: bedside commode, bath bench  Barriers to discharge: Decreased caregiver support    Assessment:     Nora Torres is a 64 y.o. female admitted with a medical diagnosis of Metastatic cancer.  She presents with the following impairments/functional limitations: weakness, impaired endurance, impaired self care skills, impaired functional mobility, gait instability, impaired balance, decreased lower extremity function, decreased upper extremity function, decreased safety awareness, pain, impaired skin, impaired cardiopulmonary response to activity     Patient seen for physical therapy session on this date.  Patient with no complaints of pain on this date.  Patient performs supine to sit via log roll with CGA, slow paced.  Patient able to perform sit to stand with Min assist, slow paced.  Patient ambulates with RW x ~50' with CGA, slow pace.  Patient returns to supine with min assist.  Patient on 2L supplemental oxygen during session, mildly SOB at end of gait trial.  Patient encouraged to perform B LE exercises while in bed.  Continue to recommend low intensity therapy with 24/7 supervision/assist.  Full report to follow.    Rehab Prognosis: Fair; patient would benefit from acute skilled PT services to address these deficits and reach maximum level of function.    Recent Surgery: * No surgery found *      Plan:     During this hospitalization, patient to be seen 3 x/week to address the identified rehab impairments via gait training, therapeutic activities, therapeutic exercises, neuromuscular re-education and progress toward the following goals:    Plan of Care Expires:  03/08/25    Subjective     Chief Complaint: "I am not hurting today"  Patient/Family Comments/goals: to return to " PLOF  Pain/Comfort:  Pain Rating 1: 0/10  Pain Rating Post-Intervention 1: 0/10      Objective:     Communicated with nurse Addie prior to session.  Patient found supine with bed alarm, PureWick, oxygen upon PT entry to room.     General Precautions: Standard, fall  Orthopedic Precautions: spinal precautions  Braces: N/A  Respiratory Status: Nasal cannula, flow 2 L/min     Functional Mobility:  Bed Mobility:     Rolling Right: contact guard assistance  Scooting: contact guard assistance  Supine to Sit: minimum assistance  Sit to Supine: minimum assistance  Transfers:     Sit to Stand:  minimum assistance with rolling walker  Gait: with RW x 50' with CGA, slow pace  Balance: Seated:  SBA   Standing:  SBA      AM-PAC 6 CLICK MOBILITY  Turning over in bed (including adjusting bedclothes, sheets and blankets)?: 3  Sitting down on and standing up from a chair with arms (e.g., wheelchair, bedside commode, etc.): 3  Moving from lying on back to sitting on the side of the bed?: 3  Moving to and from a bed to a chair (including a wheelchair)?: 3  Need to walk in hospital room?: 3  Climbing 3-5 steps with a railing?: 1  Basic Mobility Total Score: 16       Treatment & Education:  Patient educated on importance of OOB and maintaining spinal precautions with log rolling.  Patient encouraged to perform B LE exercises throughout day.      Patient left supine with all lines intact, call button in reach, bed alarm on, and nurse notified..    GOALS:   Multidisciplinary Problems       Physical Therapy Goals          Problem: Physical Therapy    Goal Priority Disciplines Outcome Interventions   Physical Therapy Goal     PT, PT/OT Progressing    Description: Goals to be met by: 3/8/2025     Patient will increase functional independence with mobility by performin. Supine to sit with Stand-by Assistance  2. Sit to supine with Stand-by Assistance  3. Rolling to Left and Right with Stand-by Assistance.  4. Sit to stand transfer with  Stand-by Assistance  5. Bed to chair transfer with Stand-by Assistance using Rolling Walker  6. Gait  x 50 feet with Stand-by Assistance using Rolling Walker.                          DME Justifications:  No DME recommended requiring DME justifications    Time Tracking:     PT Received On: 02/10/25  PT Start Time: 1040     PT Stop Time: 1101  PT Total Time (min): 21 min     Billable Minutes: Gait Training 11 and Therapeutic Exercise 10    Treatment Type: Treatment  PT/PTA: PT     Number of PTA visits since last PT visit: 0     02/10/2025

## 2025-02-10 NOTE — PROGRESS NOTES
[place] [facility] Surg  Adult Nutrition  Progress Note    SUMMARY       Recommendations    Recommendation:   1. Encourage intake at meals.   2. Monitor weight/labs.   3. RD to follow to monitor intake    Goals:   Pt to tolerate diet and meet % of needs by RD follow up.  Nutrition Goal Status: new  Communication of RD Recs: reviewed with RN    Assessment and Plan  Endocrine  Mild malnutrition  Malnutrition Type:  Context: acute illness or injury  Level: mild    Related to (etiology):   cancer    Signs and Symptoms (as evidenced by):   Weight loss, mild muscle wasting/fat loss     Malnutrition Characteristic Summary:  Weight Loss (Malnutrition): greater than 10% in 6 months (23.31% x 5 months)  Energy Intake (Malnutrition): less than 75% for greater than or equal to 1 month  Subcutaneous Fat (Malnutrition): mild depletion  Muscle Mass (Malnutrition): mild depletion    Interventions:  Commercial beverage    Nutrition Diagnosis Status:   Continues      Malnutrition Assessment  Malnutrition Context: acute illness or injury  Malnutrition Level: mild  Weight Loss (Malnutrition):  (18.74% x 5 months)  Energy Intake (Malnutrition): less than 75% for greater than or equal to 1 month  Subcutaneous Fat (Malnutrition): mild depletion  Muscle Mass (Malnutrition): mild depletion   Orbital Region (Subcutaneous Fat Loss): mild depletion  Upper Arm Region (Subcutaneous Fat Loss): mild depletion  Thoracic and Lumbar Region: mild depletion   Sabianism Region (Muscle Loss): mild depletion  Clavicle Bone Region (Muscle Loss): mild depletion  Clavicle and Acromion Bone Region (Muscle Loss): mild depletion  Scapular Bone Region (Muscle Loss): mild depletion  Dorsal Hand (Muscle Loss): mild depletion  Patellar Region (Muscle Loss): mild depletion  Anterior Thigh Region (Muscle Loss): mild depletion  Posterior Calf Region (Muscle Loss): mild depletion     Reason for Assessment  Reason For Assessment: RD follow-up  Diagnosis: cancer  "diagnosis/related complications  General Information Comments: Pt admitted c/o lower back and leg pain that has been worsening. Was previously admitted to ED (10/20) with back pain and MRI was conducted showing metastatic cancer. Pt did not follow up with pcp regarding this finding. Pt reports that the pain is worse and now radiates down her rt leg. Pt reports leg weakness and that she cannot walk more than a few steps with out a cane. Pt has accepted diagnosis and is expected to d/c to [facility]. Pt will not be receiving chemo or radiation treatment. Pt reports increased appetite, 75% intake. Pt normally eats breakfast and dinner, no lunch. Family bringing pt outside food that she wants for meals. Pt has pmh of HTN and thyroid disease. Pt on a regular diet with Boost Plus BID. Noted a 27 lb weight loss x 5 months. Evan 19- left upper;lower breast wound. Percent intake of meals not yet recorded. NFPE conducted  - mild wasting  Nutrition Discharge Planning: pt to d/c on regular diet    Nutrition/Diet History  Nutrition Intake History: 75%  Food Preferences: no Muslim or cultural food preferences  Spiritual, Cultural Beliefs, Advent Practices, Values that Affect Care: no  Food Allergies: NKFA  Factors Affecting Nutritional Intake: None identified at this time    Anthropometrics  Height: 5' 4" (162.6 cm)  Height (inches): 64 in  Weight: 51.6 kg (113 lb 12.1 oz)  Weight (lb): 113.76 lb  Weight Method: Bed Scale  Ideal Body Weight (IBW), Female: 120 lb  % Ideal Body Weight, Female (lb): 94.8 %  BMI (Calculated): 19.5  BMI Grade: 18.5-24.9 - normal  Weight Loss: unintentional  Usual Body Weight (UBW), k.5 kg  Weight Change Amount: 27 lb (wt loss x 5 months)  % Usual Body Weight: 81.43  % Weight Change From Usual Weight: -18.74 %     Lab/Procedures/Meds  Pertinent Labs Reviewed: reviewed  Pertinent Labs Comments: Ch 113H, Ca 10.9H, Albumin 2.9L  Pertinent Medications Reviewed: reviewed  Pertinent " Medications Comments: ascorbic acid, cyancobalmin, dexamethasone, enoxaparin, folic acid, furosemide, gabapentin, magnesium oxide    Estimated/Assessed Needs  Weight Used For Calorie Calculations: 54.4 kg (120 lb) (IBW)  Energy Calorie Requirements (kcal): 1633 (30 kcal/kg IBW)  Energy Need Method: Kcal/kg  Protein Requirements: 82 g (1.5 g/kg IBW)  Weight Used For Protein Calculations: 54.4 kg (120 lb)  Estimated Fluid Requirement Method: RDA Method  RDA Method (mL): 1633     Nutrition Prescription Ordered  Current Diet Order: regular diet  Oral Nutrition Supplement: Boost Plus BID    Evaluation of Received Nutrient/Fluid Intake  I/O: 360/2050  Energy Calories Required: meeting needs  Protein Required: meeting needs  Fluid Required: meeting needs  Comments: LBM 2/9  % Intake of Estimated Energy Needs: 50 - 75 %  % Meal Intake: 50 - 75 %    Nutrition Risk  Level of Risk/Frequency of Follow-up:  (1 x weekly)     Monitor and Evaluation  Food and Nutrient Intake: food and beverage intake  Food and Nutrient Adminstration: diet order  Knowledge/Beliefs/Attitudes: food and nutrition knowledge/skill  Physical Activity and Function: nutrition-related ADLs and IADLs  Anthropometric Measurements: weight, weight change, body mass index  Biochemical Data, Medical Tests and Procedures: electrolyte and renal panel  Nutrition-Focused Physical Findings: overall appearance     Nutrition Follow-Up  RD Follow-up?: Yes

## 2025-02-11 NOTE — PLAN OF CARE
Assumed care of pt from ROBIN Real. Pt sitting up in bed with HOB at 60o, visiting with sister and 2 brothers. Pt stated she had no pain to report at this time. Pt ambulated to toilet with walker and SBA. Pt tolerated  exertion fairly well, breathing a little heavy upon return to bed.   O2 reapplied. Plan of care reviewed. Pt encouraged to call with needs. Safety maintained throughout shift.     Problem: Fall Injury Risk  Goal: Absence of Fall and Fall-Related Injury  Outcome: Progressing     Problem: Pain Acute  Goal: Optimal Pain Control and Function  Outcome: Progressing     Problem: Coping Ineffective  Goal: Effective Coping  Outcome: Progressing     Problem: Family Coping Readiness for Enhanced  Goal: Effective Family Coping  Outcome: Progressing     Problem: Functional Deficit  Goal: Improved Balance and Postural Control  Outcome: Progressing

## 2025-02-11 NOTE — PT/OT/SLP PROGRESS
Physical Therapy      Patient Name:  Nora Torres   MRN:  [id number]    Patient not seen today secondary to Other (Comment) (pt experiencing increased fatigue upon AM and PM attempts(3286)). Will follow-up tomorrow.

## 2025-02-11 NOTE — PLAN OF CARE
Problem: Adult Inpatient Plan of Care  Goal: Plan of Care Review  Outcome: Progressing     Problem: Fall Injury Risk  Goal: Absence of Fall and Fall-Related Injury  Outcome: Progressing     Problem: Pain Acute  Goal: Optimal Pain Control and Function  Outcome: Progressing     Patient has a left breast wound covered with mepilex. Next dressing change due on Wednesday. Patient was able to turn side to side independently. Still on purewick.

## 2025-02-11 NOTE — PROGRESS NOTES
Pharmacist Renal Dose Adjustment Note    Nora Torres is a 64 y.o. female being treated with the medication enoxaparin    Patient Data:    Vital Signs (Most Recent):  Temp: 98 °F (36.7 °C) (02/11/25 1220)  Pulse: 87 (02/11/25 1220)  Resp: 18 (02/11/25 1220)  BP: 124/81 (02/11/25 1220)  SpO2: (!) 93 % (02/11/25 1220) Vital Signs (72h Range):  Temp:  [97.5 °F (36.4 °C)-98.7 °F (37.1 °C)]   Pulse:  []   Resp:  [16-22]   BP: (118-172)/(77-88)   SpO2:  [92 %-97 %]      Recent Labs   Lab 02/06/25  0303 02/07/25  0334 02/10/25  0958   CREATININE 0.6 0.7 0.8     Serum creatinine: 0.8 mg/dL 02/10/25 0958  Estimated creatinine clearance: 59.4 mL/min    Medication:enoxaparin dose: 30mg frequency daily will be changed to medication:enoxaparin dose:40mg frequency:daily    Pharmacist's Name: Don Booker  Pharmacist's Extension: [phone]

## 2025-02-11 NOTE — SUBJECTIVE & OBJECTIVE
"Interval History:      NAEON  Pain well managed  Denies dyspnea  No f/c/n/v  Tolerating PO diet    Review of Systems   Constitutional:  Negative for chills and fever.   Respiratory:  Positive for shortness of breath.    Musculoskeletal:  Positive for back pain and gait problem.     Objective:     Vital Signs (Most Recent):  Temp: 98 °F (36.7 °C) (02/11/25 1220)  Pulse: 87 (02/11/25 1220)  Resp: 18 (02/11/25 1220)  BP: 124/81 (02/11/25 1220)  SpO2: (!) 93 % (02/11/25 1220) Vital Signs (24h Range):  Temp:  [97.7 °F (36.5 °C)-98.7 °F (37.1 °C)] 98 °F (36.7 °C)  Pulse:  [] 87  Resp:  [17-20] 18  SpO2:  [93 %-97 %] 93 %  BP: (118-143)/(77-87) 124/81     Weight: 51.6 kg (113 lb 12.1 oz)  Body mass index is 19.53 kg/m².    Intake/Output Summary (Last 24 hours) at 2/11/2025 1222  Last data filed at 2/11/2025 0830  Gross per 24 hour   Intake 360 ml   Output 1000 ml   Net -640 ml         Physical Exam  Constitutional:       Appearance: Normal appearance. She is ill-appearing.   HENT:      Head: Normocephalic and atraumatic.   Eyes:      Extraocular Movements: Extraocular movements intact.      Pupils: Pupils are equal, round, and reactive to light.   Cardiovascular:      Rate and Rhythm: Normal rate and regular rhythm.   Pulmonary:      Effort: Pulmonary effort is normal.      Breath sounds: Normal breath sounds.      Comments: L breast mass  Abdominal:      General: There is no distension.      Tenderness: There is no abdominal tenderness.   Skin:     General: Skin is warm and dry.      Coloration: Skin is not jaundiced.   Neurological:      General: No focal deficit present.      Mental Status: She is alert and oriented to person, place, and time.   Psychiatric:         Mood and Affect: Mood normal.         Behavior: Behavior normal.             Significant Labs: All pertinent labs within the past 24 hours have been reviewed.  CBC:   No results for input(s): "WBC", "HGB", "HCT", "PLT" in the last 48 hours.    CMP: "   Recent Labs   Lab 02/10/25  0958      K 3.7      CO2 27      BUN 16   CREATININE 0.8   CALCIUM 11.7*   ANIONGAP 13         Significant Imaging: I have reviewed all pertinent imaging results/findings within the past 24 hours.

## 2025-02-11 NOTE — ASSESSMENT & PLAN NOTE
"Hypercalcemia is likely due to Malignancy. The patient has the following symptoms due to their hypercalcemia: weakness. Their most recent calcium and albumin results are listed below.  Recent Labs     02/10/25  0958   CALCIUM 11.7*       Plan  - Obtain the following labs to work up the cause of hypercalcemia: PTHrP No results found for: "PTHRP" .   - Treat the hypercalcemia with: IV fluids ordered at a rate of 100 ml/hr.   - The patient's hypercalcemia is improving.     Asymptomatic    Plan:  - S/p 1 dose of calcitonin   Continue PO hydration  "

## 2025-02-11 NOTE — PLAN OF CARE
[facility]    FACILITY TRANSFER ORDERS      Patient Name: Nora Torres  YOB: 1960    PCP: Hernandez Booker MD   PCP Address: [address] [place] [address]  PCP Phone Number: [phone]  PCP Fax: [phone]    Encounter Date: 02/11/2025    Admit to: [facility]    Vital Signs:  Routine    Diagnoses:   Active Hospital Problems    Diagnosis  POA    *Metastatic cancer [C79.9]  Yes    Acute hypoxemic respiratory failure [J96.01]  Yes    Mild malnutrition [E44.1]  Yes    Hypercalcemia of malignancy [E83.52]  Yes      Resolved Hospital Problems   No resolved problems to display.       Allergies:Review of patient's allergies indicates:  No Known Allergies    Diet: regular diet    Activities: Activity as tolerated    Goals of Care Treatment Preferences:  Code Status: Full Code    Health care agent: Herlinda Torres (sister)  Health care agent number: [phone]          What is most important right now is to focus on avoiding the hospital, remaining as independent as possible, symptom/pain control, improvement in condition but with limits to invasive therapies.  Accordingly, we have decided that the best plan to meet the patient's goals includes continuing with treatment.      CONSULTS:    Physical Therapy to evaluate and treat.  and Occupational Therapy to evaluate and treat.    Medications: Review discharge medications with patient and family and provide education.         Medication List        START taking these medications      dexAMETHasone 2 MG tablet  Commonly known as: DECADRON  Take 1 tablet (2 mg total) by mouth once daily.     nicotine 21 mg/24 hr  Commonly known as: NICODERM CQ  Place 1 patch onto the skin once daily.     oxyCODONE-acetaminophen  mg per tablet  Commonly known as: PERCOCET  Take 1 tablet by mouth every 8 (eight) hours as needed for Pain.            CHANGE how you take these medications      gabapentin 300 MG capsule  Commonly known  as: NEURONTIN  Take 1 capsule (300 mg total) by mouth 3 (three) times daily.  What changed: when to take this            CONTINUE taking these medications      amLODIPine 10 MG tablet  Commonly known as: NORVASC  Take 10 mg by mouth once daily.     levothyroxine 150 MCG tablet  Commonly known as: SYNTHROID  Take 150 mcg by mouth once daily.     lisinopriL-hydrochlorothiazide 20-25 mg Tab  Commonly known as: PRINZIDE,ZESTORETIC  Take 1 tablet by mouth once daily.     propranoloL 10 MG tablet  Commonly known as: INDERAL  Take 10 mg by mouth 2 (two) times daily.     VITAMIN C 500 MG tablet  Generic drug: ascorbic acid (vitamin C)  Take 500 mg by mouth once daily.                Immunizations Administered as of 2/11/2025       No immunizations on file.              _________________________________  Xavier Schofield MD  02/11/2025

## 2025-02-11 NOTE — PLAN OF CARE
"0822  Message sent to [facility] informing that the pt is medically stable to NM & requesting that the pt's sister, Adriana Mayers ([phone]), be contacted to complete admission paperwork.        02/11/25 0930   Rounds   Attendance Provider;Nurse    Discharge Plan A New Nursing Home placement - longterm care facility  ([facility])   Transition of Care Barriers Transportation     0930  CM was informed by Dr Schofield that the pt is medically stable to discharge to [facility] today. CM to bring the transportation packet to the nurse's station.     1030  CM informed the pt's sister, Mike Mayers ([phone]), via phone that the pt is medically stable to NM today, requested that admission paperwork for [facility] be completed today. Mike stated she is at work in aitainment & requested that the packet be emailed. CM instructed Mike to call [facility] & provided the contact info.     1040  Patient resting quietly in bed with her sister, Herlinda Torres ([phone]), at the bedside when CM rounded. Patient in agreement with plan to discharge to [facility] today & will need assistance with  van transportation.     E-tank & BSC for home use previously delivered to the bedside return to the [facility] DME office along with a signed "Delivery/Return" ticket.     1345  Message sent to [facility] questioning pt's admission status. Awaiting response.     1445  NH orders sent to [facility] via [employer]. Awaiting response.     1455  CM was informed by Rima that admission paperwork was sent to the pt's sister today & that the pt cannot be accepted to the NH today. Message sent to Dr Schofield informing of above.       Will continue to follow.   "

## 2025-02-11 NOTE — PROGRESS NOTES
[facility] Medicine  Progress Note    Patient Name: Nora Torres  MRN: [id number]  Patient Class: IP- Inpatient   Admission Date: 2/4/2025  Length of Stay: 6 days  Attending Physician: Xavier Schofield MD  Primary Care Provider: Hernandez Booker MD        Subjective     Principal Problem:Metastatic cancer        HPI:  Ms. Nora Torres is a 64 y.o. 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  The patient was previously admitted to the ED on October 20, 2024, for back pain, at which time an MRI revealed innumerable sclerotic bone lesions, a mild pathologic compression fracture of L5, and mild neural foraminal stenosis at L4/L5 and L5-S1. She was advised to follow up with her PCP, which she did; however, she did not disclose the findings of metastatic cancer.  Since then, the patient reports progressively worsening pain radiating to the right leg. She also notes increasing leg weakness and is unable to walk more than a few steps with a cane. Additionally, she reports a decreased appetite, a 30-pound unintentional weight loss, and reduced oral intake. However, she denies any loss of bowel or bladder control.     In the ED, patient was hypertensive 165/100, tachycardic to 100 , EKGs showed sinus tachycardia.  CT lumbar spine was done which showed innumerable lytic lesions throughout the lumbar spine and pelvis with pathological fracture of L4-L5 .  Labs were significant for hypokalemia 3.3, mildly elevated lactate 2.5, bicarb 22, elevated TSH 40 with low free T4 0.49, hypercalcemia 12.9.  Patient was given dexamethasone, morphine    Overview/Hospital Course:  2/5/25 Heme/Onc consult, recommend biopsy, will consult IR  2/6/25 IR plans for outpatient iliac biopsy  2/7/25 MRI brain wwo skull lesion  2/8: patient not able to walk her as  "her pain goes up to a pain level of 10 when standing, when sitting up on side the bed her oxygen sats were in the 80"s with therapy this morning.   2/9:family wants NH placement  2/10: pt agreeable to NH placement which is pending    Interval History:      NAEON  Pain well managed  Denies dyspnea  No f/c/n/v  Tolerating PO diet    Review of Systems   Constitutional:  Negative for chills and fever.   Respiratory:  Positive for shortness of breath.    Musculoskeletal:  Positive for back pain and gait problem.     Objective:     Vital Signs (Most Recent):  Temp: 98 °F (36.7 °C) (02/11/25 1220)  Pulse: 87 (02/11/25 1220)  Resp: 18 (02/11/25 1220)  BP: 124/81 (02/11/25 1220)  SpO2: (!) 93 % (02/11/25 1220) Vital Signs (24h Range):  Temp:  [97.7 °F (36.5 °C)-98.7 °F (37.1 °C)] 98 °F (36.7 °C)  Pulse:  [] 87  Resp:  [17-20] 18  SpO2:  [93 %-97 %] 93 %  BP: (118-143)/(77-87) 124/81     Weight: 51.6 kg (113 lb 12.1 oz)  Body mass index is 19.53 kg/m².    Intake/Output Summary (Last 24 hours) at 2/11/2025 1222  Last data filed at 2/11/2025 0830  Gross per 24 hour   Intake 360 ml   Output 1000 ml   Net -640 ml         Physical Exam  Constitutional:       Appearance: Normal appearance. She is ill-appearing.   HENT:      Head: Normocephalic and atraumatic.   Eyes:      Extraocular Movements: Extraocular movements intact.      Pupils: Pupils are equal, round, and reactive to light.   Cardiovascular:      Rate and Rhythm: Normal rate and regular rhythm.   Pulmonary:      Effort: Pulmonary effort is normal.      Breath sounds: Normal breath sounds.      Comments: L breast mass  Abdominal:      General: There is no distension.      Tenderness: There is no abdominal tenderness.   Skin:     General: Skin is warm and dry.      Coloration: Skin is not jaundiced.   Neurological:      General: No focal deficit present.      Mental Status: She is alert and oriented to person, place, and time.   Psychiatric:         Mood and Affect: " "Mood normal.         Behavior: Behavior normal.             Significant Labs: All pertinent labs within the past 24 hours have been reviewed.  CBC:   No results for input(s): "WBC", "HGB", "HCT", "PLT" in the last 48 hours.    CMP:   Recent Labs   Lab 02/10/25  0958      K 3.7      CO2 27      BUN 16   CREATININE 0.8   CALCIUM 11.7*   ANIONGAP 13         Significant Imaging: I have reviewed all pertinent imaging results/findings within the past 24 hours.    Assessment and Plan     * Metastatic cancer  Heme/Onc consult  Does not have an official diagnosis  IR consulted for biopsy and rec'd outpt biopsy      Acute hypoxemic respiratory failure  Patient with Hypoxic Respiratory failure which is Acute.  she is not on home oxygen. Supplemental oxygen was provided and noted-      .   Signs/symptoms of respiratory failure include- increased work of breathing, respiratory distress, and wheezing. Contributing diagnoses includes -  metastatic cancer  Labs and images were reviewed. Patient Has not had a recent ABG. Will treat underlying causes and adjust management of respiratory failure as follows- lasix    Continue diuresis with lasix    Hypercalcemia of malignancy  Hypercalcemia is likely due to Malignancy. The patient has the following symptoms due to their hypercalcemia: weakness. Their most recent calcium and albumin results are listed below.  Recent Labs     02/10/25  0958   CALCIUM 11.7*       Plan  - Obtain the following labs to work up the cause of hypercalcemia: PTHrP No results found for: "PTHRP" .   - Treat the hypercalcemia with: IV fluids ordered at a rate of 100 ml/hr.   - The patient's hypercalcemia is improving.     Asymptomatic    Plan:  - S/p 1 dose of calcitonin   Continue PO hydration    Mild malnutrition  Nutritionist ordered        VTE Risk Mitigation (From admission, onward)           Ordered     enoxaparin injection 30 mg  Daily         02/05/25 1536     IP VTE HIGH RISK PATIENT  " Once         02/04/25 1834     Place sequential compression device  Until discontinued         02/04/25 1834                    Discharge Planning   DRU: 2/11/2025     Code Status: Full Code   Medical Readiness for Discharge Date: 2/9/2025  Discharge Plan A: Home Health   Discharge Delays: None known at this time            Please place Justification for DME        Xavier Schofield MD  Department of Hospital Medicine   [facility]

## 2025-02-11 NOTE — PT/OT/SLP PROGRESS
Occupational Therapy   Treatment    Name: Nora Torres  MRN: [id number]  Admitting Diagnosis:  Metastatic cancer       Recommendations:     Discharge Recommendations: Low Intensity Therapy (with 24/7 supervision assist/Palliative Care)  Discharge Equipment Recommendations:  bedside commode, bath bench  Barriers to discharge:  None    Assessment:     Nora Torres is a 64 y.o. female with a medical diagnosis of Metastatic cancer.  Performance deficits affecting function are weakness, impaired endurance, impaired self care skills, impaired functional mobility, gait instability, impaired balance, decreased lower extremity function, decreased safety awareness, pain, impaired skin, decreased coordination.     Rehab Prognosis:  Fair; patient would benefit from acute skilled OT services to address these deficits and reach maximum level of function.       Plan:     Patient to be seen 3 x/week to address the above listed problems via self-care/home management, therapeutic activities, therapeutic exercises  Plan of Care Expires: 03/06/25  Plan of Care Reviewed with: patient, family    Subjective     Chief Complaint: fatigue  Patient/Family Comments/goals: return to PLOF  Pain/Comfort:  Pain Rating 1: 0/10  Pain Rating Post-Intervention 1: 0/10    Objective:     Communicated with: nurse prior to session.  Patient found HOB elevated with bed alarm, PureWick, oxygen upon OT entry to room.    General Precautions: Standard, fall    Orthopedic Precautions:spinal precautions  Braces: N/A  Respiratory Status: Room air    AMPAC 6 Click ADL: 17    Treatment & Education:  Educated on / discussed:  Purpose/role of OT  Energy conservation  PLB technique  Completing AROM of BUEs/BLEs x 10-15 reps  APs, QS, GS, sh flexion, punches, horizontal sh abd  All questions answered in OT scope    Patient left HOB elevated with all lines intact, call button in reach, bed alarm on, and family present    GOALS:   Multidisciplinary Problems        Occupational Therapy Goals          Problem: Occupational Therapy    Goal Priority Disciplines Outcome Interventions   Occupational Therapy Goal     OT, PT/OT     Description: Goals to be met by: 3/6/2025     Patient will increase functional independence with ADLs by performing:    UE Dressing with Stand-by Assistance.  LE Dressing with Minimal Assistance.  Grooming while seated with Set-up Assistance.  Toileting from bedside commode with Minimal Assistance for hygiene and clothing management.   Step transfer with Stand by Assistance  Toilet transfer to bedside commode with Stand by Assistance.                         DME Justifications:  No DME recommended requiring DME justifications    Time Tracking:     OT Date of Treatment: 02/11/25  OT Start Time: 1421  OT Stop Time: 1429  OT Total Time (min): 8 min    Billable Minutes:Therapeutic Activity 8    2/11/2025

## 2025-02-11 NOTE — PLAN OF CARE
Assumed care of patient. Patient in no apparent distress on room air. Medications administered via MAR. Bed alarm set, call light within reach, safety precautions maintained.    Problem: Adult Inpatient Plan of Care  Goal: Plan of Care Review  Outcome: Progressing     Problem: Coping Ineffective  Goal: Effective Coping  Outcome: Progressing     Problem: Family Coping Readiness for Enhanced  Goal: Effective Family Coping  Outcome: Progressing     Problem: Fall Injury Risk  Goal: Absence of Fall and Fall-Related Injury  Outcome: Progressing     Problem: Pain Acute  Goal: Optimal Pain Control and Function  Outcome: Progressing     Problem: Skin Injury Risk Increased  Goal: Skin Health and Integrity  Outcome: Progressing     Problem: Functional Deficit  Goal: Improved Balance and Postural Control  Outcome: Progressing  Goal: Optimal Cognitive Function  Outcome: Progressing  Goal: Optimal Coordination  Outcome: Progressing  Goal: Improved Muscle Strength  Outcome: Progressing  Goal: Improved Muscle Tone  Outcome: Progressing  Goal: Optimal Range of Motion  Outcome: Progressing  Goal: Compensation for Sensory Deficit  Outcome: Progressing

## 2025-02-12 NOTE — PT/OT/SLP PROGRESS
Physical Therapy Treatment    Patient Name:  Nora Torres   MRN:  [id number]    Recommendations:     Discharge Recommendations: Low Intensity Therapy  Discharge Equipment Recommendations: bedside commode, bath bench  Barriers to discharge:  decreased mobility,strength and endurance    Assessment:     Nora Torres is a 64 y.o. female admitted with a medical diagnosis of Metastatic cancer.  She presents with the following impairments/functional limitations: weakness, impaired endurance, impaired functional mobility, gait instability, decreased lower extremity function, decreased upper extremity function, decreased ROM, impaired coordination, impaired skin, impaired cardiopulmonary response to activity,pt with increased participation today and requires assistance with all mobility at this time,pt will benefit from low intensity therapy upon discharge.    Rehab Prognosis: Good; patient would benefit from acute skilled PT services to address these deficits and reach maximum level of function.    Recent Surgery: * No surgery found *      Plan:     During this hospitalization, patient to be seen 3 x/week to address the identified rehab impairments via gait training, therapeutic activities, therapeutic exercises, neuromuscular re-education and progress toward the following goals:    Plan of Care Expires:  03/08/25    Subjective     Chief Complaint: n/a  Patient/Family Comments/goals: pt agreeble to rx.  Pain/Comfort:  Pain Rating 1: 0/10      Objective:     Communicated with nsg prior to session.  Patient found supine with bed alarm, oxygen, PureWick, peripheral IV upon PT entry to room.     General Precautions: Standard, fall  Orthopedic Precautions: spinal precautions  Braces: N/A  Respiratory Status:  supplemental O2     Functional Mobility:  Bed Mobility:     Supine to Sit: minimum assistance  Sit to Supine: minimum assistance  Transfers:     Sit to Stand:  minimum assistance and X 2 trials with rolling walker  Gait:  amb ~3-4 steps up/back and to HOB with RW and Min A X 1 seated rest  Balance: fair standing balance with RW      AM-PAC 6 CLICK MOBILITY  Turning over in bed (including adjusting bedclothes, sheets and blankets)?: 3  Sitting down on and standing up from a chair with arms (e.g., wheelchair, bedside commode, etc.): 3  Moving from lying on back to sitting on the side of the bed?: 3  Moving to and from a bed to a chair (including a wheelchair)?: 3  Need to walk in hospital room?: 3  Climbing 3-5 steps with a railing?: 1  Basic Mobility Total Score: 16       Treatment & Education: le supine ex's x 10-12 reps inc: ap,hs,abd/add,slr,requests met.      Patient left supine with all lines intact, call button in reach, and bed alarm on..    GOALS: see general POC  Multidisciplinary Problems       Physical Therapy Goals          Problem: Physical Therapy    Goal Priority Disciplines Outcome Interventions   Physical Therapy Goal     PT, PT/OT Progressing    Description: Goals to be met by: 3/8/2025     Patient will increase functional independence with mobility by performin. Supine to sit with Stand-by Assistance  2. Sit to supine with Stand-by Assistance  3. Rolling to Left and Right with Stand-by Assistance.  4. Sit to stand transfer with Stand-by Assistance  5. Bed to chair transfer with Stand-by Assistance using Rolling Walker  6. Gait  x 50 feet with Stand-by Assistance using Rolling Walker.                          DME Justifications:  No DME recommended requiring DME justifications    Time Tracking:     PT Received On: 25  PT Start Time: 825     PT Stop Time: 849  PT Total Time (min): 24 min     Billable Minutes: Gait Training 14 and Therapeutic Exercise 10    Treatment Type: Treatment  PT/PTA: PTA     Number of PTA visits since last PT visit: 2025

## 2025-02-12 NOTE — NURSING
Discharge orders noted. Report called to [facility], spoke with nurse Joshua. Updated to history, scan/test results, POC. All questions answered. PIV removed. All personal belongings packed in room by family and with pt/family at time of discharge. Pt d/c unit via [email or web address]  transport van.

## 2025-02-12 NOTE — NURSING
Assumed care of patient from ROBIN Scott, patient is AAOX4, room air, vitals WNL, no current c/o pain, purewick and BSC in place, foam on left breast dated 2/10 clean dry and intact, nicotine patch KENJI, waffle mattress, SCD's refused, possible D/C to [facility] in AM, all safety precautions are in place, call bell and tray table are within reach of the patient and no additional questions or concerns from the patient at this time.

## 2025-02-12 NOTE — PLAN OF CARE
Problem: Adult Inpatient Plan of Care  Goal: Plan of Care Review  Outcome: Progressing     Problem: Fall Injury Risk  Goal: Absence of Fall and Fall-Related Injury  Outcome: Progressing     Problem: Pain Acute  Goal: Optimal Pain Control and Function  Outcome: Progressing     Problem: Coping Ineffective  Goal: Effective Coping  Outcome: Progressing     Problem: Oncology Care  Goal: Effective Coping  Outcome: Progressing  Goal: Improved Activity Tolerance  Outcome: Progressing  Goal: Optimal Oral Intake  Outcome: Progressing  Goal: Improved Oral Mucous Membrane Integrity  Outcome: Progressing  Goal: Optimal Pain Control and Function  Outcome: Progressing     Problem: Skin Injury Risk Increased  Goal: Skin Health and Integrity  Outcome: Progressing     Problem: Family Coping Readiness for Enhanced  Goal: Effective Family Coping  Outcome: Progressing     Problem: Functional Deficit  Goal: Improved Balance and Postural Control  Outcome: Progressing  Goal: Optimal Cognitive Function  Outcome: Progressing  Goal: Optimal Coordination  Outcome: Progressing  Goal: Improved Muscle Strength  Outcome: Progressing  Goal: Improved Muscle Tone  Outcome: Progressing  Goal: Optimal Range of Motion  Outcome: Progressing  Goal: Compensation for Sensory Deficit  Outcome: Progressing     Problem: Electrolyte Imbalance  Goal: Electrolyte Balance  Outcome: Progressing     Problem: Gas Exchange Impaired  Goal: Optimal Gas Exchange  Outcome: Progressing

## 2025-02-12 NOTE — PLAN OF CARE
Problem: Physical Therapy  Goal: Physical Therapy Goal  Description: Goals to be met by: 3/8/2025     Patient will increase functional independence with mobility by performin. Supine to sit with Stand-by Assistance  2. Sit to supine with Stand-by Assistance  3. Rolling to Left and Right with Stand-by Assistance.  4. Sit to stand transfer with Stand-by Assistance  5. Bed to chair transfer with Stand-by Assistance using Rolling Walker  6. Gait  x 50 feet with Stand-by Assistance using Rolling Walker.     Outcome: Progressing

## 2025-02-12 NOTE — PLAN OF CARE
0753  CM received a call from [employer] requesting the pt's SS#. Information provided & PASRR/142 sent via EPIC.      Will continue to follow.

## 2025-02-12 NOTE — PLAN OF CARE
[facility]    FACILITY TRANSFER ORDERS      Patient Name: Nora Torres  YOB: 1960    PCP: Hernandez Booker MD   PCP Address: [address] [place] [address]  PCP Phone Number: [phone]  PCP Fax: [phone]    Encounter Date: 02/12/2025    Admit to: [facility]    Vital Signs:  Routine    Diagnoses:   Active Hospital Problems    Diagnosis  POA    *Metastatic cancer [C79.9]  Yes    Acute hypoxemic respiratory failure [J96.01]  Yes    Mild malnutrition [E44.1]  Yes    Hypercalcemia of malignancy [E83.52]  Yes      Resolved Hospital Problems   No resolved problems to display.       Allergies:Review of patient's allergies indicates:  No Known Allergies    Diet: regular diet    Activities: Activity as tolerated    Goals of Care Treatment Preferences:  Code Status: Full Code    Health care agent: Herlinda Torres (sister)  Health care agent number: [phone]          What is most important right now is to focus on avoiding the hospital, remaining as independent as possible, symptom/pain control, improvement in condition but with limits to invasive therapies.  Accordingly, we have decided that the best plan to meet the patient's goals includes continuing with treatment.      CONSULTS:    Physical Therapy to evaluate and treat.  and Occupational Therapy to evaluate and treat.    Medications: Review discharge medications with patient and family and provide education.         Medication List        START taking these medications      dexAMETHasone 2 MG tablet  Commonly known as: DECADRON  Take 1 tablet (2 mg total) by mouth once daily.     nicotine 21 mg/24 hr  Commonly known as: NICODERM CQ  Place 1 patch onto the skin once daily.     oxyCODONE-acetaminophen  mg per tablet  Commonly known as: PERCOCET  Take 1 tablet by mouth every 8 (eight) hours as needed for Pain.            CHANGE how you take these medications      gabapentin 300 MG capsule  Commonly known  as: NEURONTIN  Take 1 capsule (300 mg total) by mouth 3 (three) times daily.  What changed: when to take this            CONTINUE taking these medications      amLODIPine 10 MG tablet  Commonly known as: NORVASC  Take 10 mg by mouth once daily.     levothyroxine 150 MCG tablet  Commonly known as: SYNTHROID  Take 150 mcg by mouth once daily.     lisinopriL-hydrochlorothiazide 20-25 mg Tab  Commonly known as: PRINZIDE,ZESTORETIC  Take 1 tablet by mouth once daily.     propranoloL 10 MG tablet  Commonly known as: INDERAL  Take 10 mg by mouth 2 (two) times daily.     VITAMIN C 500 MG tablet  Generic drug: ascorbic acid (vitamin C)  Take 500 mg by mouth once daily.                Immunizations Administered as of 2/12/2025       No immunizations on file.              _________________________________  Xavier Schofield MD  02/12/2025

## 2025-02-12 NOTE — PLAN OF CARE
0755  CM received a call from [employer] requesting the pt's SS#. Information provided & PASRR/142 sent via EPIC.     02/12/25 0940   Rounds   Attendance Provider   Discharge Plan A New Nursing Home placement - [facility] care facility  ([facility])   Transition of Care Barriers Transportation       0940  CM was informed by Dr Schofield that the pt is medically stable to discharge to [facility] today.     1020  CM was informed by Tiffany ([phone]) w/[facility] of printed percocet prescription needed prior to the pt's admission.     1100  Printed percocet prescription faxed to Tiffany (f [phone]). Awaiting response.     1110  CM was informed by Tiffany that the pt will be accepted for admission today to room 905B & requested that report be called to nurse Lewis at [phone]. Message sent to Dr Schofield informing of above. Awaiting final dc order.     1125  DC order noted. Message sent to CM Supervisors informing of [employer] scheduled with requested  at 1230. Transportation packet previously left at the nurse's station.     1130  Message sent to nurse Ramachandran, charge nurse Doris, & virtual nurse Ana informing that the pt is cleared to discharge, of transportation scheduled, & requesting that Madie call report.       Will continue to follow.

## 2025-02-13 NOTE — DISCHARGE SUMMARY
[facility] Medicine  Discharge Summary      Patient Name: Nora Torres  MRN: [id number]  [facility]: [id number]  Patient Class: IP- Inpatient  Admission Date: 2/4/2025  Hospital Length of Stay: 7 days  Discharge Date and Time: 2/12/2025  1:30 PM  Attending Physician: Daria att. providers found   Discharging Provider: Xavier Schofield MD  Primary Care Provider: Hernandez Booker MD    Primary Care Team: Networked reference to record PCT     HPI:   Ms. Nora Torres is a 64 y.o. 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  The patient was previously admitted to the ED on October 20, 2024, for back pain, at which time an MRI revealed innumerable sclerotic bone lesions, a mild pathologic compression fracture of L5, and mild neural foraminal stenosis at L4/L5 and L5-S1. She was advised to follow up with her PCP, which she did; however, she did not disclose the findings of metastatic cancer.  Since then, the patient reports progressively worsening pain radiating to the right leg. She also notes increasing leg weakness and is unable to walk more than a few steps with a cane. Additionally, she reports a decreased appetite, a 30-pound unintentional weight loss, and reduced oral intake. However, she denies any loss of bowel or bladder control.     In the ED, patient was hypertensive 165/100, tachycardic to 100 , EKGs showed sinus tachycardia.  CT lumbar spine was done which showed innumerable lytic lesions throughout the lumbar spine and pelvis with pathological fracture of L4-L5 .  Labs were significant for hypokalemia 3.3, mildly elevated lactate 2.5, bicarb 22, elevated TSH 40 with low free T4 0.49, hypercalcemia 12.9.  Patient was given dexamethasone, morphine    * No surgery found *      Hospital Course:     Presented with worsening back pain,  "progressive leg weakness, and unintentional weight loss. Imaging revealed numerous lytic bone lesions and a pathological L4-L5 fracture. She developed acute hypoxemic respiratory failure, requiring supplemental oxygen, and was found to have hypercalcemia likely secondary to malignancy. Heme/Onc was consulted, and an outpatient iliac biopsy was planned. Her pain was managed effectively, and she remained hemodynamically stable. Given her significant functional decline, family opted for nursing home placement, which was arranged prior to discharge.    2/5/25 Heme/Onc consult, recommend biopsy, will consult IR  2/6/25 IR plans for outpatient iliac biopsy  2/7/25 MRI brain wwo skull lesion  2/8: patient not able to walk her as her pain goes up to a pain level of 10 when standing, when sitting up on side the bed her oxygen sats were in the 80"s with therapy this morning.   2/9:family wants NH placement  2/10: pt agreeable to NH placement which is pending         Goals of Care Treatment Preferences:  Code Status: Full Code    Health care agent: Herlinda Torres (sister)  Health care agent number: [phone]          What is most important right now is to focus on avoiding the hospital, remaining as independent as possible, symptom/pain control, improvement in condition but with limits to invasive therapies.  Accordingly, we have decided that the best plan to meet the patient's goals includes continuing with treatment.      SDOH Screening:  The patient was screened for food insecurity, housing instability, transportation needs, utility difficulties, and interpersonal safety. The social determinant(s) of health identified as a concern this admission are:  Housing instability  Food insecurity  Transportation difficulties        Social Drivers of Health with Concerns     Food Insecurity: Food Insecurity Present (2/5/2025)   Housing Stability: High Risk (2/5/2025)   Transportation Needs: Unmet Transportation Needs (2/5/2025)      " "  Consults:   Consults (From admission, onward)          Status Ordering Provider     Inpatient consult to Interventional Radiology  Once        Provider:  (Not yet assigned)    Completed CHRISTOPHER PECK     Inpatient consult to Palliative Care  Once        Provider:  (Not yet assigned)    Completed TEVIN BUSCH     Inpatient consult to Social Work/Case Management  Once        Provider:  (Not yet assigned)    Completed KILO WHARTON     Inpatient consult to Registered Dietitian/Nutritionist  Once        Provider:  (Not yet assigned)    Completed BRUCE MURGUIA     Inpatient consult to Endocrinology  Once        Provider:  Veronika Aguilar MD    Completed BRUCE MURGUIA     Inpatient consult to Hematology/Oncology  Once        Provider:  (Not yet assigned)    Completed BRUCE MURGUIA            * Metastatic cancer  Heme/Onc consult  Does not have an official diagnosis  IR consulted for biopsy and rec'd outpt biopsy      Acute hypoxemic respiratory failure  Patient with Hypoxic Respiratory failure which is Acute.  she is not on home oxygen. Supplemental oxygen was provided and noted-      .   Signs/symptoms of respiratory failure include- increased work of breathing, respiratory distress, and wheezing. Contributing diagnoses includes -  metastatic cancer  Labs and images were reviewed. Patient Has not had a recent ABG. Will treat underlying causes and adjust management of respiratory failure as follows- lasix    Continue diuresis with lasix    Hypercalcemia of malignancy  Hypercalcemia is likely due to Malignancy. The patient has the following symptoms due to their hypercalcemia: weakness. Their most recent calcium and albumin results are listed below.  Recent Labs     02/10/25  0958   CALCIUM 11.7*       Plan  - Obtain the following labs to work up the cause of hypercalcemia: PTHrP No results found for: "PTHRP" .   - Treat the hypercalcemia with: IV fluids ordered at a rate of 100 ml/hr. " "  - The patient's hypercalcemia is improving.     Asymptomatic    Plan:  - S/p 1 dose of calcitonin   Continue PO hydration    Mild malnutrition  Nutritionist ordered        Final Active Diagnoses:    Diagnosis Date Noted POA    PRINCIPAL PROBLEM:  Metastatic cancer [C79.9] 02/05/2025 Yes    Acute hypoxemic respiratory failure [J96.01] 02/08/2025 Yes    Mild malnutrition [E44.1] 02/05/2025 Yes    Hypercalcemia of malignancy [E83.52] 02/05/2025 Yes      Problems Resolved During this Admission:       Discharged Condition: good    Disposition: Skilled Nursing Facility    Follow Up:   Follow-up Information       Hernandez Booker MD Follow up on 2/13/2025.    Specialty: Internal Medicine  Why: at 11:50 AM; appointment to establish with a new PCP  Contact information:  [address]  [place] [address]  [phone]               Alan Damian MD Follow up on 2/14/2025.    Specialties: Medical Oncology, Hematology and Oncology  Why: at 11:00 AM; hem/onc hospital follow up appointment  Contact information:  [address]  [address]  [place] [address]  [phone]               [facility] Follow up on 2/10/2025.    Why: will provide home health services  Contact information:  [address] [facility] [address] [facility] [address] [phone]             [facility] Follow up on 2/25/2025.    Specialty: Interventional Radiology  Why: Patient will be notified of an out-patietn iliac bone biopsy appontment  Contact information:  [address]  [facility] [address] [phone]  Additional information:  Clinic [place] - 6th Floor   Please park in [address] and use Clinic elevator                         Patient Instructions:      COMMODE FOR HOME USE     Order Specific Question Answer Comments   Type: Standard    Height: 5' 4" (1.626 m)    Weight: 48.7 kg (107 lb 5.8 oz)    Does patient have medical equipment at home? walker, rolling " "   Does patient have medical equipment at home? wheelchair    Does patient have medical equipment at home? canibrahima egan    Length of need (1-99 months): 99      BATH/SHOWER CHAIR FOR HOME USE     Order Specific Question Answer Comments   Height: 5' 4" (1.626 m)    Weight: 48.7 kg (107 lb 5.8 oz)    Does patient have medical equipment at home? walker, rolling    Does patient have medical equipment at home? wheelchair    Does patient have medical equipment at home? cane, straight    Length of need (1-99 months): 99    Type: With back      OXYGEN FOR HOME USE     Order Specific Question Answer Comments   Liter Flow 3    Duration With activity    Qualifying Test Performed at: Activity    Oxygen saturation at rest 92    Oxygen saturation with activity 83    Oxygen saturation with activity on oxygen 85    Portable mode: pulse dose acceptable    Mode: Portable concentrator    Route nasal cannula    Device: home concentrator with portable concentrator    Length of need (in months): 99 mos    Patient condition with qualifying saturation Other - List qualifying diagnosis and code breast cancer with mets   Select a diagnosis & list the code in the comments Cancer [334172]    Height: 5' 4" (1.626 m)    Weight: 52.7 kg (116 lb 2.9 oz)    Alternative treatment measures have been tried or considered and deemed clinically ineffective. Yes      Ambulatory referral/consult to Smoking Cessation Program   Standing Status: Future   Referral Priority: Routine Referral Type: Consultation   Referral Reason: Specialty Services Required   Requested Specialty: CTTS   Number of Visits Requested: 1     Diet Adult Regular     Notify your health care provider if you experience any of the following:  temperature >100.4     Notify your health care provider if you experience any of the following:  persistent nausea and vomiting or diarrhea     Notify your health care provider if you experience any of the following:  severe uncontrolled pain "     Activity as tolerated       Significant Diagnostic Studies: Labs: All labs within the past 24 hours have been reviewed    Pending Diagnostic Studies:       Procedure Component Value Units Date/Time    Iron and TIBC [[id number]] Collected: 02/04/25 2310    Order Status: Sent Lab Status: No result     Specimen: Blood            Medications:  Reconciled Home Medications:      Medication List        START taking these medications      dexAMETHasone 2 MG tablet  Commonly known as: DECADRON  Take 1 tablet (2 mg total) by mouth once daily.     nicotine 21 mg/24 hr  Commonly known as: NICODERM CQ  Place 1 patch onto the skin once daily.     oxyCODONE-acetaminophen  mg per tablet  Commonly known as: PERCOCET  Take 1 tablet by mouth every 4 (four) hours as needed for Pain.            CHANGE how you take these medications      gabapentin 300 MG capsule  Commonly known as: NEURONTIN  Take 1 capsule (300 mg total) by mouth 3 (three) times daily.  What changed: when to take this            CONTINUE taking these medications      amLODIPine 10 MG tablet  Commonly known as: NORVASC  Take 10 mg by mouth once daily.     levothyroxine 150 MCG tablet  Commonly known as: SYNTHROID  Take 150 mcg by mouth once daily.     lisinopriL-hydrochlorothiazide 20-25 mg Tab  Commonly known as: PRINZIDE,ZESTORETIC  Take 1 tablet by mouth once daily.     propranoloL 10 MG tablet  Commonly known as: INDERAL  Take 10 mg by mouth 2 (two) times daily.     VITAMIN C 500 MG tablet  Generic drug: ascorbic acid (vitamin C)  Take 500 mg by mouth once daily.              Indwelling Lines/Drains at time of discharge:   Lines/Drains/Airways       None                   Time spent on the discharge of patient: 35 minutes         Xavier Schofield MD  Department of Hospital Medicine  [facility]

## 2025-02-13 NOTE — PLAN OF CARE
[facility]  Discharge Final Note    Primary Care Provider: Hernandez Booker MD    Expected Discharge Date: 2/12/2025    Final Discharge Note (most recent)       Final Note - 02/13/25 0752          Final Note    Assessment Type Final Discharge Note (P)      Anticipated Discharge Disposition retirement Nursing Facility (P)    [facility]    Hospital Resources/Appts/Education Provided Appointments scheduled and added to AVS (P)         Post-Acute Status    Post-Acute Authorization Placement (P)      Post-Acute Placement Status Set-up Complete/Auth obtained (P)    [facility]    Discharge Delays None known at this time (P)    [facility]                      Contact Info       Hernandez Booker MD   Specialty: Internal Medicine   Relationship: PCP - General    [address]  [place] [address]   Phone: [phone]       Next Steps: Follow up on 2/13/2025    Instructions: at 11:50 AM; appointment to establish with a new PCP    Alan Damian MD   Specialty: Medical Oncology, Hematology and Oncology    [address]  [address]  [place] [address]   Phone: [phone]       Next Steps: Follow up on 2/14/2025    Instructions: at 11:00 AM; hem/onc hospital follow up appointment                 [facility]   Specialty: Interventional Radiology    [address]  [facility] [address]   Phone: [phone]       Next Steps: Follow up on 2/25/2025    Instructions: Patient will be notified of an out-patietn iliac bone biopsy appontment          Discharge summary faxed to Dr Hernandez Booker (PCP).

## 2025-02-24 NOTE — NURSING
spoke to sister, Karlie, who stated pt in NH and patient sleeping currently, left message with sister, reviewed NPO at midnight, arrive for 0800, arrange ride to and from procedure, bring list of home meds

## 2025-02-24 NOTE — NURSING
Spoke to Rosie: Patient should arrive by 8:00, have nothing to eat/drink past midnight, may have morning meds with small sip of water, send list of current home meds. Confirmed no allergies or blood thinners.

## 2025-02-25 NOTE — SEDATION DOCUMENTATION
Pt arrived to CT for bone biopsy. Pt oriented to unit and staff, Pt safely transferred from stretcher to procedural table. Fall risk reviewed and comfort measures utilized with interventions. Safety strap applied, position pillows to minimize pressure points. Blankets applied. Pt prepped and draped utilizing standard sterile technique. Patient placed on continuous monitoring, as required by sedation policy. Timeouts implemented utilizing standard universal time-out per department and facility policy. RN to remain at bedside with continuous monitoring. Pt resting comfortably. Denies pain/discomfort.

## 2025-02-25 NOTE — H&P
Radiology History & Physical      SUBJECTIVE:     Chief Complaint: bone lesions    History of Present Illness:  Nora Torres is a 64 y.o. female who presents for left iliac bone lesion biopsy under CT guidance  Past Medical History:   Diagnosis Date    Hypertension     Thyroid disease     hypothyroid     Past Surgical History:   Procedure Laterality Date     SECTION      x2       Home Meds:   Prior to Admission medications    Medication Sig Start Date End Date Taking? Authorizing Provider   amLODIPine (NORVASC) 10 MG tablet Take 10 mg by mouth once daily.   Yes Provider, Historical   ascorbic acid, vitamin C, (VITAMIN C) 500 MG tablet Take 500 mg by mouth once daily.   Yes Provider, Historical   dexAMETHasone (DECADRON) 2 MG tablet Take 1 tablet (2 mg total) by mouth once daily. 25  Yes La Booker MD   gabapentin (NEURONTIN) 300 MG capsule Take 1 capsule (300 mg total) by mouth 3 (three) times daily. 25  Yes La Booker MD   levothyroxine (SYNTHROID) 150 MCG tablet Take 150 mcg by mouth once daily. 25  Yes Provider, Historical   lisinopriL-hydrochlorothiazide (PRINZIDE,ZESTORETIC) 20-25 mg Tab Take 1 tablet by mouth once daily. 25  Yes Provider, Historical   nicotine (NICODERM CQ) 21 mg/24 hr Place 1 patch onto the skin once daily. 25  Yes La Booker MD   propranolol (INDERAL) 10 MG tablet Take 10 mg by mouth 2 (two) times daily.   Yes Provider, Historical     Anticoagulants/Antiplatelets: no anticoagulation    Allergies: Review of patient's allergies indicates:  No Known Allergies  Sedation History:  no adverse reactions          OBJECTIVE:     Vital Signs (Most Recent)  Temp: 98 °F (36.7 °C) (25 0844)  Pulse: 95 (2545)  Resp: 14 (2545)  BP: (!) 84/55 (2545)  SpO2: 97 % (2545)    Physical Exam:  ASA:3  Mallampati: 2    General: no acute distress  Mental Status: alert and oriented to person, place and  "time  HEENT: normocephalic, atraumatic  Chest: unlabored breathing  Heart: regular heart rate  Abdomen: nondistended  Extremity: moves all extremities    Laboratory  No results found for: "INR", "PT", "PTT"    Lab Results   Component Value Date    WBC 9.98 02/07/2025    HGB 12.8 02/07/2025    HCT 39.1 02/07/2025    MCV 86 02/07/2025     02/07/2025      Lab Results   Component Value Date     02/10/2025     02/10/2025    K 3.7 02/10/2025     02/10/2025    CO2 27 02/10/2025    BUN 16 02/10/2025    CREATININE 0.8 02/10/2025    CALCIUM 11.7 (H) 02/10/2025    MG 2.1 02/07/2025    ALT 8 (L) 02/07/2025    AST 40 02/07/2025    ALBUMIN 2.9 (L) 02/07/2025    BILITOT 0.3 02/07/2025       ASSESSMENT/PLAN:     Sedation Plan: moderate  Patient will undergo CT guided bone biopsy.    Nixon Ag MD  Interventional Radiology  Department of Radiology    "

## 2025-02-25 NOTE — NURSING
Report called to Rosie Nurse over at [facility]. All questions answered   [facility]

## 2025-02-25 NOTE — DISCHARGE INSTRUCTIONS
BIOPSY DISCHARGE EDUCATION       Information:   A biopsy is a procedure in which a biopsy needle is used to remove small amounts of tissue to evaluate for organ dysfunction or the presence of cancerous versus benign (non-cancerous) tissue.      What should I expect after the biopsy?      There may be some soreness around the biopsy site.    You may return to work after 24 hours unless your primary doctor instructs you otherwise.    You do not have any diet restrictions because of this procedure but should continue any that were given to you by any other doctors.    Continue all previously prescribed medications with the exception of Coumadin/warfarin which should be resumed after 24 hours. Pradaxa, Xarelto, Eliquis or Savaysa can be resumed after 48 hours.     Bathing & Wound Care:    You may shower after 24 hours; do not tub bath or submerge in water for 3 days (bath tub, hot tub, swimming pool, river or any other body of water).    Dressing to stay on 2-3 days (clean and dry)    If the biopsy site(s) become red, tender, swollen, or starts to drain, contact us.    Avoid heavy lifting and strenuous activity for 3 days.     Follow-up visit information:   Your ordering physician will typically get your biopsy results in three to five business days. If you do not hear from the ordering physician in 7 business days, please call his/her office to set up an appointment to review the results. Follow up with Interventional Radiology is not usually necessary.       Occasionally, a situation will require prompt attention and an emergency room visit is necessary:    Sudden chest pain, shortness of breath, or fainting    Increasing redness, swelling or drainage from the biopsy site    Increasing pain not relieved by medication    Bleeding or drainage from the needle site that is saturating the dressing    You have shaking, chills and/or a temperature over 100.3°F    New, sudden difficulty breathing    Drop in blood pressure,  and/or light-headed feeling    Interventional Radiology Clinic    [phone], choose prompt 3 Monday - Friday, 8:00 am - 4:00 pm    [phone] After hours and on holidays. Ask to speak with the interventional radiologist on call.

## 2025-02-25 NOTE — PROCEDURES
Radiology Post-Procedure Note    Pre Op Diagnosis: Bone lesions  Post Op Diagnosis: Same    Procedure: CT-guided left iliac bone lesion biopsy    Procedure performed by: Nixon Ag MD    Written Informed Consent Obtained: Yes  Specimen Removed: YES 6, 18g x 20 mm core biopsies placed in formalin  Estimated Blood Loss: Minimal    Findings:   Large lytic soft tissue lesion in the left iliac bone with associated cortical destruction. No complications.     Patient tolerated procedure well.    Nixon Ag MD  Interventional Radiology  Department of Radiology

## 2025-02-26 PROBLEM — N17.9 AKI (ACUTE KIDNEY INJURY): Status: ACTIVE | Noted: 2025-01-01

## 2025-02-26 PROBLEM — A41.9 SEPTIC SHOCK: Status: ACTIVE | Noted: 2025-01-01

## 2025-02-26 PROBLEM — S21.002A OPEN WOUND OF LEFT BREAST: Status: ACTIVE | Noted: 2025-01-01

## 2025-02-26 PROBLEM — J96.02 ACUTE RESPIRATORY FAILURE WITH HYPOXIA AND HYPERCARBIA: Status: ACTIVE | Noted: 2025-01-01

## 2025-02-26 PROBLEM — J18.9 CAP (COMMUNITY ACQUIRED PNEUMONIA): Status: ACTIVE | Noted: 2025-01-01

## 2025-02-26 PROBLEM — J90 PLEURAL EFFUSION: Status: ACTIVE | Noted: 2025-01-01

## 2025-02-26 PROBLEM — R65.21 SEPTIC SHOCK: Status: ACTIVE | Noted: 2025-01-01

## 2025-02-26 PROBLEM — N30.90 CYSTITIS: Status: ACTIVE | Noted: 2025-01-01

## 2025-02-26 NOTE — ASSESSMENT & PLAN NOTE
NANCY is likely due to pre-renal azotemia due to intravascular volume depletion. Baseline creatinine is  0.6-0.8 . Most recent creatinine and eGFR are listed below.  Recent Labs     02/26/25  1330   CREATININE 1.5*   EGFRNORACEVR 39*      Plan  - NANCY is  acute onset  - Avoid nephrotoxins and renally dose meds for GFR listed above  - Monitor urine output, serial BMP, and adjust therapy as needed  -IV Fluids NS @ 100cc  -due to hypercalcemia/shock

## 2025-02-26 NOTE — PHARMACY MED REC
"      [facility]           Pharmacy  Admission Medication History     The home medication history was taken by Lilliam Sutton.      Medication history obtained from Medications listed below were obtained from: Nursing home. Medications verified by [facility] med list.    Based on information gathered for medication list, you may go to "Admission" then "Reconcile Home Medications" tabs to review and/or act upon those items.     The home medication list has been updated by the Pharmacy department.   Please read ALL comments highlighted in yellow.   Please address this information as you see fit.    Feel free to contact us if you have any questions or require assistance.        Current Facility-Administered Medications on File Prior to Encounter   Medication Dose Route Frequency Provider Last Rate Last Admin    [DISCONTINUED] LIDOcaine (PF) 10 mg/ml (1%) injection 10 mg  1 mL Other Once Samia Fitzpatrick PA-C         Current Outpatient Medications on File Prior to Encounter   Medication Sig Dispense Refill    amLODIPine (NORVASC) 10 MG tablet Take 10 mg by mouth once daily.      ascorbic acid, vitamin C, (VITAMIN C) 500 MG tablet Take 500 mg by mouth once daily.      dexAMETHasone (DECADRON) 2 MG tablet Take 1 tablet (2 mg total) by mouth once daily. 20 tablet 1    gabapentin (NEURONTIN) 300 MG capsule Take 1 capsule (300 mg total) by mouth 3 (three) times daily. 90 capsule 1    levothyroxine (SYNTHROID) 150 MCG tablet Take 150 mcg by mouth once daily.      lisinopriL-hydrochlorothiazide (PRINZIDE,ZESTORETIC) 20-25 mg Tab Take 1 tablet by mouth once daily.      nicotine (NICODERM CQ) 21 mg/24 hr Place 1 patch onto the skin once daily. 30 patch 3    oxyCODONE-acetaminophen (PERCOCET)  mg per tablet Take 1 tablet by mouth every 4 (four) hours as needed for Pain.      propranolol (INDERAL) 10 MG tablet Take 10 mg by mouth 2 (two) times daily.         Please address this " information as you see fit.  Feel free to contact us if you have any questions or require assistance.    Lilliam Sutton  [phone]            .

## 2025-02-26 NOTE — ED NOTES
Pt came in via EMS from [facility] with altered mental status. Pt was supposed to start hospice today but family changed their mind. Pt is currently a full code. Pt is slow to arousal only to pain. EMS stated pt has not had a urine output since yesterday.

## 2025-02-26 NOTE — ASSESSMENT & PLAN NOTE
Patient has metastatic cancer of unknown primary. The cancer has metastasized to bones. The patient is not under the care of an outpatient oncologist. The patient is not undergoing active chemotherapy. .Their staging information is listed below.    Cancer Staging   No matching staging information was found for the patient.    -Heme/Onc consulted  -f/u on biopsy results

## 2025-02-26 NOTE — ASSESSMENT & PLAN NOTE
This patient has shock. The type of shock is  septic . The patient has the following evidence of shock: persistent hypotension, NANCY, altered mental status, and hypoxia. The patient will be admitted to an intensive care unit, they will be treated with IV fluids, ABX, and pressors.

## 2025-02-26 NOTE — HPI
Nora Torres a 64-year old female with PMHx of HTN, hypothyroidism, L-breast CA, and lytic mets came into the ER from NH with AMS.Yesterday, the patient had a CT guided L-illiac bone lesion biopsy with IR then was discharged back to [facility]. Today patient returned with AMS and starring gaze according to ER doctor and family reports no urinary output since yesterday. During my exam, patient eyes upon to verbal stimuli, but no comprehensible response. Patient doesn't follow commands, unable to voice if she is in pain, but is moaning while at the beside. Patient ROS + hypoxia requiring 15L non-rebreather, course lungs with crackles L>R, L-breast necrotic with open wound with purulent drainage (see media), and AMS. Patient was recently discharge on 2/14/25 for back back pain with compression fx which lead to outpatient biopsy, then opted to discharge to NH.     ED course:   Labs:   WBC 16.47, K 3.1, CO2 16, Anion Gap 18, BUN 61, Cr 1.5, eGFR 39, Glucose 175, Ca 15.5, , AST/ALT 71/48, , Troponin 0.181.  Diagnostics:  CT of head without contrast: No acute process seen. There are few lucent areas within the posterior skull 1 of which is suspicious for an aggressive process measuring 1.5 cm.  This replaces the type low echo marrow and is demonstrating destruction of the outer and inner table of the skull of the left parieto-occipital junction.  This is worrisome for metastatic disease, or multiple myeloma.  Langerhans cell histiocytosis is thought to be less likely.   Chest xray: Possible right middle lobe pneumonia and a right pleural effusion.   Medication: albuterol-iprotopium, LR 1539cc, Narcan 1mg, zosyn 4.5g, vancomycin 1250mg, Norepinephrine  Vitals: 98.2F, /42-97, (), , 27-49, 86-92%

## 2025-02-26 NOTE — ASSESSMENT & PLAN NOTE
Left breast wound necrotic, open with purulent drainage  -heme/onc consulted  -pain management  -wound care consulted

## 2025-02-26 NOTE — ASSESSMENT & PLAN NOTE
Patient has a diagnosis of pneumonia. The cause of the pneumonia is suspected to be bacterial in etiology but organism is not known. The pneumonia is  acute onset . The patient has the following signs/symptoms of pneumonia: persistent hypoxia  and shortness of breath. The patient does have a current oxygen requirement and the patient does not have a home oxygen requirement. I have reviewed the pertinent imaging. The following cultures have been collected: Blood cultures The culture results are listed below.     Current antimicrobial regimen consists of the antibiotics listed below. Will monitor patient closely and Adjust treatment plan as follows will de-escalate ABX based on pending cultures    Antibiotics (From admission, onward)      Start     Stop Route Frequency Ordered    02/26/25 1800  cefTRIAXone injection 1 g         -- IV Every 24 hours (non-standard times) 02/26/25 1647    02/26/25 1800  azithromycin (ZITHROMAX) 500 mg in 0.9% NaCl 250 mL IVPB (admixture device)         03/03/25 1759 IV Every 24 hours (non-standard times) 02/26/25 1657    02/26/25 1300  vancomycin 1,250 mg in 0.9% NaCl 250 mL IVPB (admixture device)  (Sepsis Workup)         -- IV Once 02/26/25 1259            Microbiology Results (last 7 days)       Procedure Component Value Units Date/Time    Urine culture [[id number]] Collected: 02/26/25 1556    Order Status: No result Specimen: Urine Updated: 02/26/25 1620    Influenza A & B by Molecular [[id number]] Collected: 02/26/25 1259    Order Status: Sent Specimen: Nasopharyngeal Swab Updated: 02/26/25 1450    Blood culture x two cultures. Draw prior to antibiotics. [[id number]] Collected: 02/26/25 1348    Order Status: Sent Specimen: Blood from Line, Jugular, External Right     Blood culture x two cultures. Draw prior to antibiotics. [[id number]] Collected: 02/26/25 1337    Order Status: Sent Specimen: Blood from Line, Jugular, External Right

## 2025-02-26 NOTE — ASSESSMENT & PLAN NOTE
Patient found to have moderate pleural effusion on imaging. I have not personally reviewed and interpreted the following imaging: Xray. A thoracentesis was deferred. Most likely etiology includes Pneumonia. Management to include  treatment of infection

## 2025-02-26 NOTE — NURSING
Pr transferred to , on 15L nonrebreather face mask. Pt is easy to arouse but doesn't follow commands or answer any orientation questions. Pt arrived with non blanchable redness to sacrum, mepilex put in place

## 2025-02-26 NOTE — H&P
[facility] Medicine  History & Physical    Patient Name: Nora Torres  MRN: [id number]  Patient Class: IP- Inpatient  Admission Date: 2/26/2025  Attending Physician: Boy Root MD   Primary Care Provider: Hernandez Booker MD         Patient information was obtained from patient, past medical records, and ER records.     Subjective:     Principal Problem:Acute respiratory failure with hypoxia    Chief Complaint:   Chief Complaint   Patient presents with    Altered Mental Status     Brought in by EMS from [facility]. Pt reported to have AMS since Sunday and progressively getting worse. Pt was supposed to start hospice today but family rescinded. Nursing home reports decreased appetite and output since Sunday. Last urination yesterday. EMS reports slow arousal to painful stimuli.        HPI: Nora Torres a 64-year old female with PMHx of HTN, hypothyroidism, L-breast CA, and lytic mets came into the ER from NH with AMS.Yesterday, the patient had a CT guided L-illiac bone lesion biopsy with IR then was discharged back to [facility]. Today patient returned with AMS and starring gaze according to ER doctor and family reports no urinary output since yesterday. During my exam, patient eyes upon to verbal stimuli, but no comprehensible response. Patient doesn't follow commands, unable to voice if she is in pain, but is moaning while at the beside. Patient ROS + hypoxia requiring 15L non-rebreather, course lungs with crackles L>R, L-breast necrotic with open wound with purulent drainage (see media), and AMS. Patient was recently discharge on 2/14/25 for back back pain with compression fx which lead to outpatient biopsy, then opted to discharge to NH.     ED course:   Labs:   WBC 16.47, K 3.1, CO2 16, Anion Gap 18, BUN 61, Cr 1.5, eGFR 39, Glucose 175, Ca 15.5, , AST/ALT 71/48, , Troponin 0.181.  Diagnostics:  CT of head without contrast: No acute process seen. There  are few lucent areas within the posterior skull 1 of which is suspicious for an aggressive process measuring 1.5 cm.  This replaces the type low echo marrow and is demonstrating destruction of the outer and inner table of the skull of the left parieto-occipital junction.  This is worrisome for metastatic disease, or multiple myeloma.  Langerhans cell histiocytosis is thought to be less likely.   Chest xray: Possible right middle lobe pneumonia and a right pleural effusion.   Medication: albuterol-iprotopium, LR 1539cc, Narcan 1mg, zosyn 4.5g, vancomycin 1250mg, Norepinephrine  Vitals: 98.2F, /42-97, (), , 27-49, 86-92%       Past Medical History:   Diagnosis Date    Hypertension     Thyroid disease     hypothyroid       Past Surgical History:   Procedure Laterality Date     SECTION      x2       Review of patient's allergies indicates:  No Known Allergies    Current Facility-Administered Medications on File Prior to Encounter   Medication    [DISCONTINUED] LIDOcaine (PF) 10 mg/ml (1%) injection 10 mg     Current Outpatient Medications on File Prior to Encounter   Medication Sig    amLODIPine (NORVASC) 10 MG tablet Take 10 mg by mouth once daily.    ascorbic acid, vitamin C, (VITAMIN C) 500 MG tablet Take 500 mg by mouth once daily.    dexAMETHasone (DECADRON) 2 MG tablet Take 1 tablet (2 mg total) by mouth once daily.    gabapentin (NEURONTIN) 300 MG capsule Take 1 capsule (300 mg total) by mouth 3 (three) times daily.    levothyroxine (SYNTHROID) 150 MCG tablet Take 150 mcg by mouth once daily.    lisinopriL-hydrochlorothiazide (PRINZIDE,ZESTORETIC) 20-25 mg Tab Take 1 tablet by mouth once daily.    nicotine (NICODERM CQ) 21 mg/24 hr Place 1 patch onto the skin once daily.    oxyCODONE-acetaminophen (PERCOCET)  mg per tablet Take 1 tablet by mouth every 4 (four) hours as needed for Pain.    propranolol (INDERAL) 10 MG tablet Take 10 mg by mouth 2 (two) times daily.     Family History     None       Tobacco Use    Smoking status: Every Day     Current packs/day: 0.90     Average packs/day: 0.9 packs/day for 48.2 years (43.3 ttl pk-yrs)     Types: Cigarettes     Start date: 1977    Smokeless tobacco: Not on file    Tobacco comments:     Pt is a (just under) 1 pk/day cigarette smoker x 48 yrs. Order requested for 21 mg nicotine patch Q day. Handout provided.   Substance and Sexual Activity    Alcohol use: No    Drug use: Not Currently    Sexual activity: Not Currently     Review of Systems   Reason unable to perform ROS: patient not elif to verbally repsond to quetions.     Objective:     Vital Signs (Most Recent):  Temp: 98.2 °F (36.8 °C) (02/26/25 1230)  Pulse: (!) 112 (02/26/25 1623)  Resp: (!) 28 (02/26/25 1623)  BP: 102/62 (02/26/25 1623)  SpO2: (!) 92 % (02/26/25 1623) Vital Signs (24h Range):  Temp:  [98.2 °F (36.8 °C)] 98.2 °F (36.8 °C)  Pulse:  [] 112  Resp:  [26-31] 28  SpO2:  [86 %-92 %] 92 %  BP: ()/(42-97) 102/62     Weight: 51.3 kg (113 lb)  Body mass index is 19.4 kg/m².     Physical Exam  Vitals reviewed.   Constitutional:       General: She is in acute distress.      Appearance: She is ill-appearing. She is not toxic-appearing.      Comments: Moans but no verbal response   HENT:      Head: Normocephalic and atraumatic.      Nose: Nose normal.      Comments: Upper and lower dentures     Mouth/Throat:      Mouth: Mucous membranes are dry.   Eyes:      Pupils: Pupils are equal, round, and reactive to light.   Cardiovascular:      Rate and Rhythm: Tachycardia present.   Pulmonary:      Effort: Respiratory distress present.      Breath sounds: Wheezing present.   Abdominal:      General: There is no distension.      Tenderness: There is no abdominal tenderness.   Genitourinary:     Vagina: Vaginal discharge present.   Musculoskeletal:         General: No swelling or tenderness.      Cervical back: Normal range of motion.      Right lower leg: No edema.      Left lower leg: No  "edema.   Skin:     General: Skin is warm.      Capillary Refill: Capillary refill takes 2 to 3 seconds.      Coloration: Skin is pale.      Comments: L-breast necrotic wound with purulent drainage   Neurological:      General: No focal deficit present.      Mental Status: She is disoriented.      Motor: Weakness present.   Psychiatric:         Mood and Affect: Mood normal.         Behavior: Behavior normal.              CRANIAL NERVES     CN III, IV, VI   Pupils are equal, round, and reactive to light.       Significant Labs: All pertinent labs within the past 24 hours have been reviewed.  ABGs:   Recent Labs   Lab 02/26/25  1332   PH 7.388   PCO2 32.8*   HCO3 19.8*   POCSATURATED 70.1*   PO2 40.5*     BMP:   Recent Labs   Lab 02/26/25  1330   *      K 3.1*      CO2 16*   BUN 61*   CREATININE 1.5*   CALCIUM 15.5*     CBC:   Recent Labs   Lab 02/26/25  1330 02/26/25  1332   WBC 16.47*  --    HGB 12.6  --    HCT 39.4 40.4     --      CMP:   Recent Labs   Lab 02/26/25  1330      K 3.1*      CO2 16*   *   BUN 61*   CREATININE 1.5*   CALCIUM 15.5*   PROT 6.4   ALBUMIN 2.5*   BILITOT 1.8*   ALKPHOS 225*   AST 71*   ALT 48*   ANIONGAP 18*     Lipid Panel: No results for input(s): "CHOL", "HDL", "LDLCALC", "TRIG", "CHOLHDL" in the last 48 hours.  Magnesium: No results for input(s): "MG" in the last 48 hours.  POCT Glucose:   Recent Labs   Lab 02/26/25  1425   POCTGLUCOSE 172*     Respiratory Culture: No results for input(s): "GSRESP", "RESPIRATORYC" in the last 48 hours.  Troponin:   Recent Labs   Lab 02/26/25  1330   TROPONINI 0.181*     TSH:   Recent Labs   Lab 02/04/25  1641   TSH 40.635*     Urine Studies:   Recent Labs   Lab 02/26/25  1556   COLORU Yellow   APPEARANCEUA Hazy*   PHUR 5.0   SPECGRAV 1.020   PROTEINUA 1+*   GLUCUA Negative   KETONESU 1+*   BILIRUBINUA Negative   OCCULTUA 2+*   NITRITE Positive*   UROBILINOGEN Negative   LEUKOCYTESUR 3+*   RBCUA 71*   WBCUA 43* "   BACTERIA Few*   SQUAMEPITHEL 3   HYALINECASTS 0       Significant Imaging: I have reviewed all pertinent imaging results/findings within the past 24 hours.  Assessment/Plan:     * Acute respiratory failure with hypoxia  Patient with Hypoxic Respiratory failure which is Acute.  she is not on home oxygen. Supplemental oxygen was provided and noted-      .   Signs/symptoms of respiratory failure include- respiratory distress, wheezing, and lethargy. Contributing diagnoses includes - Pleural effusion and Pneumonia Labs and images were reviewed. Patient Has recent ABG, which has been reviewed. Will treat underlying causes and adjust management of respiratory failure as follows    -supple supplemental O2  -considering BiPap to maintain O2    Open wound of left breast     Left breast wound necrotic, open with purulent drainage  -heme/onc consulted  -pain management  -wound care consulted      Pleural effusion  Patient found to have  unknown  pleural effusion on imaging. I have not personally reviewed and interpreted the following imaging: Xray. A thoracentesis was deferred. Most likely etiology includes Pneumonia. Management to include  will consider diuresing and or thoracentesis if needed      NANCY (acute kidney injury)  NANCY is likely due to pre-renal azotemia due to intravascular volume depletion. Baseline creatinine is  0.6-0.8 . Most recent creatinine and eGFR are listed below.  Recent Labs     02/26/25  1330   CREATININE 1.5*   EGFRNORACEVR 39*      Plan  - NANCY is  acute onset  - Avoid nephrotoxins and renally dose meds for GFR listed above  - Monitor urine output, serial BMP, and adjust therapy as needed  -IV Fluids NS @ 100cc    Cystitis  Patient has AMS, WBC 16.47, UA positive for leukocytes, nitrates, WBC  -Abx ceftriaxone 1g 24hr      Septic shock  This patient has shock. The type of shock is  septic . The patient has the following evidence of shock: persistent hypotension, NANCY, altered mental status, and  hypoxia. The patient will be admitted to an intensive care unit, they will be treated with IV fluids, ABX, and pressors.    CAP (community acquired pneumonia)  Patient has a diagnosis of pneumonia. The cause of the pneumonia is suspected to be bacterial in etiology but organism is not known. The pneumonia is  acute onset . The patient has the following signs/symptoms of pneumonia: persistent hypoxia  and shortness of breath. The patient does have a current oxygen requirement and the patient does not have a home oxygen requirement. I have reviewed the pertinent imaging. The following cultures have been collected: Blood cultures The culture results are listed below.     Current antimicrobial regimen consists of the antibiotics listed below. Will monitor patient closely and Adjust treatment plan as follows will de-escalate ABX based on pending cultures    Antibiotics (From admission, onward)      Start     Stop Route Frequency Ordered    02/26/25 1800  cefTRIAXone injection 1 g         -- IV Every 24 hours (non-standard times) 02/26/25 1647    02/26/25 1800  azithromycin (ZITHROMAX) 500 mg in 0.9% NaCl 250 mL IVPB (admixture device)         03/03/25 1759 IV Every 24 hours (non-standard times) 02/26/25 1657    02/26/25 1300  vancomycin 1,250 mg in 0.9% NaCl 250 mL IVPB (admixture device)  (Sepsis Workup)         -- IV Once 02/26/25 1259            Microbiology Results (last 7 days)       Procedure Component Value Units Date/Time    Urine culture [[id number]] Collected: 02/26/25 1556    Order Status: No result Specimen: Urine Updated: 02/26/25 1620    Influenza A & B by Molecular [[id number]] Collected: 02/26/25 1259    Order Status: Sent Specimen: Nasopharyngeal Swab Updated: 02/26/25 1450    Blood culture x two cultures. Draw prior to antibiotics. [[id number]] Collected: 02/26/25 1348    Order Status: Sent Specimen: Blood from Line, Jugular, External Right     Blood culture x two cultures. Draw prior to antibiotics.  [[id number]] Collected: 02/26/25 1337    Order Status: Sent Specimen: Blood from Line, Jugular, External Right             Hypercalcemia  Hypercalcemia is likely due to Malignancy. The patient has the following symptoms due to their hypercalcemia: weakness and encephalopathy. Their most recent calcium and albumin results are listed below.  Recent Labs     02/26/25  1330   CALCIUM 15.5*   ALBUMIN 2.5*     Plan  - Obtain the following labs to work up the cause of hypercalcemia: alkaline phosphatase   Alkaline Phosphatase   Date Value Ref Range Status   02/26/2025 225 (H) 40 - 150 U/L Final    .   - Treat the hypercalcemia with: IV fluids ordered at a rate of 100 ml/hr.   - The patient's hypercalcemia is worsening. Will continue current treatment.       Metastatic cancer  Patient has metastatic cancer of unknown primary. The cancer has metastasized to bones. The patient is not under the care of an outpatient oncologist. The patient is not undergoing active chemotherapy. .Their staging information is listed below.   Cancer Staging   No matching staging information was found for the patient.    -Heme/Onc consulted      I spent 30 min with the patient with Dr. Shane Castano with Hospital Medicine services.   VTE Risk Mitigation (From admission, onward)           Ordered     heparin (porcine) injection 5,000 Units  Every 8 hours         02/26/25 1616     IP VTE HIGH RISK PATIENT  Once         02/26/25 1616     Place sequential compression device  Until discontinued         02/26/25 1616                                    Nia Means DNP  Department of Hospital Medicine  [facility] Emergency Dept

## 2025-02-26 NOTE — ASSESSMENT & PLAN NOTE
Hypercalcemia is likely due to Malignancy. The patient has the following symptoms due to their hypercalcemia: weakness and encephalopathy. Their most recent calcium and albumin results are listed below.  Recent Labs     02/26/25  1330   CALCIUM 15.5*   ALBUMIN 2.5*     Plan  - Obtain the following labs to work up the cause of hypercalcemia: alkaline phosphatase   Alkaline Phosphatase   Date Value Ref Range Status   02/26/2025 225 (H) 40 - 150 U/L Final    .   - Treat the hypercalcemia with: IV fluids ordered at a rate of 100 ml/hr, Bisphosphonates, and Calcitonin.   - The patient's hypercalcemia is improving but not resolved.

## 2025-02-26 NOTE — ED PROVIDER NOTES
Encounter Date: 2025       History     Chief Complaint   Patient presents with    Altered Mental Status     Brought in by [facility] from [facility]. Pt reported to have AMS since  and progressively getting worse. Pt was supposed to start hospice today but family rescinded. Nursing home reports decreased appetite and output since . Last urination yesterday. EMS reports slow arousal to painful stimuli.     Patient is a 64-year-old female who presents the emergency room with altered mental status.  She has a past medical history of breast cancer and family states she is not a candidate for chemotherapy or radiation.  She was diagnosed in October the patient has not had any treatment.  The patient also has bone malignancy of unknown primary origin concerning for potential multiple myeloma or lymphoma.  She had a biopsy done yesterday.  Family states she was not appearing well for the past 2 days.  She presents the emergency room today with altered mental status via EMS.  She can not provide a history.  Her vital signs were difficult to obtain on arrival.  EMS reported the family is considering hospice but has not made that decision as of yet and the patient is still full code.      Review of patient's allergies indicates:  No Known Allergies  Past Medical History:   Diagnosis Date    Hypertension     Thyroid disease     hypothyroid     Past Surgical History:   Procedure Laterality Date     SECTION      x2     No family history on file.  Social History[1]  Review of Systems   Unable to perform ROS: Acuity of condition   Respiratory:  Positive for shortness of breath.    Psychiatric/Behavioral:  Positive for confusion.        Physical Exam     Initial Vitals [25 1230]   BP Pulse Resp Temp SpO2   (!) 70/42 (!) 133 (!) 30 98.2 °F (36.8 °C) (!) 90 %      MAP       --         Physical Exam    Nursing note and vitals reviewed.  Constitutional:   Patient is a very lethargic but not  obtunded.  She is maintaining respirations with the respiratory status for proximally 40.  She is altered and confused and encephalopathic on exam   HENT:   Head: Normocephalic and atraumatic.   Eyes:   Slightly dysconjugate gaze but equal pupils.  They are not pinpoint.  She will not track.  No seizure-like activity.  No forced deviation.   Neck: Neck supple. No JVD present.   Pulmonary/Chest: She is in respiratory distress (appears tachypneic). She has wheezes.   Patient has large breast mass in the left breast with a draining wound.  There is thickening and darkening of the skin   Abdominal: Abdomen is soft. There is no abdominal tenderness.   Musculoskeletal:         General: No edema.      Cervical back: Neck supple.      Comments: Patient appears to be able to move both upper and lower extremities     Neurological: GCS eye subscore is 3. GCS verbal subscore is 3. GCS motor subscore is 4.   GCS 10 eyes 3 verbal 3 motor 4   Skin: No rash noted.         ED Course   Central Line    Date/Time: 2/26/2025 7:23 PM    Performed by: Boy Root MD  Authorized by: Boy Root MD    Location procedure was performed:  [facility] EMERGENCY DEPARTMENT  Consent Done ?:  Emergent Situation  Time out complete?: Verified correct patient, procedure, equipment, staff, and site/side    Indications:  Med administration, vascular access and hemodynamic monitoring  Anesthesia:  Local infiltration  Local anesthetic:  Lidocaine 1% without epinephrine  Preparation:  Skin prepped with ChloraPrep  Skin prep agent dried: Skin prep agent completely dried prior to procedure    Sterile barriers: All five maximal sterile barriers used - gloves, gown, cap, mask and large sterile sheet    Hand hygiene: Hand hygiene performed immediately prior to central venous catheter insertion    Location:  Right internal jugular  Catheter type:  Triple lumen  Catheter size:  7 Fr  Inserted Catheter Length (cm):  16  Ultrasound guidance: Yes    Vessel  Caliber:  Small   patent  VESSEL COMPRESSIBILTY: Almost flat vessel.  Needle advanced into vessel with real time ultrasound guidance.    Guidewire confirmed in vessel.    Steril sheath on probe.    Sterile gel used.  Manometry: No    Number of attempts:  1  Securement:  Line sutured, chlorhexidine patch, sterile dressing applied and blood return through all ports  Complications: No    Guidewire: guidewire removed intact    XRay:  Placement verified by x-ray, no pneumothorax on x-ray and successful placement  Adverse Events:  NoneTermination Site: superior vena cava  Critical Care    Date/Time: 2/26/2025 7:24 PM    Performed by: Boy Root MD  Authorized by: Boy Root MD  Direct patient critical care time: 40 minutes  Additional history critical care time: 5 minutes  Ordering / reviewing critical care time: 5 minutes  Documentation critical care time: 10 minutes  Consulting other physicians critical care time: 10 minutes  Consult with family critical care time: 10 minutes  Total critical care time (exclusive of procedural time) : 80 minutes  Critical care was necessary to treat or prevent imminent or life-threatening deterioration of the following conditions: sepsis, shock and respiratory failure.  Critical care was time spent personally by me on the following activities: discussions with consultants, evaluation of patient's response to treatment, examination of patient, obtaining history from patient or surrogate, ordering and performing treatments and interventions, ordering and review of laboratory studies, ordering and review of radiographic studies, pulse oximetry, re-evaluation of patient's condition and review of old charts.        Labs Reviewed   CBC W/ AUTO DIFFERENTIAL - Abnormal       Result Value    WBC 16.47 (*)     RBC 4.54      Hemoglobin 12.6      Hematocrit 39.4      MCV 87      MCH 27.8      MCHC 32.0      RDW 17.9 (*)     Platelets 342      MPV 9.5      Immature Granulocytes 0.9 (*)      Gran # (ANC) 11.4 (*)     Immature Grans (Abs) 0.14 (*)     Lymph # 3.7      Mono # 1.3 (*)     Eos # 0.0      Baso # 0.03      nRBC 0      Gran % 68.8      Lymph % 22.2      Mono % 7.9      Eosinophil % 0.0      Basophil % 0.2      Differential Method Automated     COMPREHENSIVE METABOLIC PANEL - Abnormal    Sodium 144      Potassium 3.1 (*)     Chloride 110      CO2 16 (*)     Glucose 175 (*)     BUN 61 (*)     Creatinine 1.5 (*)     Calcium 15.5 (*)     Total Protein 6.4      Albumin 2.5 (*)     Total Bilirubin 1.8 (*)     Alkaline Phosphatase 225 (*)     AST 71 (*)     ALT 48 (*)     eGFR 39 (*)     Anion Gap 18 (*)     Narrative:     Calcium critical result(s) called and verbal readback obtained from   Christina Stack NRP by MARY ALICE 02/26/2025 14:29   URINALYSIS, REFLEX TO URINE CULTURE - Abnormal    Specimen UA Urine, Clean Catch      Color, UA Yellow      Appearance, UA Hazy (*)     pH, UA 5.0      Specific Gravity, UA 1.020      Protein, UA 1+ (*)     Glucose, UA Negative      Ketones, UA 1+ (*)     Bilirubin (UA) Negative      Occult Blood UA 2+ (*)     Nitrite, UA Positive (*)     Urobilinogen, UA Negative      Leukocytes, UA 3+ (*)     Narrative:     Specimen Source->Urine   B-TYPE NATRIURETIC PEPTIDE - Abnormal     (*)    TROPONIN I - Abnormal    Troponin I 0.181 (*)    URINALYSIS MICROSCOPIC - Abnormal    RBC, UA 71 (*)     WBC, UA 43 (*)     Bacteria Few (*)     Squam Epithel, UA 3      Hyaline Casts, UA 0      Microscopic Comment SEE COMMENT      Narrative:     Specimen Source->Urine   POCT GLUCOSE - Abnormal    POCT Glucose 172 (*)    CULTURE, BLOOD   CULTURE, BLOOD   CULTURE, URINE   LIPASE    Lipase 10     TROPONIN I   POCT INFLUENZA A/B MOLECULAR    POC Molecular Influenza A Ag Negative      POC Molecular Influenza B Ag Negative       Acceptable Yes     SARS-COV-2 RDRP GENE    POC Rapid COVID Negative       Acceptable Yes            Imaging Results               CT Head Without Contrast (Final result)  Result time 02/26/25 15:37:10      Final result by Alfredo Jaquez III, MD (02/26/25 15:37:10)                   Impression:      No acute process seen.    There are few lucent areas within the posterior skull 1 of which is suspicious for an aggressive process measuring 1.5 cm.  This replaces the type low echo marrow and is demonstrating destruction of the outer and inner table of the skull of the left parieto-occipital junction.  This is worrisome for metastatic disease, or multiple myeloma.  Langerhans cell histiocytosis is thought to be less likely.      Electronically signed by: Alfredo Jaquez MD  Date:    02/26/2025  Time:    15:37               Narrative:    EXAMINATION:  CT HEAD WITHOUT CONTRAST    CLINICAL HISTORY:  Mental status change, unknown cause;    FINDINGS:  No bleed, mass, or mass effect seen.  The brain parenchyma is unremarkable.  No early infarct changes are seen.  There is a lytic area of the left parieto-occipital skull.                                       X-Ray Chest AP Portable (Final result)  Result time 02/26/25 14:07:42      Final result by Alfredo Jaquez III, MD (02/26/25 14:07:42)                   Impression:      Possible right middle lobe pneumonia and a right pleural effusion.      Electronically signed by: Alfredo Jaquez MD  Date:    02/26/2025  Time:    14:07               Narrative:    EXAMINATION:  XR CHEST AP PORTABLE    CLINICAL HISTORY:  Sepsis;    FINDINGS:  Chest one view:    Central line tip lower SVC.  Heart size is normal.  There is right middle lobe infiltrate versus atelectasis and elevation of the right diaphragm.  There is a possible right pleural effusion perhaps a sub pulmonic pleural effusion.  Left lung demonstrates some minimal scar in the left lower lobe.  There is baseline DJD.                                       Medications   NORepinephrine 4 mg in dextrose 5% 250 mL infusion (premix) (0.05 mcg/kg/min ×  51.3 kg Intravenous Rate/Dose Change 2/26/25 1602)   naloxone 0.4 mg/mL injection 1 mg (1 mg Intravenous Not Given 2/26/25 1300)   levothyroxine tablet 150 mcg (has no administration in time range)   sodium chloride 0.9% flush 10 mL (has no administration in time range)   naloxone 0.4 mg/mL injection 0.02 mg (has no administration in time range)   glucose chewable tablet 16 g (has no administration in time range)   glucose chewable tablet 24 g (has no administration in time range)   dextrose 50% injection 12.5 g (has no administration in time range)   dextrose 50% injection 25 g (has no administration in time range)   glucagon (human recombinant) injection 1 mg (has no administration in time range)   acetaminophen suppository 650 mg (has no administration in time range)   morphine injection 2 mg (has no administration in time range)   bisacodyL suppository 10 mg (has no administration in time range)   ondansetron injection 4 mg (has no administration in time range)   prochlorperazine injection Soln 5 mg (has no administration in time range)   albuterol-ipratropium 2.5 mg-0.5 mg/3 mL nebulizer solution 3 mL ( Nebulization Canceled Entry 2/26/25 1630)   ipratropium 0.02 % nebulizer solution 0.5 mg (has no administration in time range)   potassium chloride 10 mEq in 100 mL IVPB (10 mEq Intravenous New Bag 2/26/25 1815)   calcitonin injection 180 Units (180 Units Subcutaneous Given 2/26/25 1853)   0.9% NaCl infusion (has no administration in time range)   mupirocin 2 % ointment (has no administration in time range)   zoledronic acid (ZOMETA) 3 mg in 0.9% NaCl 100 mL IVPB (3 mg Intravenous New Bag 2/26/25 1859)   piperacillin-tazobactam (ZOSYN) 4.5 g in D5W 100 mL IVPB (MB+) (has no administration in time range)   vancomycin - pharmacy to dose (has no administration in time range)   enoxaparin injection 30 mg (has no administration in time range)   lactated ringers bolus 1,539 mL (0 mLs Intravenous Stopped 2/26/25 1451)    piperacillin-tazobactam (ZOSYN) 4.5 g in D5W 100 mL IVPB (MB+) (0 g Intravenous Stopped 2/26/25 1455)   vancomycin 1,250 mg in 0.9% NaCl 250 mL IVPB (admixture device) (0 mg Intravenous Stopped 2/26/25 1733)   naloxone (NARCAN) 1 mg/mL injection (1 mg  Given 2/26/25 1302)   albuterol-ipratropium 2.5 mg-0.5 mg/3 mL nebulizer solution 3 mL (3 mLs Nebulization Given 2/26/25 1557)   sodium chloride 0.9% bolus 1,000 mL 1,000 mL (1,000 mLs Intravenous New Bag 2/26/25 1720)     Medical Decision Making  Patient appears have septic shock secondary to pneumonia with a acute respiratory failure and hypoxia.  Her oxygen requirements are being increased.  She is improving with IV fluids in her blood pressure is improved.  I reassessed her multiple times including at 3:59 p.m. where her mentation was improved and she had better pulses because her blood pressures improved secondary to Levophed.  She will be admitted to the hospitalist whom I spoke with and will take over care.  At this time we will not intubate the patient, but may have to try BiPAP if she requires more oxygen.    Amount and/or Complexity of Data Reviewed  Labs: ordered.  Radiology: ordered.    Risk  Prescription drug management.  Decision regarding hospitalization.      Additional MDM:   Sepsis:   This patient does have evidence of infective focus  My overall impression is septic shock due to SBP < 90.  Source: Respiratory  Antibiotics given- Antibiotics     Patient Encounter Information Not Found      Latest lactate reviewed- yes  Organ dysfunction indicated by Acute respiratory failure    Fluid challenge Actual Body weight- Patient will receive 30ml/kg actual body weight to calculate fluid bolus for treatment of septic shock.     Post- resuscitation assessment Yes - I attest a sepsis perfusion exam was performed within 6 hours of sepsis, severe sepsis, or septic shock presentation, following fluid resuscitation.      Will Start Pressors- Levophed for MAP of  65  Source control achieved by: abx                ED Course as of 02/26/25 1924 Wed Feb 26, 2025   1409 Patient appears have a right lower lobe pneumonia.  Thankfully her blood pressure is significantly improved with norepinephrine drip.  She is still tachycardic.  She is becoming more awake alert and oriented.  I believe her altered mental status earlier was from hypoxia likely secondary to septic shock. [JS]   1411 Central venous line appears to be in place with no signs of pneumothorax. [JS]   1457 Patient has elevated liver function tests which could be from shock liver.  She has dehydration with a acute kidney insufficiency.  She has a high anion gap metabolic acidosis which could be from uremia.  Could also be from her lactic acidosis.  Her calcium is 15.5 which could explain her altered mental status.  She is getting IV fluids IV antibiotics pressors.  Family is at the bedside.  She is still a full code.  My overall impression is septic shock likely from aspiration pneumonia secondary to her procedure yesterday.  Still awaiting a CT head to rule out CVA.  Dysconjugate gaze on exam is the patient's baseline according to the family.  She is able to move all extremities.  She has equal grasp and plantar flexion.  I doubt this is an acute CVA.  Troponin slightly elevated which could be secondary to shock.  I doubt she has a acute coronary syndrome. [JS]   1556 Patient's respiratory status is slightly declining.  She has oxygen saturation of 86-87% with tachypnea.  She is on 15 L nasal cannula.  I spoke to the hospitalist who will evaluate the patient with the family at the bedside.  We may try BiPAP.  We are trying not to intubate her unless it is absolutely necessary given her multiple comorbid terminal illnesses. [JS]      ED Course User Index  [JS] Boy Root MD               Medical Decision Making:   Clinical Tests:   Sepsis Perfusion Assessment: "I attest a sepsis perfusion exam was performed  within 6 hours of sepsis, severe sepsis, or septic shock presentation, following fluid resuscitation."             Clinical Impression:  Final diagnoses:  [Z13.6] Screening for cardiovascular condition  [A41.9, R65.21] Septic shock (Primary)  [J69.0] Aspiration pneumonia of right lower lobe, unspecified aspiration pneumonia type  [J90] Pleural effusion, right  [C79.51, C80.1] Metastasis to bone of unknown primary  [C50.919] Primary malignant neoplasm of breast, unspecified laterality          ED Disposition Condition    Admit                     [1]   Social History  Tobacco Use    Smoking status: Every Day     Current packs/day: 0.90     Average packs/day: 0.9 packs/day for 48.2 years (43.3 ttl pk-yrs)     Types: Cigarettes     Start date: 1977    Tobacco comments:     Pt is a (just under) 1 pk/day cigarette smoker x 48 yrs. Order requested for 21 mg nicotine patch Q day. Handout provided.   Substance Use Topics    Alcohol use: No    Drug use: Not Currently        Boy Root MD  02/26/25 1925

## 2025-02-26 NOTE — ASSESSMENT & PLAN NOTE
Patient with Hypoxic Respiratory failure which is Acute.  she is not on home oxygen. Supplemental oxygen was provided and noted-      .   Signs/symptoms of respiratory failure include- respiratory distress, wheezing, and lethargy. Contributing diagnoses includes - Pleural effusion and Pneumonia Labs and images were reviewed. Patient Has recent ABG, which has been reviewed. Will treat underlying causes and adjust management of respiratory failure as follows    -supplemental O2  -treat PNA with IV antibiotics

## 2025-02-26 NOTE — SUBJECTIVE & OBJECTIVE
Past Medical History:   Diagnosis Date    Hypertension     Thyroid disease     hypothyroid       Past Surgical History:   Procedure Laterality Date     SECTION      x2       Review of patient's allergies indicates:  No Known Allergies    Current Facility-Administered Medications on File Prior to Encounter   Medication    [DISCONTINUED] LIDOcaine (PF) 10 mg/ml (1%) injection 10 mg     Current Outpatient Medications on File Prior to Encounter   Medication Sig    amLODIPine (NORVASC) 10 MG tablet Take 10 mg by mouth once daily.    ascorbic acid, vitamin C, (VITAMIN C) 500 MG tablet Take 500 mg by mouth once daily.    dexAMETHasone (DECADRON) 2 MG tablet Take 1 tablet (2 mg total) by mouth once daily.    gabapentin (NEURONTIN) 300 MG capsule Take 1 capsule (300 mg total) by mouth 3 (three) times daily.    levothyroxine (SYNTHROID) 150 MCG tablet Take 150 mcg by mouth once daily.    lisinopriL-hydrochlorothiazide (PRINZIDE,ZESTORETIC) 20-25 mg Tab Take 1 tablet by mouth once daily.    nicotine (NICODERM CQ) 21 mg/24 hr Place 1 patch onto the skin once daily.    oxyCODONE-acetaminophen (PERCOCET)  mg per tablet Take 1 tablet by mouth every 4 (four) hours as needed for Pain.    propranolol (INDERAL) 10 MG tablet Take 10 mg by mouth 2 (two) times daily.     Family History    None       Tobacco Use    Smoking status: Every Day     Current packs/day: 0.90     Average packs/day: 0.9 packs/day for 48.2 years (43.3 ttl pk-yrs)     Types: Cigarettes     Start date:     Smokeless tobacco: Not on file    Tobacco comments:     Pt is a (just under) 1 pk/day cigarette smoker x 48 yrs. Order requested for 21 mg nicotine patch Q day. Handout provided.   Substance and Sexual Activity    Alcohol use: No    Drug use: Not Currently    Sexual activity: Not Currently     Review of Systems   Reason unable to perform ROS: patient not elif to verbally repsond to quetions.     Objective:     Vital Signs (Most Recent):  Temp:  98.2 °F (36.8 °C) (02/26/25 1230)  Pulse: (!) 112 (02/26/25 1623)  Resp: (!) 28 (02/26/25 1623)  BP: 102/62 (02/26/25 1623)  SpO2: (!) 92 % (02/26/25 1623) Vital Signs (24h Range):  Temp:  [98.2 °F (36.8 °C)] 98.2 °F (36.8 °C)  Pulse:  [] 112  Resp:  [26-31] 28  SpO2:  [86 %-92 %] 92 %  BP: ()/(42-97) 102/62     Weight: 51.3 kg (113 lb)  Body mass index is 19.4 kg/m².     Physical Exam  Vitals reviewed.   Constitutional:       General: She is in acute distress.      Appearance: She is ill-appearing. She is not toxic-appearing.      Comments: Moans but no verbal response   HENT:      Head: Normocephalic and atraumatic.      Nose: Nose normal.      Comments: Upper and lower dentures     Mouth/Throat:      Mouth: Mucous membranes are dry.   Eyes:      Pupils: Pupils are equal, round, and reactive to light.   Cardiovascular:      Rate and Rhythm: Tachycardia present.   Pulmonary:      Effort: Respiratory distress present.      Breath sounds: Wheezing present.   Abdominal:      General: There is no distension.      Tenderness: There is no abdominal tenderness.   Genitourinary:     Vagina: Vaginal discharge present.   Musculoskeletal:         General: No swelling or tenderness.      Cervical back: Normal range of motion.      Right lower leg: No edema.      Left lower leg: No edema.   Skin:     General: Skin is warm.      Capillary Refill: Capillary refill takes 2 to 3 seconds.      Coloration: Skin is pale.      Comments: L-breast necrotic wound with purulent drainage   Neurological:      General: No focal deficit present.      Mental Status: She is disoriented.      Motor: Weakness present.   Psychiatric:         Mood and Affect: Mood normal.         Behavior: Behavior normal.              CRANIAL NERVES     CN III, IV, VI   Pupils are equal, round, and reactive to light.       Significant Labs: All pertinent labs within the past 24 hours have been reviewed.  ABGs:   Recent Labs   Lab 02/26/25  1332   PH  "7.388   PCO2 32.8*   HCO3 19.8*   POCSATURATED 70.1*   PO2 40.5*     BMP:   Recent Labs   Lab 02/26/25  1330   *      K 3.1*      CO2 16*   BUN 61*   CREATININE 1.5*   CALCIUM 15.5*     CBC:   Recent Labs   Lab 02/26/25  1330 02/26/25  1332   WBC 16.47*  --    HGB 12.6  --    HCT 39.4 40.4     --      CMP:   Recent Labs   Lab 02/26/25  1330      K 3.1*      CO2 16*   *   BUN 61*   CREATININE 1.5*   CALCIUM 15.5*   PROT 6.4   ALBUMIN 2.5*   BILITOT 1.8*   ALKPHOS 225*   AST 71*   ALT 48*   ANIONGAP 18*     Lipid Panel: No results for input(s): "CHOL", "HDL", "LDLCALC", "TRIG", "CHOLHDL" in the last 48 hours.  Magnesium: No results for input(s): "MG" in the last 48 hours.  POCT Glucose:   Recent Labs   Lab 02/26/25  1425   POCTGLUCOSE 172*     Respiratory Culture: No results for input(s): "GSRESP", "RESPIRATORYC" in the last 48 hours.  Troponin:   Recent Labs   Lab 02/26/25  1330   TROPONINI 0.181*     TSH:   Recent Labs   Lab 02/04/25  1641   TSH 40.635*     Urine Studies:   Recent Labs   Lab 02/26/25  1556   COLORU Yellow   APPEARANCEUA Hazy*   PHUR 5.0   SPECGRAV 1.020   PROTEINUA 1+*   GLUCUA Negative   KETONESU 1+*   BILIRUBINUA Negative   OCCULTUA 2+*   NITRITE Positive*   UROBILINOGEN Negative   LEUKOCYTESUR 3+*   RBCUA 71*   WBCUA 43*   BACTERIA Few*   SQUAMEPITHEL 3   HYALINECASTS 0       Significant Imaging: I have reviewed all pertinent imaging results/findings within the past 24 hours.  "

## 2025-02-27 PROBLEM — Z51.5 COMFORT MEASURES ONLY STATUS: Status: RESOLVED | Noted: 2025-01-01 | Resolved: 2025-01-01

## 2025-02-27 PROBLEM — Z51.5 COMFORT MEASURES ONLY STATUS: Status: ACTIVE | Noted: 2025-01-01

## 2025-02-27 PROBLEM — G93.41 ENCEPHALOPATHY, METABOLIC: Status: ACTIVE | Noted: 2025-01-01

## 2025-02-27 PROBLEM — R64 CANCER CACHEXIA: Status: ACTIVE | Noted: 2025-01-01

## 2025-02-27 PROBLEM — Z78.9 NURSING HOME RESIDENT: Status: ACTIVE | Noted: 2025-01-01

## 2025-02-27 PROBLEM — C50.812 MALIGNANT NEOPLASM OF OVERLAPPING SITES OF LEFT FEMALE BREAST: Status: ACTIVE | Noted: 2025-01-01

## 2025-02-27 NOTE — SUBJECTIVE & OBJECTIVE
Interval History: patient opening her eyes and moaning but not able to participate in discussion. Spoke with sister at bedside who states she understands that her sister is gravely ill with advanced cancer. States she doesn't want her sister to suffer and for her to be comfortable. Anticipate transition to hospice care at time of discharge, may be sooner if no meaningful improvement following treatment of hypercalcemia/sepsis.    Review of Systems  Objective:     Vital Signs (Most Recent):  Temp: 97.1 °F (36.2 °C) (02/27/25 1215)  Pulse: (!) 115 (02/27/25 1135)  Resp: 20 (02/27/25 1347)  BP: (!) 88/53 (02/27/25 1135)  SpO2: 96 % (02/27/25 1135) Vital Signs (24h Range):  Temp:  [96.8 °F (36 °C)-99.3 °F (37.4 °C)] 97.1 °F (36.2 °C)  Pulse:  [] 115  Resp:  [18-41] 20  SpO2:  [86 %-98 %] 96 %  BP: ()/(53-89) 88/53     Weight: 45 kg (99 lb 3.3 oz)  Body mass index is 17.03 kg/m².    Intake/Output Summary (Last 24 hours) at 2/27/2025 1411  Last data filed at 2/27/2025 0901  Gross per 24 hour   Intake 2454.02 ml   Output 250 ml   Net 2204.02 ml         Physical Exam  Vitals reviewed.   Constitutional:       General: She is in acute distress.      Appearance: She is ill-appearing. She is not toxic-appearing.      Comments: Moans but no verbal response   HENT:      Head: Normocephalic and atraumatic.      Nose: Nose normal.      Comments: Upper and lower dentures     Mouth/Throat:      Mouth: Mucous membranes are dry.   Eyes:      Pupils: Pupils are equal, round, and reactive to light.   Cardiovascular:      Rate and Rhythm: Tachycardia present.   Pulmonary:      Breath sounds: Wheezing present.   Abdominal:      General: There is no distension.      Tenderness: There is no abdominal tenderness.   Musculoskeletal:         General: No swelling or tenderness.      Cervical back: Normal range of motion.      Right lower leg: No edema.      Left lower leg: No edema.   Skin:     General: Skin is warm.      Capillary  Refill: Capillary refill takes 2 to 3 seconds.      Coloration: Skin is pale.      Comments: L-breast necrotic wound with purulent drainage   Neurological:      General: No focal deficit present.      Mental Status: She is alert. She is disoriented.      Motor: Weakness present.   Psychiatric:         Mood and Affect: Mood normal.         Behavior: Behavior normal.             Significant Labs: All pertinent labs within the past 24 hours have been reviewed.    Significant Imaging: I have reviewed all pertinent imaging results/findings within the past 24 hours.

## 2025-02-27 NOTE — ASSESSMENT & PLAN NOTE
- likely related to her metastatic cancer  - can be contributing to her encephalopathy  - continue IV fluids  - consider zoledronic acid

## 2025-02-27 NOTE — ASSESSMENT & PLAN NOTE
- CT scan (2/5/25) reveals metastatic disease with large left breast mass. This clinical picture likely represents metastatic breast cancer  - s/p bone biopsy on 2/25/25. Follow up results from biopsy  - admitted for acute respiratory failure, severe sepsis, hypercalcemia  - continue IV fluids. Consider zoledronic acid for hypercalcemia of malignancy  - no family members were at bedside at time of my examination. I don't have a clear sense of her baseline performance status, but it appears poor (ECOG 2-3)  - agree with palliative care consult  - if biopsy reveals hormone-positive breast cancer, it is reasonable to start endocrine therapy (letrozole or anastrozole), even if patient will be transitioning back to hospice.

## 2025-02-27 NOTE — CONSULTS
[facility] - Intensive Care  Pulmonology  Consult Note    Patient Name: Nora Torres  MRN: [id number]  Admission Date: 2025  Hospital Length of Stay: 0 days  Code Status: DNR  Attending Physician: Paddy Garzon,*  Primary Care Provider: Hernandez Booker MD   Principal Problem: Acute respiratory failure with hypoxia    Consults  Subjective:     HPI:  64 year old woman with history of metastatic breast cancer including to the bone and brain, hypertension, and hypothyroidism that presents to the ED from [facility] for evaluation of altered mental status for 1 day and decreased urine output.    On presentation, patient was afebrile but tachycardia, tachypnea and hypotension. Patient required 4L to maintain oxygen sat. Labs remarkable for leucocytosis, hypokalemia, azotemia, anion gap metabolic acidosis and severe hypercalcemia. UA positive for infection. CT head showed lytic brain lesions. CXR showed right middle lobe infiltrates vs atelectasis. Patient received sepsis bolus LR IVF, along with empiric antibiotics with zosyn, azithromycin and vancomycin. Patient was started on levophed and central line was placed and admitted to ICU for further management. Good cardiac contraction on POCUS and no pleural effusion appreciated.  Of note, patient had recent bone biopsy, result is pending.    Past Medical History:   Diagnosis Date    Hypertension     Thyroid disease     hypothyroid       Past Surgical History:   Procedure Laterality Date     SECTION      x2       Review of patient's allergies indicates:  No Known Allergies    Family History    None       Tobacco Use    Smoking status: Every Day     Current packs/day: 0.90     Average packs/day: 0.9 packs/day for 48.2 years (43.3 ttl pk-yrs)     Types: Cigarettes     Start date:     Smokeless tobacco: Not on file    Tobacco comments:     Pt is a (just under) 1 pk/day cigarette smoker x 48 yrs. Order requested for 21 mg nicotine patch Q day.  Handout provided.   Substance and Sexual Activity    Alcohol use: No    Drug use: Not Currently    Sexual activity: Not Currently         Review of Systems   Reason unable to perform ROS: Limited due to AMS.     Objective:     Vital Signs (Most Recent):  Temp: 96.8 °F (36 °C) (02/26/25 1700)  Pulse: 108 (02/26/25 1749)  Resp: (!) 25 (02/26/25 1749)  BP: 131/76 (02/26/25 1749)  SpO2: (!) 94 % (02/26/25 1749) Vital Signs (24h Range):  Temp:  [96.8 °F (36 °C)-98.2 °F (36.8 °C)] 96.8 °F (36 °C)  Pulse:  [] 108  Resp:  [25-31] 25  SpO2:  [86 %-97 %] 94 %  BP: ()/(42-97) 131/76     Weight: 45 kg (99 lb 3.3 oz)  Body mass index is 17.03 kg/m².    No intake or output data in the 24 hours ending 02/26/25 1806     Physical Exam  Vitals reviewed.   Constitutional:       General: She is not in acute distress.     Appearance: She is ill-appearing.   HENT:      Head: Normocephalic and atraumatic.      Mouth/Throat:      Mouth: Mucous membranes are dry.   Eyes:      Conjunctiva/sclera: Conjunctivae normal.   Cardiovascular:      Rate and Rhythm: Regular rhythm. Tachycardia present.      Heart sounds: No murmur heard.  Pulmonary:      Effort: Pulmonary effort is normal. No respiratory distress.      Breath sounds: No wheezing or rhonchi.   Abdominal:      General: There is no distension.      Palpations: Abdomen is soft.   Musculoskeletal:      Right lower leg: No edema.      Left lower leg: No edema.   Skin:     Comments: Left breast cancer with necrotic and fungating wounds      Neurological:      Mental Status: She is disoriented.          Vents:       Lines/Drains/Airways       Central Venous Catheter Line  Duration             Percutaneous Central Line - Triple Lumen  02/26/25 Internal Jugular Right <1 day              Peripheral Intravenous Line  Duration                  Peripheral IV - Single Lumen 02/26/25 1259 16 G Anterior;Right Other <1 day         Peripheral IV - Single Lumen 02/26/25 1300 20 G  Anterior;Right Forearm <1 day                    Significant Labs:    CBC/Anemia Profile:  Recent Labs   Lab 02/26/25  1330 02/26/25  1332   WBC 16.47*  --    HGB 12.6  --    HCT 39.4 40.4     --    MCV 87  --    RDW 17.9*  --         Chemistries:  Recent Labs   Lab 02/26/25  1330      K 3.1*      CO2 16*   BUN 61*   CREATININE 1.5*   CALCIUM 15.5*   ALBUMIN 2.5*   PROT 6.4   BILITOT 1.8*   ALKPHOS 225*   ALT 48*   AST 71*       All pertinent labs within the past 24 hours have been reviewed.    Significant Imaging:   I have reviewed all pertinent imaging results/findings within the past 24 hours.  Assessment/Plan:   Acute encephalopathy  Due to severe hypercalcemia and possible sepsis from UTI/Pneumonia   Continue management of hypercalcemia and sepsis  Delirium precautions  Monitor mentation    Shock  Likely due to hypovolemia and sepsis  Sepsis due to UTI and possible pneumonia  Follow cultures  Continue IVF resuscitation  Continue empiric antibiotics with ceftriaxone and azithromycin  Continue Levophed to keep MAP > 65  Continuous cardiac monitoring  Monitor hemodynamics    Acute hypoxemia respiratory failure  Due to above  Continue oxygen supplementation  Wean FiO2 as tolerated    Severe hypercalcemia  Likely due to metastatic breast cancer to the bone  Check PTH, PTHrP, TSH, and phosphorus  Continue IVF with normal saline  Start Calcitonin injection  Give a dose of zoledronic acid  Monitor calcium and phosphorus level  Strict I and O charting    NANCY  Likely due to hypercalcemia and intravascular depletion  Management as above  Avoid nephrotoxic meds    Hypokalemia   Check magnesium  Replete potassium      .Feeding/fluids: Normal saline  Analgesia: Morphine PRN  Sedation: N/A  Thromboprophylaxis: Enoxaparin  Head up position: Yes  Ulcer prophylaxis: N/A  Glycemic control: Yes  Spontaneous breathing trial: N/A  Bowel care: Yes  Indwelling catheter removal: Right IJV, 2 peripheral  line  Deescalation of antibiotics: N/A    Code:   Extension discussion with the family member including her sister, patient currently have severe hypercalcemia due to her underlying breast cancer that has spread to her bone. Given her terminal cancer, family elected for no chest compression and no intubation. They are okay with continuation of current treatment and treating of her hypercalcemia and infection with medications. Code status updated in EPIC.  Will also put in palliative care consultation.       Smita Singh MD  Pulmonology  [place] - Intensive Care

## 2025-02-27 NOTE — CONSULTS
[facility] - Intensive Care  Hematology/Oncology  Consult Note    Patient Name: Nora Torres  MRN: [id number]  Admission Date: 2/26/2025  Hospital Length of Stay: 1 days  Code Status: DNR   Attending Provider: Paddy Garzon,*  Consulting Provider: Peng Jasso MD  Primary Care Physician: Hernandez Booker MD  Principal Problem:Acute respiratory failure with hypoxia    Inpatient consult to Hematology/Oncology  Consult performed by: Peng Jasso MD  Consult ordered by: Nia Means DNP  Reason for consult: breast cancer        Subjective:     HPI:  64 year-old female with likely metastatic breast cancer (biopsy results are pending) was admitted on 2/26/25 for acute-on-chronic respiratory failure. Consult is for breast cancer.    [Unable to assess review of systems due to acuity of condition, patient not responding to verbal stimuli.]      Oncology Treatment Plan:   [No matching plan found]    Medications:  Continuous Infusions:   0.9% NaCl   Intravenous Continuous 150 mL/hr at 02/27/25 0500 New Bag at 02/27/25 0500    NORepinephrine bitartrate-D5W  0-3 mcg/kg/min Intravenous Continuous 9.6 mL/hr at 02/26/25 1602 0.05 mcg/kg/min at 02/26/25 1602     Scheduled Meds:   albuterol-ipratropium  3 mL Nebulization Q4H WAKE    calcitonin  4 Units/kg Subcutaneous Q12H    dexAMETHasone  2 mg Oral Daily    enoxparin  30 mg Subcutaneous Q24H (prophylaxis, 1700)    levothyroxine  150 mcg Oral Daily    mupirocin   Nasal BID    piperacillin-tazobactam (Zosyn) IV (PEDS and ADULTS) (extended infusion is not appropriate)  4.5 g Intravenous Q8H     PRN Meds:  Current Facility-Administered Medications:     acetaminophen, 650 mg, Rectal, Q4H PRN    bisacodyL, 10 mg, Rectal, Daily PRN    dextrose 50%, 12.5 g, Intravenous, PRN    dextrose 50%, 25 g, Intravenous, PRN    glucagon (human recombinant), 1 mg, Intramuscular, PRN    glucose, 16 g, Oral, PRN    glucose, 24 g, Oral, PRN    ipratropium, 0.5 mg, Nebulization, Q6H PRN     morphine, 2 mg, Intravenous, Q4H PRN    naloxone, 0.02 mg, Intravenous, PRN    ondansetron, 4 mg, Intravenous, Q8H PRN    prochlorperazine, 5 mg, Intravenous, Q6H PRN    sodium chloride 0.9%, 10 mL, Intravenous, Q12H PRN    Pharmacy to dose Vancomycin consult, , , Once **AND** vancomycin - pharmacy to dose, , Intravenous, pharmacy to manage frequency     Review of patient's allergies indicates:  No Known Allergies     Past Medical History:   Diagnosis Date    Hypertension     Thyroid disease     hypothyroid     Past Surgical History:   Procedure Laterality Date     SECTION      x2     Family History    None       Tobacco Use    Smoking status: Every Day     Current packs/day: 0.90     Average packs/day: 0.9 packs/day for 48.2 years (43.3 ttl pk-yrs)     Types: Cigarettes     Start date:     Smokeless tobacco: Not on file    Tobacco comments:     Pt is a (just under) 1 pk/day cigarette smoker x 48 yrs. Order requested for 21 mg nicotine patch Q day. Handout provided.   Substance and Sexual Activity    Alcohol use: No    Drug use: Not Currently    Sexual activity: Not Currently       Review of Systems   Unable to perform ROS: Acuity of condition     Objective:     Vital Signs (Most Recent):  Temp: 99.3 °F (37.4 °C) (25 0745)  Pulse: 106 (25 0615)  Resp: 18 (25)  BP: 119/70 (25)  SpO2: 98 % (25) Vital Signs (24h Range):  Temp:  [96.8 °F (36 °C)-99.3 °F (37.4 °C)] 99.3 °F (37.4 °C)  Pulse:  [] 106  Resp:  [18-38] 18  SpO2:  [86 %-98 %] 98 %  BP: ()/(42-97) 119/70     Weight: 45 kg (99 lb 3.3 oz)  Body mass index is 17.03 kg/m².  Body surface area is 1.43 meters squared.      Intake/Output Summary (Last 24 hours) at 2025 0751  Last data filed at 2025 0625  Gross per 24 hour   Intake 2454.02 ml   Output --   Net 2454.02 ml        Physical Exam  Vitals and nursing note reviewed.   Constitutional:       Appearance: She is well-developed.       Comments: Fatigue, malnourished   HENT:      Head: Normocephalic and atraumatic.   Eyes:      Pupils: Pupils are equal, round, and reactive to light.   Cardiovascular:      Rate and Rhythm: Regular rhythm. Tachycardia present.   Pulmonary:      Effort: Pulmonary effort is normal.      Breath sounds: Rhonchi present.      Comments: Supplemental oxygen noted  Abdominal:      General: Bowel sounds are normal.      Palpations: Abdomen is soft.   Musculoskeletal:         General: Normal range of motion.      Cervical back: Normal range of motion and neck supple.   Skin:     General: Skin is warm and dry.      Findings: Lesion (left breast mass with drainage) present.   Neurological:      Mental Status: She is alert.      Comments: Unable to assess   Psychiatric:      Comments: Unable to assess          Significant Labs:   CBC:   Recent Labs   Lab 02/26/25  1330 02/26/25  1332 02/27/25  0413   WBC 16.47*  --  13.96*   HGB 12.6  --  12.5   HCT 39.4 40.4 38.6     --  323    and CMP:   Recent Labs   Lab 02/26/25  1330 02/27/25  0413    146*   K 3.1* 3.5    115*   CO2 16* 17*   * 118*   BUN 61* 50*   CREATININE 1.5* 1.2   CALCIUM 15.5* 13.2*   PROT 6.4  --    ALBUMIN 2.5*  --    BILITOT 1.8*  --    ALKPHOS 225*  --    AST 71*  --    ALT 48*  --    ANIONGAP 18* 14       Diagnostic Results:  CT chest/abdomen/pelvis (2/5/25): I have personally reviewed the images  Mass in the left breast concerning for primary malignancy.     Diffuse osseous metastatic disease.     2.5 cm indeterminate left adrenal nodule, possibly metastatic disease.     Indeterminate right adnexal calcified lesion.  Differential considerations include an ovarian mass versus metastatic disease.     1.5 cm enhancing focus which becomes isodense on delayed phase, indeterminate and possibly representing variant perfusion although metastatic disease is not entirely excluded but felt less likely.     1.0 cm nodule in the right upper lobe.      Moderate bilateral pleural effusions.    Assessment/Plan:     Hypercalcemia of malignancy  - likely related to her metastatic cancer  - can be contributing to her encephalopathy  - continue IV fluids  - consider zoledronic acid    Malignant neoplasm of overlapping sites of left female breast  - CT scan (2/5/25) reveals metastatic disease with large left breast mass. This clinical picture likely represents metastatic breast cancer  - s/p bone biopsy on 2/25/25. Follow up results from biopsy  - admitted for acute respiratory failure, severe sepsis, hypercalcemia  - continue IV fluids. Consider zoledronic acid for hypercalcemia of malignancy  - no family members were at bedside at time of my examination. I don't have a clear sense of her baseline performance status, but it appears poor (ECOG 2-3)  - agree with palliative care consult  - if biopsy reveals hormone-positive breast cancer, it is reasonable to start endocrine therapy (letrozole or anastrozole), even if patient will be transitioning back to hospice.        Thank you for your consult.     Peng Jasso MD  Hematology/Oncology  [facility] - Intensive Care

## 2025-02-27 NOTE — PLAN OF CARE
Received pt on HFNC @ 10L; no distress noted. Pt agitated with tx; will cont to monitor   Problem: Adult Inpatient Plan of Care  Goal: Plan of Care Review  Outcome: Progressing

## 2025-02-27 NOTE — PROGRESS NOTES
[facility] Medicine  Progress Note    Patient Name: Nora Torres  MRN: [id number]  Patient Class: IP- Inpatient   Admission Date: 2/26/2025  Length of Stay: 1 days  Attending Physician: Paddy Garzon,*  Primary Care Provider: Hernandez Booker MD        Subjective     Principal Problem:Acute respiratory failure with hypoxia        HPI:  Nora Torres a 64-year old female with PMHx of HTN, hypothyroidism, L-breast CA, and lytic mets came into the ER from NH with AMS.Yesterday, the patient had a CT guided L-illiac bone lesion biopsy with IR then was discharged back to [facility]. Today patient returned with AMS and starring gaze according to ER doctor and family reports no urinary output since yesterday. During my exam, patient eyes upon to verbal stimuli, but no comprehensible response. Patient doesn't follow commands, unable to voice if she is in pain, but is moaning while at the beside. Patient ROS + hypoxia requiring 15L non-rebreather, course lungs with crackles L>R, L-breast necrotic with open wound with purulent drainage (see media), and AMS. Patient was recently discharge on 2/14/25 for back back pain with compression fx which lead to outpatient biopsy, then opted to discharge to NH.     ED course:   Labs:   WBC 16.47, K 3.1, CO2 16, Anion Gap 18, BUN 61, Cr 1.5, eGFR 39, Glucose 175, Ca 15.5, , AST/ALT 71/48, , Troponin 0.181.  Diagnostics:  CT of head without contrast: No acute process seen. There are few lucent areas within the posterior skull 1 of which is suspicious for an aggressive process measuring 1.5 cm.  This replaces the type low echo marrow and is demonstrating destruction of the outer and inner table of the skull of the left parieto-occipital junction.  This is worrisome for metastatic disease, or multiple myeloma.  Langerhans cell histiocytosis is thought to be less likely.   Chest xray: Possible right middle lobe pneumonia and a right pleural  effusion.   Medication: albuterol-iprotopium, LR 1539cc, Narcan 1mg, zosyn 4.5g, vancomycin 1250mg, Norepinephrine  Vitals: 98.2F, /42-97, (), , 27-49, 86-92%       Overview/Hospital Course:  No notes on file    Interval History: patient opening her eyes and moaning but not able to participate in discussion. Spoke with sister at bedside who states she understands that her sister is gravely ill with advanced cancer. States she doesn't want her sister to suffer and for her to be comfortable. Anticipate transition to hospice care at time of discharge, may be sooner if no meaningful improvement following treatment of hypercalcemia/sepsis.    Review of Systems  Objective:     Vital Signs (Most Recent):  Temp: 97.1 °F (36.2 °C) (02/27/25 1215)  Pulse: (!) 115 (02/27/25 1135)  Resp: 20 (02/27/25 1347)  BP: (!) 88/53 (02/27/25 1135)  SpO2: 96 % (02/27/25 1135) Vital Signs (24h Range):  Temp:  [96.8 °F (36 °C)-99.3 °F (37.4 °C)] 97.1 °F (36.2 °C)  Pulse:  [] 115  Resp:  [18-41] 20  SpO2:  [86 %-98 %] 96 %  BP: ()/(53-89) 88/53     Weight: 45 kg (99 lb 3.3 oz)  Body mass index is 17.03 kg/m².    Intake/Output Summary (Last 24 hours) at 2/27/2025 1411  Last data filed at 2/27/2025 0901  Gross per 24 hour   Intake 2454.02 ml   Output 250 ml   Net 2204.02 ml         Physical Exam  Vitals reviewed.   Constitutional:       General: She is in acute distress.      Appearance: She is ill-appearing. She is not toxic-appearing.      Comments: Moans but no verbal response   HENT:      Head: Normocephalic and atraumatic.      Nose: Nose normal.      Comments: Upper and lower dentures     Mouth/Throat:      Mouth: Mucous membranes are dry.   Eyes:      Pupils: Pupils are equal, round, and reactive to light.   Cardiovascular:      Rate and Rhythm: Tachycardia present.   Pulmonary:      Breath sounds: Wheezing present.   Abdominal:      General: There is no distension.      Tenderness: There is no abdominal  tenderness.   Musculoskeletal:         General: No swelling or tenderness.      Cervical back: Normal range of motion.      Right lower leg: No edema.      Left lower leg: No edema.   Skin:     General: Skin is warm.      Capillary Refill: Capillary refill takes 2 to 3 seconds.      Coloration: Skin is pale.      Comments: L-breast necrotic wound with purulent drainage   Neurological:      General: No focal deficit present.      Mental Status: She is alert. She is disoriented.      Motor: Weakness present.   Psychiatric:         Mood and Affect: Mood normal.         Behavior: Behavior normal.             Significant Labs: All pertinent labs within the past 24 hours have been reviewed.    Significant Imaging: I have reviewed all pertinent imaging results/findings within the past 24 hours.    Assessment and Plan     * Acute respiratory failure with hypoxia  Patient with Hypoxic Respiratory failure which is Acute.  she is not on home oxygen. Supplemental oxygen was provided and noted-      .   Signs/symptoms of respiratory failure include- respiratory distress, wheezing, and lethargy. Contributing diagnoses includes - Pleural effusion and Pneumonia Labs and images were reviewed. Patient Has recent ABG, which has been reviewed. Will treat underlying causes and adjust management of respiratory failure as follows    -supplemental O2  -treat PNA with IV antibiotics    Cancer cachexia  Concern for cachexia as evidenced by BMI less than 20 in the presence of known chronic disease, loss of muscle mass, and albumen level less than 3.2. Contributing factors include underlying Malignancy. Treatment to include management of underlying chronic disease and palliative consultation.      Body mass index is 17.03 kg/m².  Albumin   Date Value Ref Range Status   02/26/2025 2.5 (L) 3.5 - 5.2 g/dL Final         Nursing home resident  - bedbound at baseline but with fluent speech      Encephalopathy, metabolic  - due to hypercalcemia and  sepsis vs cancer  - treating with IVF and antibiotics  - hopefully, will have improvement in mentation enough to participate more fully in [facility] discussion with treatment of hypercalcemia/infection, although unclear if this will be the case      Open wound of left breast     Left breast wound necrotic, open with purulent drainage  -heme/onc consulted  -pain management  -wound care consulted      Pleural effusion  Patient found to have moderate pleural effusion on imaging. I have not personally reviewed and interpreted the following imaging: Xray. A thoracentesis was deferred. Most likely etiology includes Pneumonia. Management to include  treatment of infection      NANCY (acute kidney injury)  NANCY is likely due to pre-renal azotemia due to intravascular volume depletion. Baseline creatinine is  0.6-0.8 . Most recent creatinine and eGFR are listed below.  Recent Labs     02/26/25  1330   CREATININE 1.5*   EGFRNORACEVR 39*      Plan  - NANCY is  acute onset  - Avoid nephrotoxins and renally dose meds for GFR listed above  - Monitor urine output, serial BMP, and adjust therapy as needed  -IV Fluids NS @ 100cc  -due to hypercalcemia/shock    Cystitis  Patient has AMS, WBC 16.47, UA positive for leukocytes, nitrates, WBC  -covered with zosyn      Septic shock  This patient has shock. The type of shock is  septic . The patient has the following evidence of shock: persistent hypotension, NANCY, altered mental status, and hypoxia. The patient will be admitted to an intensive care unit, they will be treated with IV fluids, ABX, and pressors.    CAP (community acquired pneumonia)  Patient has a diagnosis of pneumonia. The cause of the pneumonia is suspected to be bacterial in etiology but organism is not known. The pneumonia is  acute onset . The patient has the following signs/symptoms of pneumonia: persistent hypoxia  and shortness of breath. The patient does have a current oxygen requirement and the patient does not have a home  oxygen requirement. I have reviewed the pertinent imaging. The following cultures have been collected: Blood cultures The culture results are listed below.     Current antimicrobial regimen consists of the antibiotics listed below. Will monitor patient closely and Adjust treatment plan as follows will de-escalate ABX based on pending cultures    Antibiotics (From admission, onward)      Start     Stop Route Frequency Ordered    02/26/25 1800  cefTRIAXone injection 1 g         -- IV Every 24 hours (non-standard times) 02/26/25 1647    02/26/25 1800  azithromycin (ZITHROMAX) 500 mg in 0.9% NaCl 250 mL IVPB (admixture device)         03/03/25 1759 IV Every 24 hours (non-standard times) 02/26/25 1657    02/26/25 1300  vancomycin 1,250 mg in 0.9% NaCl 250 mL IVPB (admixture device)  (Sepsis Workup)         -- IV Once 02/26/25 1259            Microbiology Results (last 7 days)       Procedure Component Value Units Date/Time    Urine culture [[id number]] Collected: 02/26/25 1556    Order Status: No result Specimen: Urine Updated: 02/26/25 1620    Influenza A & B by Molecular [[id number]] Collected: 02/26/25 1259    Order Status: Sent Specimen: Nasopharyngeal Swab Updated: 02/26/25 1450    Blood culture x two cultures. Draw prior to antibiotics. [[id number]] Collected: 02/26/25 1348    Order Status: Sent Specimen: Blood from Line, Jugular, External Right     Blood culture x two cultures. Draw prior to antibiotics. [[id number]] Collected: 02/26/25 1337    Order Status: Sent Specimen: Blood from Line, Jugular, External Right             Hypercalcemia of malignancy  Hypercalcemia is likely due to Malignancy. The patient has the following symptoms due to their hypercalcemia: weakness and encephalopathy. Their most recent calcium and albumin results are listed below.  Recent Labs     02/26/25  1330   CALCIUM 15.5*   ALBUMIN 2.5*     Plan  - Obtain the following labs to work up the cause of hypercalcemia: alkaline phosphatase    Alkaline Phosphatase   Date Value Ref Range Status   02/26/2025 225 (H) 40 - 150 U/L Final    .   - Treat the hypercalcemia with: IV fluids ordered at a rate of 100 ml/hr, Bisphosphonates, and Calcitonin.   - The patient's hypercalcemia is improving but not resolved.       Malignant neoplasm of overlapping sites of left female breast  Patient has metastatic cancer of unknown primary. The cancer has metastasized to bones. The patient is not under the care of an outpatient oncologist. The patient is not undergoing active chemotherapy. .Their staging information is listed below.    Cancer Staging   No matching staging information was found for the patient.    -Heme/Onc consulted  -f/u on biopsy results        VTE Risk Mitigation (From admission, onward)           Ordered     enoxaparin injection 30 mg  Every 24 hours         02/26/25 1812     IP VTE HIGH RISK PATIENT  Once         02/26/25 1616     Place sequential compression device  Until discontinued         02/26/25 1616                    Discharge Planning   DRU:      Code Status: DNR   Medical Readiness for Discharge Date:   Discharge Plan A: Return to nursing home            Critical care time spent on the evaluation and treatment of severe organ dysfunction, review of pertinent labs and imaging studies, discussions with consulting providers and discussions with patient/family: 35 minutes.            Paddy Garzon MD  Department of Hospital Medicine   [place] - Intensive Care

## 2025-02-27 NOTE — SUBJECTIVE & OBJECTIVE
[Unable to assess review of systems due to acuity of condition, patient not responding to verbal stimuli.]      Oncology Treatment Plan:   [No matching plan found]    Medications:  Continuous Infusions:   0.9% NaCl   Intravenous Continuous 150 mL/hr at 25 0500 New Bag at 25 0500    NORepinephrine bitartrate-D5W  0-3 mcg/kg/min Intravenous Continuous 9.6 mL/hr at 25 1602 0.05 mcg/kg/min at 25 1602     Scheduled Meds:   albuterol-ipratropium  3 mL Nebulization Q4H WAKE    calcitonin  4 Units/kg Subcutaneous Q12H    dexAMETHasone  2 mg Oral Daily    enoxparin  30 mg Subcutaneous Q24H (prophylaxis, 0)    levothyroxine  150 mcg Oral Daily    mupirocin   Nasal BID    piperacillin-tazobactam (Zosyn) IV (PEDS and ADULTS) (extended infusion is not appropriate)  4.5 g Intravenous Q8H     PRN Meds:  Current Facility-Administered Medications:     acetaminophen, 650 mg, Rectal, Q4H PRN    bisacodyL, 10 mg, Rectal, Daily PRN    dextrose 50%, 12.5 g, Intravenous, PRN    dextrose 50%, 25 g, Intravenous, PRN    glucagon (human recombinant), 1 mg, Intramuscular, PRN    glucose, 16 g, Oral, PRN    glucose, 24 g, Oral, PRN    ipratropium, 0.5 mg, Nebulization, Q6H PRN    morphine, 2 mg, Intravenous, Q4H PRN    naloxone, 0.02 mg, Intravenous, PRN    ondansetron, 4 mg, Intravenous, Q8H PRN    prochlorperazine, 5 mg, Intravenous, Q6H PRN    sodium chloride 0.9%, 10 mL, Intravenous, Q12H PRN    Pharmacy to dose Vancomycin consult, , , Once **AND** vancomycin - pharmacy to dose, , Intravenous, pharmacy to manage frequency     Review of patient's allergies indicates:  No Known Allergies     Past Medical History:   Diagnosis Date    Hypertension     Thyroid disease     hypothyroid     Past Surgical History:   Procedure Laterality Date     SECTION      x2     Family History    None       Tobacco Use    Smoking status: Every Day     Current packs/day: 0.90     Average packs/day: 0.9 packs/day for 48.2 years  (43.3 ttl pk-yrs)     Types: Cigarettes     Start date: 1977    Smokeless tobacco: Not on file    Tobacco comments:     Pt is a (just under) 1 pk/day cigarette smoker x 48 yrs. Order requested for 21 mg nicotine patch Q day. Handout provided.   Substance and Sexual Activity    Alcohol use: No    Drug use: Not Currently    Sexual activity: Not Currently       Review of Systems   Unable to perform ROS: Acuity of condition     Objective:     Vital Signs (Most Recent):  Temp: 99.3 °F (37.4 °C) (02/27/25 0745)  Pulse: 106 (02/27/25 0615)  Resp: 18 (02/27/25 0615)  BP: 119/70 (02/27/25 0615)  SpO2: 98 % (02/27/25 0615) Vital Signs (24h Range):  Temp:  [96.8 °F (36 °C)-99.3 °F (37.4 °C)] 99.3 °F (37.4 °C)  Pulse:  [] 106  Resp:  [18-38] 18  SpO2:  [86 %-98 %] 98 %  BP: ()/(42-97) 119/70     Weight: 45 kg (99 lb 3.3 oz)  Body mass index is 17.03 kg/m².  Body surface area is 1.43 meters squared.      Intake/Output Summary (Last 24 hours) at 2/27/2025 0751  Last data filed at 2/27/2025 0625  Gross per 24 hour   Intake 2454.02 ml   Output --   Net 2454.02 ml        Physical Exam  Vitals and nursing note reviewed.   Constitutional:       Appearance: She is well-developed.      Comments: Fatigue, malnourished   HENT:      Head: Normocephalic and atraumatic.   Eyes:      Pupils: Pupils are equal, round, and reactive to light.   Cardiovascular:      Rate and Rhythm: Regular rhythm. Tachycardia present.   Pulmonary:      Effort: Pulmonary effort is normal.      Breath sounds: Rhonchi present.      Comments: Supplemental oxygen noted  Abdominal:      General: Bowel sounds are normal.      Palpations: Abdomen is soft.   Musculoskeletal:         General: Normal range of motion.      Cervical back: Normal range of motion and neck supple.   Skin:     General: Skin is warm and dry.      Findings: Lesion (left breast mass with drainage) present.   Neurological:      Mental Status: She is alert.      Comments: Unable to assess    Psychiatric:      Comments: Unable to assess          Significant Labs:   CBC:   Recent Labs   Lab 02/26/25  1330 02/26/25  1332 02/27/25  0413   WBC 16.47*  --  13.96*   HGB 12.6  --  12.5   HCT 39.4 40.4 38.6     --  323    and CMP:   Recent Labs   Lab 02/26/25  1330 02/27/25  0413    146*   K 3.1* 3.5    115*   CO2 16* 17*   * 118*   BUN 61* 50*   CREATININE 1.5* 1.2   CALCIUM 15.5* 13.2*   PROT 6.4  --    ALBUMIN 2.5*  --    BILITOT 1.8*  --    ALKPHOS 225*  --    AST 71*  --    ALT 48*  --    ANIONGAP 18* 14       Diagnostic Results:  CT chest/abdomen/pelvis (2/5/25): I have personally reviewed the images  Mass in the left breast concerning for primary malignancy.     Diffuse osseous metastatic disease.     2.5 cm indeterminate left adrenal nodule, possibly metastatic disease.     Indeterminate right adnexal calcified lesion.  Differential considerations include an ovarian mass versus metastatic disease.     1.5 cm enhancing focus which becomes isodense on delayed phase, indeterminate and possibly representing variant perfusion although metastatic disease is not entirely excluded but felt less likely.     1.0 cm nodule in the right upper lobe.     Moderate bilateral pleural effusions.

## 2025-02-27 NOTE — CONSULTS
Palliative Medicine  Consult Note     Patient Name: Nora Torres   MRN: [id number]   Admission Date: 2/26/2025   Hospital Length of Stay: 1   Attending Provider: Paddy Garzon,*   Consulting Provider: Clive Rivera Jr, MD  Primary Care Physician: Hernandez Booker MD   Principal Problem: Acute respiratory failure with hypoxia     Patient information was obtained from relative(s), past medical records, and primary team.        Inpatient consult to Palliative Care  Consult performed by: Clive Rivera Jr., MD  Consult ordered by: Smita Singh MD             Assessment/Plan:      Palliative care consulted for transitions in  care.     Palliative Care Encounter:  Impression:    64F with stage IV malignancy of presumed left breast primary, presented in 10/2024 with a fungating left breast mass and clinically stage IV breast CA (pathologic L5 fx POA) and was rec'd for urgent onc followup but pt is in denial, amotivational, possibly with an underlying benign personality disorder or mild cognitive impairment. Now ECOG 4 and living with relatives who only very recently learned of her clinical dx of end stage breast cancer.    Pt has had declining PO intake over the past week and underwent CT left iliac bone biopsy on 2/25/25, readmitted within 24h for lethargy and respiratory distress secondary to post- or intra-procedural aspiration pneumonitis I/s/o profound malignant hypercalcemia. Admitted to ICU for high flow oxygen now escalated to NIV. On steroids, pressors, broad abx, and aggressive anti-hyperCa tx with calcitonin and furosemide.    Pt remains in a tumultuous long-term decline and now appears terminally decompensated due to complications of her anesthesia during bone biopsy. No realistic prospect of cancer directed treatments past this point barring an immediate and miraculous response to our current measures.     If pt remains on this bleak clinical course I anticipate her loved ones will agree to  fully comfort focused care within the next few days. No urgency for today as family accepts the need for comfort measures and multiple relatives are actively en route.    Advance Care Planning   Advance Directives:   Living Will: No        Oral Declaration: No    LaPOST: No    Do Not Resuscitate Status: Yes    Agent's Name:  Herlinda Torres (sister)   Agent's Contact Number:  [phone]    Decision Making:  Patient answered questions  Goals of Care: What is most important right now is to focus on avoiding the hospital, remaining as independent as possible, symptom/pain control, improvement in condition but with limits to invasive therapies. Accordingly, we have decided that the best plan to meet the patient's goals includes continuing with treatment.       - Prior experience with serious illness: yes  -The patient has previously engaged in advance care planning or GOC discussions  - Insight/Understanding of illness: age appropriate understanding, unfortunately care has been hindered by baseline personality traits including magical thinking her solitary lifestyle, with most relatives only now learning that pt is terminally ill    Life Limiting Diagnosis:  Clinical working dx of stage IV primary breast adenoCA, awaiting final path interpretation of recent bone biopsy (in process)    -Prognosis-Time and potential for recovery:   1-7 days on current course, do not anticipate any cancer directed treatments to result from this biopsy as pt is ECOG 4 at baseline and now critically ill    -Functional status: poor.  Pt was not able to manage ADLs  -Dementia diagnosis no      Symptom Management:  -Pain: yes, per nonverbal indicators      -Dyspnea yes, labored and tachypneic despite NIV      -Anxiety/Depression yes, visibly anxious      -Constipation: no      -Anorexia:no      Summary of recommendations and follow up plan:  -Most important goals at this time: escalated symptom control with ongoing supportive measures and  appropriate limitations to invasive interventions at end of life   -Code status: DNR/DNI  -Disposition: remain in ICU    The above recommendations communicated directly to primary team on 2/27/25.    Thank you for your consult. I will follow-up with patient. Please contact us if you have any additional questions.       Subjective:     Chief Complaint:   Chief Complaint   Patient presents with    Altered Mental Status     Brought in by [facility] from [facility]. Pt reported to have AMS since Sunday and progressively getting worse. Pt was supposed to start hospice today but family rescinded. Nursing home reports decreased appetite and output since Sunday. Last urination yesterday. EMS reports slow arousal to painful stimuli.     64F with stage IV malignancy of presumed left breast primary, presented in 10/2024 with a fungating left breast mass and clinically stage IV breast CA (pathologic L5 fx POA) and was rec'd for urgent onc followup but pt is in denial, amotivational, possibly with an underlying benign personality disorder or mild cognitive impairment.     Pt became bedbound and moved in with her family while concealing her grossly advanced malignancy. She was admitted to this facility on 2/4/25 for acute on chronic low back pain with overwhelming diffuse osseous mets and remaining treatment options were few given ECOG 3 status but pt could likely have tolerated estrogen blockade or trastuzumab (no signs of structural heart disease).    Pt has had declining PO intake over the past week and underwent CT left iliac bone biopsy on 2/25/25 which was technically uncomplicated and pt [facility] to [facility] post-procedure with a referral to enroll under hospice that was declined by family. She was readmitted     Pt had rapid deterioration overnight and was brought to ED in under 24h for lethargy and severe tachypnea (~40 r/min). CXR revealed a new RLL/RML opacity suspicious for aspiration pneumonitis during or  after the biopsy. Started on 10L/min O2 via Ventimask and admitted to ICU.    Interval Hospital Course    2/27: CT head performed revealing bony skull mets but not apparent intracranial process. Started on NIV overnight for tachypnea with standing morphine.     Palliative has been consulted for transitions in care.    At time of interview pt is awake with wide-eyed and distant affect, on NIV with mild discomfort. Pt's NIESHA Pate presented to bedside and I advised her that unfortunately pt has suffered a severe and life limiting complication from her biopsy and at this late stage in her disease process I cannot foresee pt improving to the point of receiving any cancer directed treatments.     More acutely pt's respiratory failure is worsening due to chemical pneumonitis and with no apparent response to temporizing IV steroids amid worsening ICU delirium I am concerned that pt's prognosis is hyperacute and not compatible with survival to discharge.    Writer advised pt's NIESHA Pate and many other relatives assembled for support that her prognosis appears under 72 hours despite current supportive measures and we introduced the option of transitioning to comfort focused care. They accept pt is near end of life and have alerted multiple other family members to come visit urgently, but pt's NIESHA Pate states she will need to feel fully supported to agree to withdraw life sustaining measures.    Family remains broadly labile and in florid anticipatory grief. Offering formal hospice enrollment on paper will be too emotionally charged today.     Will reassess tomorrow but for today we recommend a blended care approach with standing morphine and PRN morphine + ativan.     Family may request transition to Comfort Care Only status whenever they are prepared.    Review of Symptoms      Symptom Assessment (ESAS 0-10 Scale)  Unable to complete assessment due to Patient nonverbal     CAM / Delirium:  Positive  Constipation:   Negative  Diarrhea:  Negative      Pain Assessment in Advanced Demential Scale (PAINAD)   Breathing - Independent of vocalization:  1  Negative vocalization:  0  Facial expression:  2  Body language:  1  Consolability:  2  Total:  6    ECOG Performance Status rdGrdrrdarddrderd:rd rd3rd Living Arrangements:  Lives in nursing home    Psychosocial/Cultural:   See Palliative Psychosocial Note: No  Social Issues Identified: Coping deficit pt/family, New Diagnosis/Trauma, Mental Health, and Capacity/Surrogate Questions  Bereavement Risk: Yes: Close or dependent relationship to the  person, Past history of depression, separation anxiety or post-traumatic stress disorder (PTSD) , and Identified: work/home, financial, intimacy, and caregiver concerns   Caregiver Needs Discussed. Caregiver Distress: Yes: Need for respite, Issues of guilt, Intensity of family caregiving, Caregiver Burnout Risk, and Caregiver support and community resources discussed.    Cultural: Solitary lifestyle, minimal medical contact, gross denial / magical thinking which left   **Primary  to Follow**  Palliative Care  Consult: No    Spiritual:  F - Tamara and Belief:  Restorationist  I - Importance:  [facility]  C - Community:  Home Sikhism  A - Address in Care:   consulted and involved, family arranging for outside  as well     Time-Based Charting:  Yes  Chart Review: 21 minutes  Face to Face: 7 minutes  Symptom Assessment: 16 minutes  Coordination of Care: 19 minutes  Discharge Planning: 10 minutes  Advance Care Plannin minutes  Goals of Care: 40 minutes    Total Time Spent: 125 minutes          ROS:  Review of Systems   Unable to perform ROS: Patient nonverbal         Past Medical History:   Diagnosis Date    Hypertension     Thyroid disease     hypothyroid     Past Surgical History:   Procedure Laterality Date     SECTION      x2     No family history on file.      Review of patient's allergies  indicates:  No Known Allergies    Medications:  Current Medications[1]         Objective:      Physical Exam:  Vitals: Temp: 99.3 °F (37.4 °C) (02/27/25 0745)  Pulse: (!) 115 (02/27/25 1135)  Resp: (!) 26 (02/27/25 1135)  BP: (!) 88/53 (02/27/25 1135)  SpO2: 96 % (02/27/25 1135)    Physical Exam  Constitutional:       General: She is in acute distress.      Appearance: She is cachectic. She is ill-appearing and toxic-appearing. She is not diaphoretic.   HENT:      Head: Normocephalic and atraumatic.      Comments: Profound temporal wasting     Mouth/Throat:      Mouth: Mucous membranes are dry.   Eyes:      Pupils: Pupils are equal, round, and reactive to light.   Cardiovascular:      Rate and Rhythm: Regular rhythm. Tachycardia present.      Pulses: Normal pulses.      Heart sounds: No murmur heard.  Pulmonary:      Effort: Tachypnea, accessory muscle usage and retractions present.      Breath sounds: Decreased air movement present. Examination of the right-middle field reveals decreased breath sounds and rales. Examination of the left-middle field reveals decreased breath sounds and rales. Examination of the right-lower field reveals decreased breath sounds and rales. Examination of the left-lower field reveals decreased breath sounds and rales. Decreased breath sounds and rales present.   Abdominal:      General: Abdomen is flat. Bowel sounds are absent. There is no distension.      Palpations: Abdomen is soft.      Tenderness: There is no abdominal tenderness.   Musculoskeletal:         General: No swelling. Normal range of motion.   Skin:     General: Skin is warm and dry.   Neurological:      Mental Status: She is lethargic.      GCS: GCS eye subscore is 4. GCS verbal subscore is 1. GCS motor subscore is 5.      Motor: Weakness and atrophy present. No tremor or seizure activity.   Psychiatric:         Attention and Perception: She is inattentive.         Speech: She is noncommunicative.         Behavior:  Behavior is slowed and withdrawn. Behavior is cooperative.      Comments: Wide eyed distant affect                 Labs:   Creatinine   Date Value Ref Range Status   02/27/2025 1.2 0.5 - 1.4 mg/dL Final      Hemoglobin   Date Value Ref Range Status   02/27/2025 12.5 12.0 - 16.0 g/dL Final      Albumin   Date Value Ref Range Status   02/26/2025 2.5 (L) 3.5 - 5.2 g/dL Final   02/07/2025 2.9 (L) 3.5 - 5.2 g/dL Final   02/06/2025 3.0 (L) 3.5 - 5.2 g/dL Final          Imaging:     Imaging Results              CT Head Without Contrast (Final result)  Result time 02/26/25 15:37:10      Final result by Alfredo Jqauez III, MD (02/26/25 15:37:10)                   Impression:      No acute process seen.    There are few lucent areas within the posterior skull 1 of which is suspicious for an aggressive process measuring 1.5 cm.  This replaces the type low echo marrow and is demonstrating destruction of the outer and inner table of the skull of the left parieto-occipital junction.  This is worrisome for metastatic disease, or multiple myeloma.  Langerhans cell histiocytosis is thought to be less likely.      Electronically signed by: Alfredo Jaquez MD  Date:    02/26/2025  Time:    15:37               Narrative:    EXAMINATION:  CT HEAD WITHOUT CONTRAST    CLINICAL HISTORY:  Mental status change, unknown cause;    FINDINGS:  No bleed, mass, or mass effect seen.  The brain parenchyma is unremarkable.  No early infarct changes are seen.  There is a lytic area of the left parieto-occipital skull.                                       X-Ray Chest AP Portable (Final result)  Result time 02/26/25 14:07:42      Final result by Alfredo Jaquez III, MD (02/26/25 14:07:42)                   Impression:      Possible right middle lobe pneumonia and a right pleural effusion.      Electronically signed by: Alfredo Jaquez MD  Date:    02/26/2025  Time:    14:07               Narrative:    EXAMINATION:  XR CHEST AP PORTABLE    CLINICAL  HISTORY:  Sepsis;    FINDINGS:  Chest one view:    Central line tip lower SVC.  Heart size is normal.  There is right middle lobe infiltrate versus atelectasis and elevation of the right diaphragm.  There is a possible right pleural effusion perhaps a sub pulmonic pleural effusion.  Left lung demonstrates some minimal scar in the left lower lobe.  There is baseline DJD.                                            I spent a total of 73 minutes on the day of the visit. This includes face to face time in discussion of goals of care, symptom assessment, coordination of care and emotional support.  This also includes non-face to face time preparing to see the patient (eg, review of tests/imaging), obtaining and/or reviewing separately obtained history, documenting clinical information in the electronic or other health record, independently interpreting results and communicating results to the patient/family/caregiver, or care coordinator.     Additional 52 min time spent on a voluntary advance care planning and /or goals of care discussion, providing emotional support, formulating, and communicating prognosis and exploring burden/benefit of various approaches of treatment.       Thank you for the opportunity to care for this patient and family.       Clive Rivera Jr, MD    Advance Care Planning     Date: 02/27/2025    Code Status  In light of the patients advanced and life limiting illness,I engaged the the family in a voluntary conversation about the patient's preferences for care  at the very end of life. The patient wishes to have a natural, peaceful death.  Along those lines, the patient does not wish to have CPR or other invasive treatments performed when her heart and/or breathing stops. I communicated to the family that a DNR order would be placed in her medical record to reflect this preference.    A total of 52 min was spent on advance care planning, goals of care discussion, emotional support, formulating and  communicating prognosis and exploring burden/benefit of various approaches of treatment. This discussion occurred on a fully voluntary basis with the verbal consent of the patient and/or family.     [facility]  I engaged the family in a voluntary conversation about advance care planning and we specifically addressed what the goals of care would be moving forward, in light of the patient's change in clinical status, specifically aggressively metastatic cancer and respiratory distress following biopsy under sedation due to aspiration pneumonitis.  We did specifically address the patient's likely prognosis, which is poor.  We explored the patient's values and preferences for future care.  The family endorses that what is most important right now is to focus on symptom/pain control and improvement in condition but with limits to invasive therapies    Accordingly, we have decided that the best plan to meet the patient's goals includes pivot to comfort-focused care    A total of 52 min was spent on advance care planning, goals of care discussion, emotional support, formulating and communicating prognosis and exploring burden/benefit of various approaches of treatment. This discussion occurred on a fully voluntary basis with the verbal consent of the patient and/or family.     Hospice  I did not explain the role for hospice care at this stage of the patient's illness, including its ability to help the patient live with the best quality of life possible.  We will notbe making a hospice referral.             [1]   Current Facility-Administered Medications:     acetaminophen suppository 650 mg, 650 mg, Rectal, Q4H PRN, Nia Means DNP    albuterol-ipratropium 2.5 mg-0.5 mg/3 mL nebulizer solution 3 mL, 3 mL, Nebulization, Q4H WAKE, Nia Means DNP, 3 mL at 02/27/25 1135    azithromycin (ZITHROMAX) 500 mg in 0.9% NaCl 250 mL IVPB (admixture device), 500 mg, Intravenous, Q24H, Zoraida Gomez MD, Stopped at 02/27/25 1138     bisacodyL suppository 10 mg, 10 mg, Rectal, Daily PRN, Nia Means DNP    calcitonin injection 180 Units, 4 Units/kg, Subcutaneous, Q12H, Smita Singh MD, 180 Units at 02/27/25 0842    dexAMETHasone tablet 2 mg, 2 mg, Oral, Daily, Paddy Grazon MD    dextrose 50% injection 12.5 g, 12.5 g, Intravenous, PRN, Nia Means DNP    dextrose 50% injection 25 g, 25 g, Intravenous, PRN, Nia Means DNP    enoxaparin injection 30 mg, 30 mg, Subcutaneous, Q24H (prophylaxis, 1700), Smita Singh MD    furosemide injection 60 mg, 60 mg, Intravenous, Q12H, Zoraida Gomez MD, 60 mg at 02/27/25 0957    glucagon (human recombinant) injection 1 mg, 1 mg, Intramuscular, PRN, Nia Means DNP    glucose chewable tablet 16 g, 16 g, Oral, PRN, Nia Means DNP    glucose chewable tablet 24 g, 24 g, Oral, PRN, Nia Means DNP    glycopyrrolate (PF) injection 0.1 mg, 0.1 mg, Intravenous, TID PRN, Clive Rivera Jr., MD    ipratropium 0.02 % nebulizer solution 0.5 mg, 0.5 mg, Nebulization, Q6H PRN, Nia Means DNP    levothyroxine tablet 150 mcg, 150 mcg, Oral, Daily, Nia Means DNP    LORazepam injection 2 mg, 2 mg, Intravenous, Q4H, Clive Rivera Jr., MD, 2 mg at 02/27/25 1136    LORazepam injection 2 mg, 2 mg, Intravenous, Q4H PRNNicole James K. Jr., MD    metoclopramide injection 5 mg, 5 mg, Intravenous, Q6H PRNNicole James K. Jr., MD    morphine injection 4 mg, 4 mg, Intravenous, Q4H, Clive Rivera Jr., MD    morphine injection 4 mg, 4 mg, Intravenous, Q2H PRN, Clive Rivera Jr., MD    mupirocin 2 % ointment, , Nasal, BID, Paddy Garzon MD, Given at 02/27/25 0817    naloxone 0.4 mg/mL injection 0.02 mg, 0.02 mg, Intravenous, PRN, Nia Means, MIKE    NORepinephrine 4 mg in dextrose 5% 250 mL infusion (premix), 0-3 mcg/kg/min, Intravenous, Continuous, Boy Root MD, Last Rate: 9.6 mL/hr at 02/26/25 1602, 0.05 mcg/kg/min at 02/26/25  1602    OLANZapine injection 5 mg, 5 mg, Intramuscular, Once PRN, Clive Rivera Jr., MD    ondansetron injection 8 mg, 8 mg, Intravenous, Q8H PRN, Clive Rivera Jr., MD    piperacillin-tazobactam (ZOSYN) 4.5 g in D5W 100 mL IVPB (MB+), 4.5 g, Intravenous, Q8H, Nia Means DNP, Stopped at 02/27/25 0946    potassium chloride 20 mEq in D5 and 0.9% NaCl 1,010 mL infusion, , Intravenous, Continuous, Zoraida Gomez MD, Last Rate: 250 mL/hr at 02/27/25 1128, New Bag at 02/27/25 1128    sodium chloride 0.9% flush 10 mL, 10 mL, Intravenous, Q12H PRN, Nia Means DNP    thiamine injection 400 mg, 400 mg, Intravenous, Daily, Smita Singh MD, 400 mg at 02/27/25 0957    Pharmacy to dose Vancomycin consult, , , Once **AND** vancomycin - pharmacy to dose, , Intravenous, pharmacy to manage frequency, Nia Means, MIKE

## 2025-02-27 NOTE — CONSULTS
[facility] - Intensive Care  Pulmonology  Consult Note    Patient Name: Nora Torres  MRN: [id number]  Admission Date: 2/26/2025  Hospital Length of Stay: 1 days  Code Status: DNR  Attending Physician: Paddy Garzon,*  Primary Care Provider: Hernandez Booker MD   Principal Problem: Acute respiratory failure with hypoxia      Subjective:     HPI:  64 year old woman with history of metastatic breast cancer including to the bone and skull, hypertension, and hypothyroidism that presents to the ED from [facility] for evaluation of altered mental status for 1 day and decreased urine output.    On presentation, patient was afebrile but tachycardic, tachypneic and hypotensive. Patient required 4L to maintain oxygen sat. Labs remarkable for leukocytosis, hypokalemia, azotemia, anion gap metabolic acidosis and severe hypercalcemia. UA consistent with infection. CT head showed lytic skull lesions. CXR showed right middle lobe infiltrates vs atelectasis. Patient received 30 cc/kg LR IVF, along with empiric antibiotics zosyn, azithromycin and vancomycin.  Good cardiac contractility on bedside ultrasound. No pleural effusion identified.  Of note, patient had recent bone biopsy, result is pending.  Patient was started on levophed; central line was placed and admitted to ICU for further management.    This morning was more alert though lethargic       Assessment/Plan:   Acute encephalopathy  Likely multifactorial 2/2 severe hypercalcemia and possible sepsis from UTI/Pneumonia   CT Head demonstrated lytic lesions to skull, no intracranial lesions  Continue management of hypercalcemia and sepsis  Delirium precautions    Shock, resolved  Likely 2/2 to hypovolemia and sepsis due to UTI and possible pneumonia  Follow cultures  Continue IVF, D5NS at 250/hr +lasix 60mg BID  Continue empiric antibiotics with ceftriaxone and azithromycin  Continue Levophed to keep MAP > 65  Continuous cardiac monitoring  Monitor  hemodynamics    Acute hypoxemic respiratory failure  Due to above  Continue oxygen supplementation   Wean FiO2 as tolerated    Severe hypercalcemia  Likely due to metastatic breast cancer to the bone  PTH 35, PTHrP elevated to 9.8 3 weeks ago, repeat pending  TSH and phosphorus normal   Continue IVF with normal saline at 250ml/hr  Calcitonin 180u q12  S/p 1 dose zoledronic acid  Monitor calcium and phosphorus level  Strict I/O charting    NANCY, improving  Likely due to hypercalcemia and hypovolemia  Management as above    Hypokalemia   Magnesium and potassium repletion IV     Feeding/fluids: Normal saline  Analgesia: Morphine PRN  Sedation: N/A  Thromboprophylaxis: Enoxaparin  Head up position: Yes  Ulcer prophylaxis: N/A  Glycemic control: Yes  Spontaneous breathing trial: N/A  Bowel care: Yes  Indwelling catheter removal: Right IJV, 2 peripheral line  Deescalation of antibiotics: N/A    Code:   Extension discussion with the family member including her sister, patient currently have severe hypercalcemia due to her underlying breast cancer that has spread to her bone. Given her terminal cancer, family elected for no chest compression and no intubation. They are okay with continuation of current treatment and treating of her hypercalcemia and infection with medications. Code status updated in EPIC.  Will also put in palliative care consultation.       Zoraida Gomez MD MPH  [employer] Emergency Medicine/Internal Medicine PGY-IV  [place] - Intensive Care

## 2025-02-27 NOTE — PROGRESS NOTES
Smoking cessation education note: Unable to assess nicotine withdrawal symptoms at this time due to pt's altered mental state. Will follow up as needed

## 2025-02-27 NOTE — SUBJECTIVE & OBJECTIVE
Past Medical History:   Diagnosis Date    Hypertension     Thyroid disease     hypothyroid       Past Surgical History:   Procedure Laterality Date     SECTION      x2       Review of patient's allergies indicates:  No Known Allergies    Family History    None       Tobacco Use    Smoking status: Every Day     Current packs/day: 0.90     Average packs/day: 0.9 packs/day for 48.2 years (43.3 ttl pk-yrs)     Types: Cigarettes     Start date:     Smokeless tobacco: Not on file    Tobacco comments:     Pt is a (just under) 1 pk/day cigarette smoker x 48 yrs. Order requested for 21 mg nicotine patch Q day. Handout provided.   Substance and Sexual Activity    Alcohol use: No    Drug use: Not Currently    Sexual activity: Not Currently         Review of Systems   Reason unable to perform ROS: Limited due to AMS.     Objective:     Vital Signs (Most Recent):  Temp: 96.8 °F (36 °C) (25 1700)  Pulse: 108 (25 1749)  Resp: (!) 25 (25 1749)  BP: 131/76 (25 1749)  SpO2: (!) 94 % (25 1749) Vital Signs (24h Range):  Temp:  [96.8 °F (36 °C)-98.2 °F (36.8 °C)] 96.8 °F (36 °C)  Pulse:  [] 108  Resp:  [25-31] 25  SpO2:  [86 %-97 %] 94 %  BP: ()/(42-97) 131/76     Weight: 45 kg (99 lb 3.3 oz)  Body mass index is 17.03 kg/m².    No intake or output data in the 24 hours ending 25 1806     Physical Exam  Vitals reviewed.   Constitutional:       General: She is not in acute distress.     Appearance: She is ill-appearing.   HENT:      Head: Normocephalic and atraumatic.      Mouth/Throat:      Mouth: Mucous membranes are dry.   Eyes:      Conjunctiva/sclera: Conjunctivae normal.   Cardiovascular:      Rate and Rhythm: Regular rhythm. Tachycardia present.      Heart sounds: No murmur heard.  Pulmonary:      Effort: Pulmonary effort is normal. No respiratory distress.      Breath sounds: No wheezing or rhonchi.   Abdominal:      General: There is no distension.      Palpations:  Abdomen is soft.   Musculoskeletal:      Right lower leg: No edema.      Left lower leg: No edema.   Skin:     Comments: Left breast cancer with necrotic and fungating wounds      Neurological:      Mental Status: She is disoriented.          Vents:       Lines/Drains/Airways       Central Venous Catheter Line  Duration             Percutaneous Central Line - Triple Lumen  02/26/25 Internal Jugular Right <1 day              Peripheral Intravenous Line  Duration                  Peripheral IV - Single Lumen 02/26/25 1259 16 G Anterior;Right Other <1 day         Peripheral IV - Single Lumen 02/26/25 1300 20 G Anterior;Right Forearm <1 day                    Significant Labs:    CBC/Anemia Profile:  Recent Labs   Lab 02/26/25  1330 02/26/25  1332   WBC 16.47*  --    HGB 12.6  --    HCT 39.4 40.4     --    MCV 87  --    RDW 17.9*  --         Chemistries:  Recent Labs   Lab 02/26/25  1330      K 3.1*      CO2 16*   BUN 61*   CREATININE 1.5*   CALCIUM 15.5*   ALBUMIN 2.5*   PROT 6.4   BILITOT 1.8*   ALKPHOS 225*   ALT 48*   AST 71*       All pertinent labs within the past 24 hours have been reviewed.    Significant Imaging:   I have reviewed all pertinent imaging results/findings within the past 24 hours.

## 2025-02-27 NOTE — HPI
64 year-old female with likely metastatic breast cancer (biopsy results are pending) was admitted on 2/26/25 for acute-on-chronic respiratory failure. Consult is for breast cancer.

## 2025-02-27 NOTE — ASSESSMENT & PLAN NOTE
- due to hypercalcemia and sepsis vs cancer  - treating with IVF and antibiotics  - hopefully, will have improvement in mentation enough to participate more fully in GOC discussion with treatment of hypercalcemia/infection, although unclear if this will be the case

## 2025-02-27 NOTE — PLAN OF CARE
Pt readmit into hospital. SW rounded on pt with MD. Pt sister  present at bedside. Pt is a long term resident at [facility]. SW will follow up with pts family to complete dc assessment. SW will continue to follow pt throughout her transitions of care and assist with any dc needs. Updated clinicals sent via [employer] to [facility].  Palliative care will be consulted.      02/27/25 0840   Rounds   Attendance Provider;;Assigned nurse   Discharge Plan A Return to nursing home   Why the patient remains in the hospital Requires continued medical care   Transition of Care Barriers None

## 2025-02-27 NOTE — PROGRESS NOTES
Medical Decision Making    Admission Date: 2/26/2025     I have reviewed medical record data and the patient was evaluated. See the trainee's note for details. As the teaching physician, I was present for and have confirmed the key portions of the service performed by the resident today.     64-yo (PMH: recent dx metastatic breast cancer including to the bone and brain, hypertension, hypothyroidism) admitted from [facility] for evaluation of altered mental status. Ca++ 15.5    Interval History:      Problems:  Severe Hypercalcemia  Encephalopathy  Extensively Metastatic Breast Cancer  Sepsis - UTI & RML Opacity - suggests atelectasis  NANCY  Cancer Pain     Plan:  Calcitonin, zoledronic acid, 250-mL NS / hr with lasix to avoid volume overload  Ongoing GOC Discussions - DNR - Re-evaluate after calcium normalizes.       Critical Care time was spent validating the history and physical exam, reviewing the lab and imaging results, and discussing the care of the patient with the bedside nurse and the patient and/or surrogates. This critical care time did not overlap with that of any other provider or involve time for any procedures.  This patient has a high probability of sudden clinically significant deterioration which requires the highest level of physician preparedness to intervene urgently. I managed/supervised life or organ supporting interventions that required frequent physician assessments. I devoted my full attention in the ICU to the direct care of this patient for this period of time. Organ systems which are failing and require intensive, critical care support are: cardiovascular, respiratory, GI, renal, infectious diseases.   Critical Care time: 45 minutes    Zaki Duffy MD  [facility]  [phone]  \

## 2025-02-27 NOTE — PROGRESS NOTES
"Pharmacokinetic Initial Assessment: IV Vancomycin    Assessment/Plan:    Initiate intravenous vancomycin with loading dose of 1250 mg once with subsequent doses when random concentrations are less than 20 mcg/mL  Desired empiric serum trough concentration is 10 to 15 mcg/mL  Draw vancomycin random level on 2-27 at 16:00.  Pharmacy will continue to follow and monitor vancomycin.      Please contact pharmacy at extension [phone] with any questions regarding this assessment.     Thank you for the consult,   Mina Overton       Patient brief summary:  Nora Torres is a 64 y.o. female initiated on antimicrobial therapy with IV Vancomycin for treatment of suspected sepsis    Drug Allergies:   Review of patient's allergies indicates:  No Known Allergies    Actual Body Weight:   45 kg    Renal Function:   Estimated Creatinine Clearance: 27.6 mL/min (A) (based on SCr of 1.5 mg/dL (H)).,     Dialysis Method (if applicable):  N/A    CBC (last 72 hours):  Recent Labs   Lab Result Units 02/26/25  1330   WBC K/uL 16.47*   Hemoglobin g/dL 12.6   Hematocrit % 39.4   Platelets K/uL 342   Gran % % 68.8   Lymph % % 22.2   Mono % % 7.9   Eosinophil % % 0.0   Basophil % % 0.2   Differential Method  Automated       Metabolic Panel (last 72 hours):  Recent Labs   Lab Result Units 02/26/25  1330 02/26/25  1556   Sodium mmol/L 144  --    Potassium mmol/L 3.1*  --    Chloride mmol/L 110  --    CO2 mmol/L 16*  --    Glucose mg/dL 175*  --    Glucose, UA   --  Negative   BUN mg/dL 61*  --    Creatinine mg/dL 1.5*  --    Albumin g/dL 2.5*  --    Total Bilirubin mg/dL 1.8*  --    Alkaline Phosphatase U/L 225*  --    AST U/L 71*  --    ALT U/L 48*  --        Drug levels (last 3 results):  No results for input(s): "VANCOMYCINRA", "VANCORANDOM", "VANCOMYCINPE", "VANCOPEAK", "VANCOMYCINTR", "VANCOTROUGH" in the last 72 hours.    Microbiologic Results:  Microbiology Results (last 7 days)       Procedure Component Value Units Date/Time    Urine " culture [[id number]] Collected: 02/26/25 1556    Order Status: No result Specimen: Urine Updated: 02/26/25 1620    Influenza A & B by Molecular [[id number]] Collected: 02/26/25 1259    Order Status: Sent Specimen: Nasopharyngeal Swab Updated: 02/26/25 1450    Blood culture x two cultures. Draw prior to antibiotics. [[id number]] Collected: 02/26/25 1348    Order Status: Sent Specimen: Blood from Line, Jugular, External Right     Blood culture x two cultures. Draw prior to antibiotics. [[id number]] Collected: 02/26/25 1337    Order Status: Sent Specimen: Blood from Line, Jugular, External Right

## 2025-02-27 NOTE — PLAN OF CARE
MANPREET contacted [facility] and spoke with admissions to complete dc assessment. MANPREET made aware pt is a nursing home resides at NH. Pt is bed bound . Pt receives assistance with ADLs from NH staff. MANPREET confirmed pts point of contacts which are correct on chart. At time of dc pt will require ambulance transport to return to NH.     MANPREET contacted pts sister who  confirmed information above. All contact confirmed on chart. Pts sister confirmed at time of dc pt will return to [facility]. MANPREET will continue to follow pt throughout her transitions of care and assist with any dc needs.     Bailey Mayers (Sister)  [phone]        02/27/25 1213   Discharge Assessment   Assessment Type Discharge Planning Assessment   Confirmed/corrected address, phone number and insurance Yes   Confirmed Demographics Correct on Facesheet   Source of Information facility verbal report   Communicated DRU with patient/caregiver Yes   Reason For Admission Acute respiratory failure with hypoxia   People in Home facility resident   Facility Arrived From: [facility]   Do you expect to return to your current living situation? Yes   Do you have help at home or someone to help you manage your care at home? Yes   Who are your caregiver(s) and their phone number(s)? Mark Mayersbrennan (Sister)  [phone]   Prior to hospitilization cognitive status: Unable to Assess   Current cognitive status: Unable to Assess   Walking or Climbing Stairs Difficulty yes   Dressing/Bathing Difficulty yes   Home Layout Able to live on 1st floor   Patient currently being followed by outpatient case management? No   Do you currently have service(s) that help you manage your care at home? No   Do you take prescription medications? Yes   Do you have prescription coverage? Yes   Coverage : [place]   Do you have any problems affording any of your prescribed medications? No   Is the patient taking medications as prescribed? yes   Who is going to help you get home at discharge?  Pt will require ambulance transport   How do you get to doctors appointments? agency   Are you on dialysis? No   Do you take coumadin? No   Discharge Plan A Return to nursing home   DME Needed Upon Discharge  none   Transition of Care Barriers None   OTHER   Name(s) of People in Home Pt resides at [facility]

## 2025-02-27 NOTE — ASSESSMENT & PLAN NOTE
Concern for cachexia as evidenced by BMI less than 20 in the presence of known chronic disease, loss of muscle mass, and albumen level less than 3.2. Contributing factors include underlying Malignancy. Treatment to include management of underlying chronic disease and palliative consultation.      Body mass index is 17.03 kg/m².  Albumin   Date Value Ref Range Status   02/26/2025 2.5 (L) 3.5 - 5.2 g/dL Final

## 2025-02-27 NOTE — PROGRESS NOTES
Pharmacist Renal Dose Adjustment Note    Nora Torres is a 64 y.o. female being treated with the medication enoxaparin    Patient Data:    Vital Signs (Most Recent):  Temp: 96.8 °F (36 °C) (02/26/25 1700)  Pulse: 108 (02/26/25 1749)  Resp: (!) 25 (02/26/25 1749)  BP: 131/76 (02/26/25 1749)  SpO2: (!) 94 % (02/26/25 1749) Vital Signs (72h Range):  Temp:  [96.8 °F (36 °C)-98.2 °F (36.8 °C)]   Pulse:  []   Resp:  [10-31]   BP: ()/(42-97)   SpO2:  [86 %-97 %]      Recent Labs   Lab 02/26/25  1330   CREATININE 1.5*     Serum creatinine: 1.5 mg/dL (H) 02/26/25 1330  Estimated creatinine clearance: 27.6 mL/min (A)    Enoxaparin 40 mg SubQ every 24 hours will be changed to enoxaparin 30 mg SubQ every 24 hours per protocol    Pharmacist's Name: Abdullahi Schaefer  Pharmacist's Extension: [phone]

## 2025-02-27 NOTE — ACP (ADVANCE CARE PLANNING)
Advance Care Planning     Date: 02/27/2025    [facility]  I engaged the family in a voluntary conversation about advance care planning and we specifically addressed what the goals of care would be moving forward, in light of the patient's change in clinical status, specifically hypercalcemia of malignancy, septic shock.  We did specifically address the patient's likely prognosis, which is poor.  We explored the patient's values and preferences for future care.  The family endorses that what is most important right now is to focus on symptom/pain control    Accordingly, we have decided that the best plan to meet the patient's goals includes no further escalation in treatment and possible transition to comfort care measures    For now, will continue to treat sepsis and hypercalcemia in time limited trial to see if patient has meaningful improvement in mentation that would allow her to participate in ongoing goals of care discussion. However, cancer is end stage and irreversible; current decompensation is directly related to cancer. She would certainly be appropriate for transition to hospice care and this does seem most aligned with goals of care as evidenced by family members.      A total of 16 min was spent on advance care planning, goals of care discussion, emotional support, formulating and communicating prognosis and exploring burden/benefit of various approaches of treatment. This discussion occurred on a fully voluntary basis with the verbal consent of the patient and/or family.

## 2025-02-27 NOTE — HPI
64 year old woman with history of metastatic breast cancer including to the bone and brain, hypertension, and hypothyroidism that presents to the ED from [facility] for evaluation of altered mental status for 1 day and decreased urine output.    On presentation, patient was afebrile but tachycardia, tachypnea and hypotension. Patient required 4L to maintain oxygen sat. Labs remarkable for leucocytosis, hypokalemia, azotemia, anion gap metabolic acidosis and severe hypercalcemia. UA positive for infection. CT head showed lytic brain lesions. CXR showed right middle lobe infiltrates vs atelectasis. Patient received sepsis bolus LR IVF, along with empiric antibiotics with zosyn, azithromycin and vancomycin. Patient was started on levophed and central line was placed and admitted to ICU for further management. Good cardiac contraction on POCUS and no pleural effusion appreciated.  Of note, patient had recent bone biopsy, result is pending.

## 2025-02-28 LAB
BACTERIA UR CULT: ABNORMAL
COMMENT: ABNORMAL
FINAL PATHOLOGIC DIAGNOSIS: ABNORMAL
GROSS: ABNORMAL
Lab: ABNORMAL

## 2025-02-28 NOTE — CARE UPDATE
Patient declining on palliative care.  No spontaneous breathing or pulse.  Patient pronounced dead at 10:21 p.m.  Family at bedside.  Patient's body to be released to  home.  Death certificate to be completed by attending physician.

## 2025-02-28 NOTE — CARE UPDATE
Called to bedside to speak with family of Ms. Nora Torres, 63 yo female, admitted with septic shock in setting of malignancy, hypercalcemia and encephalopathy. Family request to continue treatment for sepsis and hypercalcemia as discussed with attending physician today. They agree with comfort measures however desire to keep all medications including vasopressors going until patient's son arrive to hospital on tomorrow. Plan discussed with nursing staff.

## 2025-02-28 NOTE — PLAN OF CARE
Pt no longer on comfort measures per family request. Levo gtt infusing at 1.3 mcg/kg/min and vasopressin gtt 0.04 units/min. BIPAP initiated, settings at 10/5 O2 95%.     Problem: Adult Inpatient Plan of Care  Goal: Plan of Care Review  Outcome: Progressing  Goal: Patient-Specific Goal (Individualized)  Outcome: Progressing  Goal: Absence of Hospital-Acquired Illness or Injury  Outcome: Progressing  Goal: Optimal Comfort and Wellbeing  Outcome: Progressing  Goal: Readiness for Transition of Care  Outcome: Progressing     Problem: Sepsis/Septic Shock  Goal: Absence of Bleeding  Outcome: Progressing  Goal: Blood Glucose Level Within Targeted Range  Outcome: Progressing  Goal: Absence of Infection Signs and Symptoms  Outcome: Progressing     Problem: Acute Kidney Injury/Impairment  Goal: Fluid and Electrolyte Balance  Outcome: Progressing     Problem: Pneumonia  Goal: Fluid Balance  Outcome: Progressing  Goal: Resolution of Infection Signs and Symptoms  Outcome: Progressing

## 2025-02-28 NOTE — PLAN OF CARE
Received pt on the following bipap settings. Given tx; will cont to monitor. Unable to obtain SP02 at this time.   Problem: Adult Inpatient Plan of Care  Goal: Plan of Care Review  Outcome: Progressing

## 2025-02-28 NOTE — NURSING
Post mortem care complete. Body in bag. Dentures in denture cup put inside ziplock and secured to patient body with tape also. The dentures placed were top plate and bottom plate. Security notified that body ready [place] [facility]

## 2025-02-28 NOTE — NURSING
CTA ordered. Pt on comfort measures. Notified PATTIE Amato. Patient not stable enough for transport. CT tech aware.

## 2025-02-28 NOTE — EICU
BP 60/40 , Pt is almost maxed on Levophed 2.3 mcg/kg/min and on Vaso @ 0.04.   Hydrocortisone 100 mg iv q8 and jim qtt ordered.  D/w RN

## 2025-03-01 LAB
OHS QRS DURATION: 82 MS
OHS QTC CALCULATION: 429 MS

## 2025-03-03 LAB
BACTERIA BLD CULT: NORMAL
BACTERIA BLD CULT: NORMAL

## 2025-03-03 NOTE — DISCHARGE SUMMARY
[facility] Medicine  Discharge Summary      Patient Name: Nora Torres  MRN: [id number]  STEFANIE: [id number]  Patient Class: IP- Inpatient  Admission Date: 2/26/2025  Hospital Length of Stay: 1 days  Discharge Date and Time:    Attending Physician: No att. providers found   Discharging Provider: Paddy Garzon MD  Primary Care Provider: Hernandez Booker MD    Primary Care Team: Networked reference to record PCT     HPI:   Nora Torres a 64-year old female with PMHx of HTN, hypothyroidism, L-breast CA, and lytic mets came into the ER from NH with AMS.Yesterday, the patient had a CT guided L-illiac bone lesion biopsy with IR then was discharged back to [facility]. Today patient returned with AMS and starring gaze according to ER doctor and family reports no urinary output since yesterday. During my exam, patient eyes upon to verbal stimuli, but no comprehensible response. Patient doesn't follow commands, unable to voice if she is in pain, but is moaning while at the beside. Patient ROS + hypoxia requiring 15L non-rebreather, course lungs with crackles L>R, L-breast necrotic with open wound with purulent drainage (see media), and AMS. Patient was recently discharge on 2/14/25 for back back pain with compression fx which lead to outpatient biopsy, then opted to discharge to NH.     ED course:   Labs:   WBC 16.47, K 3.1, CO2 16, Anion Gap 18, BUN 61, Cr 1.5, eGFR 39, Glucose 175, Ca 15.5, , AST/ALT 71/48, , Troponin 0.181.  Diagnostics:  CT of head without contrast: No acute process seen. There are few lucent areas within the posterior skull 1 of which is suspicious for an aggressive process measuring 1.5 cm.  This replaces the type low echo marrow and is demonstrating destruction of the outer and inner table of the skull of the left parieto-occipital junction.  This is worrisome for metastatic disease, or multiple myeloma.  Langerhans cell histiocytosis is thought to be less  likely.   Chest xray: Possible right middle lobe pneumonia and a right pleural effusion.   Medication: albuterol-iprotopium, LR 1539cc, Narcan 1mg, zosyn 4.5g, vancomycin 1250mg, Norepinephrine  Vitals: 98.2F, /42-97, (), , 27-49, 86-92%       * No surgery found *      Hospital Course:   Ms. Torres presented with septic shock with metabolic encephalopathy and hypercalcemia of malignancy. Initially managed with pressors, broad spectrum antibiotics, IVF, and calcitonin/zoledronic acid for hypercalcemia. Unfortunately, despite this management, she developed worsening shock. Discussions with family members and determined that best course would be to focus predominantly on comfort. Attempted to maintain pressor support as additional family members arriving to visit her. She  peacefully at 10:21pm on 25.     Goals of Care Treatment Preferences:  Code Status: DNR    Health care agent: Herlinda Torres (sister)  Health care agent number: [phone]          What is most important right now is to focus on symptom/pain control.  Accordingly, we have decided that the best plan to meet the patient's goals includes no further escalation in treatment, possible transition to comfort care measures.         Consults:   Consults (From admission, onward)          Status Ordering Provider     Inpatient consult to Palliative Care  Once        Provider:  Clive Rievra Jr., MD    Completed JEMMA LEE     Inpatient consult to Hematology/Oncology  Once        Provider:  Peng Jasso MD    Completed REINA SWEET            * Acute respiratory failure with hypoxia  Patient with Hypoxic Respiratory failure which is Acute.  she is not on home oxygen. Supplemental oxygen was provided and noted-      .   Signs/symptoms of respiratory failure include- respiratory distress, wheezing, and lethargy. Contributing diagnoses includes - Pleural effusion and Pneumonia Labs and images were reviewed. Patient Has  recent ABG, which has been reviewed. Will treat underlying causes and adjust management of respiratory failure as follows    -supplemental O2  -treat PNA with IV antibiotics    Cancer cachexia  Concern for cachexia as evidenced by BMI less than 20 in the presence of known chronic disease, loss of muscle mass, and albumen level less than 3.2. Contributing factors include underlying Malignancy. Treatment to include management of underlying chronic disease and palliative consultation.      Body mass index is 17.03 kg/m².  Albumin   Date Value Ref Range Status   02/26/2025 2.5 (L) 3.5 - 5.2 g/dL Final         Nursing home resident  - bedbound at baseline but with fluent speech      Encephalopathy, metabolic  - due to hypercalcemia and sepsis vs cancer  - treating with IVF and antibiotics  - hopefully, will have improvement in mentation enough to participate more fully in GOC discussion with treatment of hypercalcemia/infection, although unclear if this will be the case      Open wound of left breast     Left breast wound necrotic, open with purulent drainage  -heme/onc consulted  -pain management  -wound care consulted      Pleural effusion  Patient found to have moderate pleural effusion on imaging. I have not personally reviewed and interpreted the following imaging: Xray. A thoracentesis was deferred. Most likely etiology includes Pneumonia. Management to include  treatment of infection      NANCY (acute kidney injury)  NANCY is likely due to pre-renal azotemia due to intravascular volume depletion. Baseline creatinine is  0.6-0.8 . Most recent creatinine and eGFR are listed below.  Recent Labs     02/26/25  1330   CREATININE 1.5*   EGFRNORACEVR 39*      Plan  - NANCY is  acute onset  - Avoid nephrotoxins and renally dose meds for GFR listed above  - Monitor urine output, serial BMP, and adjust therapy as needed  -IV Fluids NS @ 100cc  -due to hypercalcemia/shock    Cystitis  Patient has AMS, WBC 16.47, UA positive for  leukocytes, nitrates, WBC  -covered with zosyn      Septic shock  This patient has shock. The type of shock is  septic . The patient has the following evidence of shock: persistent hypotension, NANCY, altered mental status, and hypoxia. The patient will be admitted to an intensive care unit, they will be treated with IV fluids, ABX, and pressors.    CAP (community acquired pneumonia)  Patient has a diagnosis of pneumonia. The cause of the pneumonia is suspected to be bacterial in etiology but organism is not known. The pneumonia is  acute onset . The patient has the following signs/symptoms of pneumonia: persistent hypoxia  and shortness of breath. The patient does have a current oxygen requirement and the patient does not have a home oxygen requirement. I have reviewed the pertinent imaging. The following cultures have been collected: Blood cultures The culture results are listed below.     Current antimicrobial regimen consists of the antibiotics listed below. Will monitor patient closely and Adjust treatment plan as follows will de-escalate ABX based on pending cultures    Antibiotics (From admission, onward)      Start     Stop Route Frequency Ordered    02/26/25 1800  cefTRIAXone injection 1 g         -- IV Every 24 hours (non-standard times) 02/26/25 1647    02/26/25 1800  azithromycin (ZITHROMAX) 500 mg in 0.9% NaCl 250 mL IVPB (admixture device)         03/03/25 1759 IV Every 24 hours (non-standard times) 02/26/25 1657    02/26/25 1300  vancomycin 1,250 mg in 0.9% NaCl 250 mL IVPB (admixture device)  (Sepsis Workup)         -- IV Once 02/26/25 1259            Microbiology Results (last 7 days)       Procedure Component Value Units Date/Time    Urine culture [[id number]] Collected: 02/26/25 1556    Order Status: No result Specimen: Urine Updated: 02/26/25 1620    Influenza A & B by Molecular [[id number]] Collected: 02/26/25 1259    Order Status: Sent Specimen: Nasopharyngeal Swab Updated: 02/26/25 1450    Blood  culture x two cultures. Draw prior to antibiotics. [[id number]] Collected: 02/26/25 1348    Order Status: Sent Specimen: Blood from Line, Jugular, External Right     Blood culture x two cultures. Draw prior to antibiotics. [[id number]] Collected: 02/26/25 1337    Order Status: Sent Specimen: Blood from Line, Jugular, External Right             Hypercalcemia of malignancy  Hypercalcemia is likely due to Malignancy. The patient has the following symptoms due to their hypercalcemia: weakness and encephalopathy. Their most recent calcium and albumin results are listed below.  Recent Labs     02/26/25  1330   CALCIUM 15.5*   ALBUMIN 2.5*     Plan  - Obtain the following labs to work up the cause of hypercalcemia: alkaline phosphatase   Alkaline Phosphatase   Date Value Ref Range Status   02/26/2025 225 (H) 40 - 150 U/L Final    .   - Treat the hypercalcemia with: IV fluids ordered at a rate of 100 ml/hr, Bisphosphonates, and Calcitonin.   - The patient's hypercalcemia is improving but not resolved.       Malignant neoplasm of overlapping sites of left female breast  Patient has metastatic cancer of unknown primary. The cancer has metastasized to bones. The patient is not under the care of an outpatient oncologist. The patient is not undergoing active chemotherapy. .Their staging information is listed below.    Cancer Staging   No matching staging information was found for the patient.    -Heme/Onc consulted  -f/u on biopsy results        Final Active Diagnoses:    Diagnosis Date Noted POA    PRINCIPAL PROBLEM:  Acute respiratory failure with hypoxia [J96.01] 02/08/2025 Yes    Encephalopathy, metabolic [G93.41] 02/27/2025 Yes    Nursing home resident [Z78.9] 02/27/2025 Yes    Cancer cachexia [R64] 02/27/2025 Yes    CAP (community acquired pneumonia) [J18.9] 02/26/2025 Yes    Septic shock [A41.9, R65.21] 02/26/2025 Yes    Cystitis [N30.90] 02/26/2025 Yes    NANCY (acute kidney injury) [N17.9] 02/26/2025 Yes    Pleural  effusion [J90] 2025 Yes    Open wound of left breast [S21.002A] 2025 Yes    Malignant neoplasm of overlapping sites of left female breast [C50.812] 2025 Yes    Hypercalcemia of malignancy [E83.52] 2025 Yes      Problems Resolved During this Admission:    Diagnosis Date Noted Date Resolved POA    Comfort measures only status [Z51.5] 2025 Not Applicable       Discharged Condition:     Disposition:     Follow Up:    Patient Instructions:   No discharge procedures on file.    Significant Diagnostic Studies: N/A    Pending Diagnostic Studies:       Procedure Component Value Units Date/Time    PTH-related peptide [[id number]] Collected: 25    Order Status: Sent Lab Status: In process Updated: 25 0953    Specimen: Blood            Medications:  None    Indwelling Lines/Drains at time of discharge:   Lines/Drains/Airways       Central Venous Catheter Line  Duration             Percutaneous Central Line - Triple Lumen  25 Internal Jugular Right 5 days                    Time spent on the discharge of patient: <20 minutes        Paddy Garzon MD  Department of Hospital Medicine  [facility]

## 2025-03-03 NOTE — HOSPITAL COURSE
Ms. Torres presented with septic shock with metabolic encephalopathy and hypercalcemia of malignancy. Initially managed with pressors, broad spectrum antibiotics, IVF, and calcitonin/zoledronic acid for hypercalcemia. Unfortunately, despite this management, she developed worsening shock. Discussions with family members and determined that best course would be to focus predominantly on comfort. Attempted to maintain pressor support as additional family members arriving to visit her. She  peacefully at 10:21pm on 25.

## 2025-03-04 LAB — PTH RELATED PROT SERPL-SCNC: 8.9 PMOL/L
